# Patient Record
Sex: FEMALE | Race: WHITE | NOT HISPANIC OR LATINO | Employment: OTHER | URBAN - METROPOLITAN AREA
[De-identification: names, ages, dates, MRNs, and addresses within clinical notes are randomized per-mention and may not be internally consistent; named-entity substitution may affect disease eponyms.]

---

## 2017-01-04 ENCOUNTER — AMBULATORY - HEALTHEAST (OUTPATIENT)
Dept: NURSING | Facility: CLINIC | Age: 67
End: 2017-01-04

## 2017-01-07 ENCOUNTER — OFFICE VISIT - HEALTHEAST (OUTPATIENT)
Dept: FAMILY MEDICINE | Facility: CLINIC | Age: 67
End: 2017-01-07

## 2017-01-07 DIAGNOSIS — J02.9 PHARYNGITIS: ICD-10-CM

## 2017-01-07 DIAGNOSIS — R07.0 THROAT PAIN: ICD-10-CM

## 2017-01-09 ENCOUNTER — COMMUNICATION - HEALTHEAST (OUTPATIENT)
Dept: FAMILY MEDICINE | Facility: CLINIC | Age: 67
End: 2017-01-09

## 2017-01-11 ENCOUNTER — COMMUNICATION - HEALTHEAST (OUTPATIENT)
Dept: INTERNAL MEDICINE | Facility: CLINIC | Age: 67
End: 2017-01-11

## 2017-01-11 ENCOUNTER — OFFICE VISIT - HEALTHEAST (OUTPATIENT)
Dept: INTERNAL MEDICINE | Facility: CLINIC | Age: 67
End: 2017-01-11

## 2017-01-11 DIAGNOSIS — R05.9 COUGH: ICD-10-CM

## 2017-02-14 ENCOUNTER — AMBULATORY - HEALTHEAST (OUTPATIENT)
Dept: NURSING | Facility: CLINIC | Age: 67
End: 2017-02-14

## 2017-02-14 ENCOUNTER — COMMUNICATION - HEALTHEAST (OUTPATIENT)
Dept: INTERNAL MEDICINE | Facility: CLINIC | Age: 67
End: 2017-02-14

## 2017-02-16 ENCOUNTER — HOSPITAL ENCOUNTER (OUTPATIENT)
Dept: MAMMOGRAPHY | Facility: HOSPITAL | Age: 67
Discharge: HOME | End: 2017-02-16
Attending: INTERNAL MEDICINE

## 2017-02-16 DIAGNOSIS — Z12.31 VISIT FOR SCREENING MAMMOGRAM: ICD-10-CM

## 2017-02-17 ENCOUNTER — RECORDS - HEALTHEAST (OUTPATIENT)
Dept: ADMINISTRATIVE | Facility: OTHER | Age: 67
End: 2017-02-17

## 2017-03-02 ENCOUNTER — RECORDS - HEALTHEAST (OUTPATIENT)
Dept: ADMINISTRATIVE | Facility: OTHER | Age: 67
End: 2017-03-02

## 2017-03-03 ENCOUNTER — HOSPITAL ENCOUNTER (OUTPATIENT)
Dept: PHYSICAL MEDICINE AND REHAB | Facility: CLINIC | Age: 67
Discharge: HOME | End: 2017-03-03
Attending: PHYSICAL MEDICINE & REHABILITATION

## 2017-03-03 DIAGNOSIS — M43.8X9 SAGITTAL PLANE IMBALANCE: ICD-10-CM

## 2017-03-03 DIAGNOSIS — M54.50 LUMBAR SPINE PAIN: ICD-10-CM

## 2017-03-03 DIAGNOSIS — M54.6 THORACIC SPINE PAIN: ICD-10-CM

## 2017-03-03 ASSESSMENT — MIFFLIN-ST. JEOR: SCORE: 1191.99

## 2017-03-07 ENCOUNTER — OFFICE VISIT - HEALTHEAST (OUTPATIENT)
Dept: PHYSICAL THERAPY | Facility: REHABILITATION | Age: 67
End: 2017-03-07

## 2017-03-07 DIAGNOSIS — M62.81 MUSCLE WEAKNESS (GENERALIZED): ICD-10-CM

## 2017-03-07 DIAGNOSIS — R29.3 ABNORMAL POSTURE: ICD-10-CM

## 2017-03-07 DIAGNOSIS — M54.6 CHRONIC BILATERAL THORACIC BACK PAIN: ICD-10-CM

## 2017-03-07 DIAGNOSIS — G89.29 CHRONIC BILATERAL THORACIC BACK PAIN: ICD-10-CM

## 2017-03-08 ENCOUNTER — HOSPITAL ENCOUNTER (OUTPATIENT)
Dept: RADIOLOGY | Facility: HOSPITAL | Age: 67
Discharge: HOME | End: 2017-03-08
Attending: PHYSICAL MEDICINE & REHABILITATION

## 2017-03-08 DIAGNOSIS — M43.8X9 SAGITTAL PLANE IMBALANCE: ICD-10-CM

## 2017-03-09 ENCOUNTER — COMMUNICATION - HEALTHEAST (OUTPATIENT)
Dept: PHYSICAL MEDICINE AND REHAB | Facility: CLINIC | Age: 67
End: 2017-03-09

## 2017-03-13 ENCOUNTER — OFFICE VISIT - HEALTHEAST (OUTPATIENT)
Dept: PHYSICAL THERAPY | Facility: REHABILITATION | Age: 67
End: 2017-03-13

## 2017-03-13 ENCOUNTER — AMBULATORY - HEALTHEAST (OUTPATIENT)
Dept: NURSING | Facility: CLINIC | Age: 67
End: 2017-03-13

## 2017-03-13 DIAGNOSIS — G89.29 CHRONIC BILATERAL THORACIC BACK PAIN: ICD-10-CM

## 2017-03-13 DIAGNOSIS — M62.81 MUSCLE WEAKNESS (GENERALIZED): ICD-10-CM

## 2017-03-13 DIAGNOSIS — R29.3 ABNORMAL POSTURE: ICD-10-CM

## 2017-03-13 DIAGNOSIS — M54.6 CHRONIC BILATERAL THORACIC BACK PAIN: ICD-10-CM

## 2017-03-17 ENCOUNTER — OFFICE VISIT - HEALTHEAST (OUTPATIENT)
Dept: PHYSICAL THERAPY | Facility: REHABILITATION | Age: 67
End: 2017-03-17

## 2017-03-17 ENCOUNTER — OFFICE VISIT - HEALTHEAST (OUTPATIENT)
Dept: INTERNAL MEDICINE | Facility: CLINIC | Age: 67
End: 2017-03-17

## 2017-03-17 DIAGNOSIS — M62.81 MUSCLE WEAKNESS (GENERALIZED): ICD-10-CM

## 2017-03-17 DIAGNOSIS — M81.0 OSTEOPOROSIS: ICD-10-CM

## 2017-03-17 DIAGNOSIS — R29.3 ABNORMAL POSTURE: ICD-10-CM

## 2017-03-17 DIAGNOSIS — G89.29 CHRONIC BILATERAL THORACIC BACK PAIN: ICD-10-CM

## 2017-03-17 DIAGNOSIS — M54.6 CHRONIC BILATERAL THORACIC BACK PAIN: ICD-10-CM

## 2017-03-21 ENCOUNTER — OFFICE VISIT - HEALTHEAST (OUTPATIENT)
Dept: PHYSICAL THERAPY | Facility: REHABILITATION | Age: 67
End: 2017-03-21

## 2017-03-21 DIAGNOSIS — G89.29 CHRONIC BILATERAL THORACIC BACK PAIN: ICD-10-CM

## 2017-03-21 DIAGNOSIS — R29.3 ABNORMAL POSTURE: ICD-10-CM

## 2017-03-21 DIAGNOSIS — M62.81 MUSCLE WEAKNESS (GENERALIZED): ICD-10-CM

## 2017-03-21 DIAGNOSIS — M54.6 CHRONIC BILATERAL THORACIC BACK PAIN: ICD-10-CM

## 2017-03-24 ENCOUNTER — OFFICE VISIT - HEALTHEAST (OUTPATIENT)
Dept: PHYSICAL THERAPY | Facility: REHABILITATION | Age: 67
End: 2017-03-24

## 2017-03-24 DIAGNOSIS — R29.3 ABNORMAL POSTURE: ICD-10-CM

## 2017-03-24 DIAGNOSIS — G89.29 CHRONIC BILATERAL THORACIC BACK PAIN: ICD-10-CM

## 2017-03-24 DIAGNOSIS — M62.81 MUSCLE WEAKNESS (GENERALIZED): ICD-10-CM

## 2017-03-24 DIAGNOSIS — M54.6 CHRONIC BILATERAL THORACIC BACK PAIN: ICD-10-CM

## 2017-03-28 ENCOUNTER — OFFICE VISIT - HEALTHEAST (OUTPATIENT)
Dept: PHYSICAL THERAPY | Facility: REHABILITATION | Age: 67
End: 2017-03-28

## 2017-03-28 DIAGNOSIS — R29.3 ABNORMAL POSTURE: ICD-10-CM

## 2017-03-28 DIAGNOSIS — G89.29 CHRONIC BILATERAL THORACIC BACK PAIN: ICD-10-CM

## 2017-03-28 DIAGNOSIS — M54.6 CHRONIC BILATERAL THORACIC BACK PAIN: ICD-10-CM

## 2017-03-28 DIAGNOSIS — M62.81 MUSCLE WEAKNESS (GENERALIZED): ICD-10-CM

## 2017-04-03 ENCOUNTER — OFFICE VISIT - HEALTHEAST (OUTPATIENT)
Dept: INTERNAL MEDICINE | Facility: CLINIC | Age: 67
End: 2017-04-03

## 2017-04-03 ENCOUNTER — OFFICE VISIT - HEALTHEAST (OUTPATIENT)
Dept: PHYSICAL THERAPY | Facility: REHABILITATION | Age: 67
End: 2017-04-03

## 2017-04-03 DIAGNOSIS — Z00.00 HEALTH CARE MAINTENANCE: ICD-10-CM

## 2017-04-03 DIAGNOSIS — G89.29 CHRONIC BILATERAL THORACIC BACK PAIN: ICD-10-CM

## 2017-04-03 DIAGNOSIS — R29.3 ABNORMAL POSTURE: ICD-10-CM

## 2017-04-03 DIAGNOSIS — Z78.0 MENOPAUSE: ICD-10-CM

## 2017-04-03 DIAGNOSIS — M62.81 MUSCLE WEAKNESS (GENERALIZED): ICD-10-CM

## 2017-04-03 DIAGNOSIS — E78.5 HYPERLIPEMIA: ICD-10-CM

## 2017-04-03 DIAGNOSIS — E53.8 B12 DEFICIENCY: ICD-10-CM

## 2017-04-03 DIAGNOSIS — M54.6 CHRONIC BILATERAL THORACIC BACK PAIN: ICD-10-CM

## 2017-04-03 LAB
CHOLEST SERPL-MCNC: 175 MG/DL
FASTING STATUS PATIENT QL REPORTED: YES
HDLC SERPL-MCNC: 53 MG/DL
LDLC SERPL CALC-MCNC: 87 MG/DL
TRIGL SERPL-MCNC: 173 MG/DL

## 2017-04-03 ASSESSMENT — MIFFLIN-ST. JEOR: SCORE: 1187.23

## 2017-04-04 ENCOUNTER — COMMUNICATION - HEALTHEAST (OUTPATIENT)
Dept: INTERNAL MEDICINE | Facility: CLINIC | Age: 67
End: 2017-04-04

## 2017-04-06 ENCOUNTER — HOSPITAL ENCOUNTER (OUTPATIENT)
Dept: PHYSICAL MEDICINE AND REHAB | Facility: CLINIC | Age: 67
Discharge: HOME | End: 2017-04-06
Attending: PHYSICAL MEDICINE & REHABILITATION

## 2017-04-06 DIAGNOSIS — M54.50 LUMBAR SPINE PAIN: ICD-10-CM

## 2017-04-06 DIAGNOSIS — M54.6 THORACIC SPINE PAIN: ICD-10-CM

## 2017-04-06 DIAGNOSIS — M43.8X9 SAGITTAL PLANE IMBALANCE: ICD-10-CM

## 2017-04-06 ASSESSMENT — MIFFLIN-ST. JEOR: SCORE: 1184.06

## 2017-04-18 ENCOUNTER — RECORDS - HEALTHEAST (OUTPATIENT)
Dept: BONE DENSITY | Facility: CLINIC | Age: 67
End: 2017-04-18

## 2017-04-18 ENCOUNTER — RECORDS - HEALTHEAST (OUTPATIENT)
Dept: ADMINISTRATIVE | Facility: OTHER | Age: 67
End: 2017-04-18

## 2017-04-18 DIAGNOSIS — M81.0 AGE-RELATED OSTEOPOROSIS WITHOUT CURRENT PATHOLOGICAL FRACTURE: ICD-10-CM

## 2017-05-03 ENCOUNTER — COMMUNICATION - HEALTHEAST (OUTPATIENT)
Dept: INTERNAL MEDICINE | Facility: CLINIC | Age: 67
End: 2017-05-03

## 2017-05-08 ENCOUNTER — OFFICE VISIT - HEALTHEAST (OUTPATIENT)
Dept: PHYSICAL THERAPY | Facility: REHABILITATION | Age: 67
End: 2017-05-08

## 2017-05-08 ENCOUNTER — AMBULATORY - HEALTHEAST (OUTPATIENT)
Dept: NURSING | Facility: CLINIC | Age: 67
End: 2017-05-08

## 2017-05-08 DIAGNOSIS — M62.81 MUSCLE WEAKNESS (GENERALIZED): ICD-10-CM

## 2017-05-08 DIAGNOSIS — R29.3 ABNORMAL POSTURE: ICD-10-CM

## 2017-05-08 DIAGNOSIS — M54.6 CHRONIC BILATERAL THORACIC BACK PAIN: ICD-10-CM

## 2017-05-08 DIAGNOSIS — G89.29 CHRONIC BILATERAL THORACIC BACK PAIN: ICD-10-CM

## 2017-06-07 ENCOUNTER — HOSPITAL ENCOUNTER (OUTPATIENT)
Dept: PHYSICAL MEDICINE AND REHAB | Facility: CLINIC | Age: 67
Discharge: HOME | End: 2017-06-07
Attending: PHYSICAL MEDICINE & REHABILITATION

## 2017-06-07 DIAGNOSIS — M79.18 MYOFASCIAL MUSCLE PAIN: ICD-10-CM

## 2017-06-07 DIAGNOSIS — M99.02 SOMATIC DYSFUNCTION OF THORACIC REGION: ICD-10-CM

## 2017-06-07 DIAGNOSIS — M54.2 CERVICALGIA: ICD-10-CM

## 2017-06-07 DIAGNOSIS — M99.01 SOMATIC DYSFUNCTION OF CERVICAL REGION: ICD-10-CM

## 2017-06-07 DIAGNOSIS — M99.08 SOMATIC DYSFUNCTION OF RIB REGION: ICD-10-CM

## 2017-06-07 DIAGNOSIS — M99.07 SOMATIC DYSFUNCTION OF UPPER EXTREMITY: ICD-10-CM

## 2017-06-07 DIAGNOSIS — M99.00 SOMATIC DYSFUNCTION OF HEAD REGION: ICD-10-CM

## 2017-06-07 ASSESSMENT — MIFFLIN-ST. JEOR: SCORE: 1184.06

## 2017-06-08 ENCOUNTER — AMBULATORY - HEALTHEAST (OUTPATIENT)
Dept: NURSING | Facility: CLINIC | Age: 67
End: 2017-06-08

## 2017-07-12 ENCOUNTER — RECORDS - HEALTHEAST (OUTPATIENT)
Dept: ADMINISTRATIVE | Facility: OTHER | Age: 67
End: 2017-07-12

## 2017-07-17 ENCOUNTER — OFFICE VISIT - HEALTHEAST (OUTPATIENT)
Dept: INTERNAL MEDICINE | Facility: CLINIC | Age: 67
End: 2017-07-17

## 2017-07-17 DIAGNOSIS — Z01.818 PREOP EXAM FOR INTERNAL MEDICINE: ICD-10-CM

## 2017-07-17 DIAGNOSIS — I45.10 RBBB: ICD-10-CM

## 2017-07-17 DIAGNOSIS — M40.00 KYPHOSIS (ACQUIRED) (POSTURAL): ICD-10-CM

## 2017-07-17 DIAGNOSIS — M18.12 PRIMARY OSTEOARTHRITIS OF FIRST CARPOMETACARPAL JOINT OF LEFT HAND: ICD-10-CM

## 2017-07-17 RX ORDER — CHLORAL HYDRATE 500 MG
2 CAPSULE ORAL 2 TIMES DAILY
Status: SHIPPED | COMMUNITY
Start: 2017-07-17 | End: 2021-12-28

## 2017-07-17 RX ORDER — MULTIPLE VITAMINS W/ MINERALS TAB 9MG-400MCG
1 TAB ORAL DAILY
Status: SHIPPED | COMMUNITY
Start: 2017-07-17

## 2017-08-01 ENCOUNTER — RECORDS - HEALTHEAST (OUTPATIENT)
Dept: ADMINISTRATIVE | Facility: OTHER | Age: 67
End: 2017-08-01

## 2017-08-10 ENCOUNTER — RECORDS - HEALTHEAST (OUTPATIENT)
Dept: ADMINISTRATIVE | Facility: OTHER | Age: 67
End: 2017-08-10

## 2017-08-18 ENCOUNTER — AMBULATORY - HEALTHEAST (OUTPATIENT)
Dept: NURSING | Facility: CLINIC | Age: 67
End: 2017-08-18

## 2017-08-30 ENCOUNTER — RECORDS - HEALTHEAST (OUTPATIENT)
Dept: ADMINISTRATIVE | Facility: OTHER | Age: 67
End: 2017-08-30

## 2017-09-15 ENCOUNTER — RECORDS - HEALTHEAST (OUTPATIENT)
Dept: ADMINISTRATIVE | Facility: OTHER | Age: 67
End: 2017-09-15

## 2017-09-22 ENCOUNTER — AMBULATORY - HEALTHEAST (OUTPATIENT)
Dept: NURSING | Facility: CLINIC | Age: 67
End: 2017-09-22

## 2017-09-22 DIAGNOSIS — Z00.00 HEALTHCARE MAINTENANCE: ICD-10-CM

## 2017-09-25 ENCOUNTER — RECORDS - HEALTHEAST (OUTPATIENT)
Dept: ADMINISTRATIVE | Facility: OTHER | Age: 67
End: 2017-09-25

## 2017-09-26 ENCOUNTER — COMMUNICATION - HEALTHEAST (OUTPATIENT)
Dept: INTERNAL MEDICINE | Facility: CLINIC | Age: 67
End: 2017-09-26

## 2017-10-17 ENCOUNTER — COMMUNICATION - HEALTHEAST (OUTPATIENT)
Dept: INTERNAL MEDICINE | Facility: CLINIC | Age: 67
End: 2017-10-17

## 2017-10-17 DIAGNOSIS — Z00.00 ROUTINE HEALTH MAINTENANCE: ICD-10-CM

## 2017-10-18 ENCOUNTER — RECORDS - HEALTHEAST (OUTPATIENT)
Dept: ADMINISTRATIVE | Facility: OTHER | Age: 67
End: 2017-10-18

## 2017-10-23 ENCOUNTER — AMBULATORY - HEALTHEAST (OUTPATIENT)
Dept: NURSING | Facility: CLINIC | Age: 67
End: 2017-10-23

## 2017-10-23 DIAGNOSIS — Z00.00 ROUTINE HEALTH MAINTENANCE: ICD-10-CM

## 2017-11-14 ENCOUNTER — AMBULATORY - HEALTHEAST (OUTPATIENT)
Dept: NURSING | Facility: CLINIC | Age: 67
End: 2017-11-14

## 2017-12-19 ENCOUNTER — AMBULATORY - HEALTHEAST (OUTPATIENT)
Dept: NURSING | Facility: CLINIC | Age: 67
End: 2017-12-19

## 2018-04-11 ENCOUNTER — COMMUNICATION - HEALTHEAST (OUTPATIENT)
Dept: INTERNAL MEDICINE | Facility: CLINIC | Age: 68
End: 2018-04-11

## 2018-04-11 DIAGNOSIS — Z78.0 MENOPAUSE: ICD-10-CM

## 2018-04-19 ENCOUNTER — OFFICE VISIT - HEALTHEAST (OUTPATIENT)
Dept: INTERNAL MEDICINE | Facility: CLINIC | Age: 68
End: 2018-04-19

## 2018-04-19 DIAGNOSIS — E78.5 HYPERLIPEMIA: ICD-10-CM

## 2018-04-19 DIAGNOSIS — E53.8 VITAMIN B12 DEFICIENCY: ICD-10-CM

## 2018-04-19 DIAGNOSIS — E55.9 VITAMIN D DEFICIENCY: ICD-10-CM

## 2018-04-19 LAB
ALBUMIN SERPL-MCNC: 3.5 G/DL (ref 3.5–5)
ALP SERPL-CCNC: 60 U/L (ref 45–120)
ALT SERPL W P-5'-P-CCNC: 29 U/L (ref 0–45)
ANION GAP SERPL CALCULATED.3IONS-SCNC: 9 MMOL/L (ref 5–18)
AST SERPL W P-5'-P-CCNC: 23 U/L (ref 0–40)
BILIRUB SERPL-MCNC: 0.5 MG/DL (ref 0–1)
BUN SERPL-MCNC: 15 MG/DL (ref 8–22)
CALCIUM SERPL-MCNC: 9.5 MG/DL (ref 8.5–10.5)
CHLORIDE BLD-SCNC: 105 MMOL/L (ref 98–107)
CHOLEST SERPL-MCNC: 191 MG/DL
CO2 SERPL-SCNC: 27 MMOL/L (ref 22–31)
CREAT SERPL-MCNC: 0.75 MG/DL (ref 0.6–1.1)
ERYTHROCYTE [DISTWIDTH] IN BLOOD BY AUTOMATED COUNT: 12 % (ref 11–14.5)
FASTING STATUS PATIENT QL REPORTED: YES
GFR SERPL CREATININE-BSD FRML MDRD: >60 ML/MIN/1.73M2
GLUCOSE BLD-MCNC: 91 MG/DL (ref 70–125)
HCT VFR BLD AUTO: 44.4 % (ref 35–47)
HDLC SERPL-MCNC: 52 MG/DL
HGB BLD-MCNC: 14.8 G/DL (ref 12–16)
LDLC SERPL CALC-MCNC: 105 MG/DL
MCH RBC QN AUTO: 29.3 PG (ref 27–34)
MCHC RBC AUTO-ENTMCNC: 33.3 G/DL (ref 32–36)
MCV RBC AUTO: 88 FL (ref 80–100)
PLATELET # BLD AUTO: 286 THOU/UL (ref 140–440)
PMV BLD AUTO: 8.1 FL (ref 7–10)
POTASSIUM BLD-SCNC: 4.6 MMOL/L (ref 3.5–5)
PROT SERPL-MCNC: 6.6 G/DL (ref 6–8)
RBC # BLD AUTO: 5.03 MILL/UL (ref 3.8–5.4)
SODIUM SERPL-SCNC: 141 MMOL/L (ref 136–145)
TRIGL SERPL-MCNC: 168 MG/DL
TSH SERPL DL<=0.005 MIU/L-ACNC: 1.38 UIU/ML (ref 0.3–5)
VIT B12 SERPL-MCNC: >2000 PG/ML (ref 213–816)
WBC: 6.7 THOU/UL (ref 4–11)

## 2018-04-19 ASSESSMENT — MIFFLIN-ST. JEOR: SCORE: 1180.6

## 2018-04-20 ENCOUNTER — COMMUNICATION - HEALTHEAST (OUTPATIENT)
Dept: INTERNAL MEDICINE | Facility: CLINIC | Age: 68
End: 2018-04-20

## 2018-04-20 LAB — 25(OH)D3 SERPL-MCNC: 46.7 NG/ML (ref 30–80)

## 2018-05-08 ENCOUNTER — HOSPITAL ENCOUNTER (OUTPATIENT)
Dept: MAMMOGRAPHY | Facility: CLINIC | Age: 68
Discharge: HOME | End: 2018-05-08
Attending: INTERNAL MEDICINE

## 2018-05-08 DIAGNOSIS — Z12.31 VISIT FOR SCREENING MAMMOGRAM: ICD-10-CM

## 2018-05-10 ENCOUNTER — COMMUNICATION - HEALTHEAST (OUTPATIENT)
Dept: INTERNAL MEDICINE | Facility: CLINIC | Age: 68
End: 2018-05-10

## 2018-05-10 DIAGNOSIS — Z78.0 MENOPAUSE: ICD-10-CM

## 2018-05-30 ENCOUNTER — RECORDS - HEALTHEAST (OUTPATIENT)
Dept: ADMINISTRATIVE | Facility: OTHER | Age: 68
End: 2018-05-30

## 2018-06-01 ENCOUNTER — RECORDS - HEALTHEAST (OUTPATIENT)
Dept: ADMINISTRATIVE | Facility: OTHER | Age: 68
End: 2018-06-01

## 2018-11-20 ENCOUNTER — COMMUNICATION - HEALTHEAST (OUTPATIENT)
Dept: INTERNAL MEDICINE | Facility: CLINIC | Age: 68
End: 2018-11-20

## 2018-11-20 DIAGNOSIS — Z78.0 MENOPAUSE: ICD-10-CM

## 2019-02-08 ENCOUNTER — RECORDS - HEALTHEAST (OUTPATIENT)
Dept: ADMINISTRATIVE | Facility: OTHER | Age: 69
End: 2019-02-08

## 2019-03-19 ENCOUNTER — COMMUNICATION - HEALTHEAST (OUTPATIENT)
Dept: INTERNAL MEDICINE | Facility: CLINIC | Age: 69
End: 2019-03-19

## 2019-03-19 DIAGNOSIS — Z78.0 MENOPAUSE: ICD-10-CM

## 2019-05-02 ENCOUNTER — OFFICE VISIT - HEALTHEAST (OUTPATIENT)
Dept: INTERNAL MEDICINE | Facility: CLINIC | Age: 69
End: 2019-05-02

## 2019-05-02 DIAGNOSIS — Z00.00 ENCOUNTER FOR MEDICARE ANNUAL WELLNESS EXAM: ICD-10-CM

## 2019-05-02 DIAGNOSIS — Z78.0 MENOPAUSE: ICD-10-CM

## 2019-05-02 DIAGNOSIS — M85.89 OSTEOPENIA OF MULTIPLE SITES: ICD-10-CM

## 2019-05-02 DIAGNOSIS — H90.3 SENSORINEURAL HEARING LOSS, BILATERAL: ICD-10-CM

## 2019-05-02 LAB
ALBUMIN SERPL-MCNC: 3.6 G/DL (ref 3.5–5)
ALP SERPL-CCNC: 60 U/L (ref 45–120)
ALT SERPL W P-5'-P-CCNC: 29 U/L (ref 0–45)
ANION GAP SERPL CALCULATED.3IONS-SCNC: 13 MMOL/L (ref 5–18)
AST SERPL W P-5'-P-CCNC: 23 U/L (ref 0–40)
BASOPHILS # BLD AUTO: 0.1 THOU/UL (ref 0–0.2)
BASOPHILS NFR BLD AUTO: 1 % (ref 0–2)
BILIRUB SERPL-MCNC: 0.5 MG/DL (ref 0–1)
BUN SERPL-MCNC: 15 MG/DL (ref 8–22)
CALCIUM SERPL-MCNC: 10 MG/DL (ref 8.5–10.5)
CHLORIDE BLD-SCNC: 106 MMOL/L (ref 98–107)
CO2 SERPL-SCNC: 23 MMOL/L (ref 22–31)
CREAT SERPL-MCNC: 0.77 MG/DL (ref 0.6–1.1)
EOSINOPHIL # BLD AUTO: 0.3 THOU/UL (ref 0–0.4)
EOSINOPHIL NFR BLD AUTO: 3 % (ref 0–6)
ERYTHROCYTE [DISTWIDTH] IN BLOOD BY AUTOMATED COUNT: 11.8 % (ref 11–14.5)
GFR SERPL CREATININE-BSD FRML MDRD: >60 ML/MIN/1.73M2
GLUCOSE BLD-MCNC: 82 MG/DL (ref 70–125)
HCT VFR BLD AUTO: 40.5 % (ref 35–47)
HGB BLD-MCNC: 13.5 G/DL (ref 12–16)
LYMPHOCYTES # BLD AUTO: 1.1 THOU/UL (ref 0.8–4.4)
LYMPHOCYTES NFR BLD AUTO: 9 % (ref 20–40)
MCH RBC QN AUTO: 29.7 PG (ref 27–34)
MCHC RBC AUTO-ENTMCNC: 33.3 G/DL (ref 32–36)
MCV RBC AUTO: 89 FL (ref 80–100)
MONOCYTES # BLD AUTO: 1 THOU/UL (ref 0–0.9)
MONOCYTES NFR BLD AUTO: 8 % (ref 2–10)
NEUTROPHILS # BLD AUTO: 9.9 THOU/UL (ref 2–7.7)
NEUTROPHILS NFR BLD AUTO: 80 % (ref 50–70)
PLATELET # BLD AUTO: 264 THOU/UL (ref 140–440)
PMV BLD AUTO: 8.3 FL (ref 7–10)
POTASSIUM BLD-SCNC: 4.3 MMOL/L (ref 3.5–5)
PROT SERPL-MCNC: 6.5 G/DL (ref 6–8)
RBC # BLD AUTO: 4.54 MILL/UL (ref 3.8–5.4)
SODIUM SERPL-SCNC: 142 MMOL/L (ref 136–145)
WBC: 12.3 THOU/UL (ref 4–11)

## 2019-05-02 ASSESSMENT — MIFFLIN-ST. JEOR: SCORE: 1206.5

## 2019-05-03 ENCOUNTER — COMMUNICATION - HEALTHEAST (OUTPATIENT)
Dept: INTERNAL MEDICINE | Facility: CLINIC | Age: 69
End: 2019-05-03

## 2019-05-06 ENCOUNTER — COMMUNICATION - HEALTHEAST (OUTPATIENT)
Dept: INTERNAL MEDICINE | Facility: CLINIC | Age: 69
End: 2019-05-06

## 2019-05-07 ENCOUNTER — COMMUNICATION - HEALTHEAST (OUTPATIENT)
Dept: INTERNAL MEDICINE | Facility: CLINIC | Age: 69
End: 2019-05-07

## 2019-05-10 ENCOUNTER — COMMUNICATION - HEALTHEAST (OUTPATIENT)
Dept: INTERNAL MEDICINE | Facility: CLINIC | Age: 69
End: 2019-05-10

## 2019-05-13 ENCOUNTER — RECORDS - HEALTHEAST (OUTPATIENT)
Dept: BONE DENSITY | Facility: CLINIC | Age: 69
End: 2019-05-13

## 2019-05-13 ENCOUNTER — RECORDS - HEALTHEAST (OUTPATIENT)
Dept: ADMINISTRATIVE | Facility: OTHER | Age: 69
End: 2019-05-13

## 2019-05-13 DIAGNOSIS — Z78.0 ASYMPTOMATIC MENOPAUSAL STATE: ICD-10-CM

## 2019-05-14 ENCOUNTER — RECORDS - HEALTHEAST (OUTPATIENT)
Dept: MAMMOGRAPHY | Facility: CLINIC | Age: 69
End: 2019-05-14

## 2019-05-14 DIAGNOSIS — Z12.31 ENCOUNTER FOR SCREENING MAMMOGRAM FOR MALIGNANT NEOPLASM OF BREAST: ICD-10-CM

## 2019-05-17 ENCOUNTER — COMMUNICATION - HEALTHEAST (OUTPATIENT)
Dept: INTERNAL MEDICINE | Facility: CLINIC | Age: 69
End: 2019-05-17

## 2019-05-19 ENCOUNTER — COMMUNICATION - HEALTHEAST (OUTPATIENT)
Dept: INTERNAL MEDICINE | Facility: CLINIC | Age: 69
End: 2019-05-19

## 2019-05-20 ENCOUNTER — COMMUNICATION - HEALTHEAST (OUTPATIENT)
Dept: INTERNAL MEDICINE | Facility: CLINIC | Age: 69
End: 2019-05-20

## 2019-06-27 ENCOUNTER — COMMUNICATION - HEALTHEAST (OUTPATIENT)
Dept: INTERNAL MEDICINE | Facility: CLINIC | Age: 69
End: 2019-06-27

## 2019-06-27 DIAGNOSIS — E78.5 HYPERLIPIDEMIA LDL GOAL <100: ICD-10-CM

## 2019-07-03 ENCOUNTER — COMMUNICATION - HEALTHEAST (OUTPATIENT)
Dept: INTERNAL MEDICINE | Facility: CLINIC | Age: 69
End: 2019-07-03

## 2019-07-03 ENCOUNTER — AMBULATORY - HEALTHEAST (OUTPATIENT)
Dept: LAB | Facility: CLINIC | Age: 69
End: 2019-07-03

## 2019-07-03 DIAGNOSIS — E78.5 HYPERLIPIDEMIA LDL GOAL <100: ICD-10-CM

## 2019-07-03 LAB
BASOPHILS # BLD AUTO: 0.1 THOU/UL (ref 0–0.2)
BASOPHILS NFR BLD AUTO: 1 % (ref 0–2)
CHOLEST SERPL-MCNC: 160 MG/DL
EOSINOPHIL # BLD AUTO: 0.3 THOU/UL (ref 0–0.4)
EOSINOPHIL NFR BLD AUTO: 3 % (ref 0–6)
ERYTHROCYTE [DISTWIDTH] IN BLOOD BY AUTOMATED COUNT: 11.5 % (ref 11–14.5)
FASTING STATUS PATIENT QL REPORTED: YES
HCT VFR BLD AUTO: 42.4 % (ref 35–47)
HDLC SERPL-MCNC: 55 MG/DL
HGB BLD-MCNC: 14.2 G/DL (ref 12–16)
LDLC SERPL CALC-MCNC: 78 MG/DL
LYMPHOCYTES # BLD AUTO: 1.9 THOU/UL (ref 0.8–4.4)
LYMPHOCYTES NFR BLD AUTO: 22 % (ref 20–40)
MCH RBC QN AUTO: 29.8 PG (ref 27–34)
MCHC RBC AUTO-ENTMCNC: 33.5 G/DL (ref 32–36)
MCV RBC AUTO: 89 FL (ref 80–100)
MONOCYTES # BLD AUTO: 0.7 THOU/UL (ref 0–0.9)
MONOCYTES NFR BLD AUTO: 8 % (ref 2–10)
NEUTROPHILS # BLD AUTO: 5.6 THOU/UL (ref 2–7.7)
NEUTROPHILS NFR BLD AUTO: 66 % (ref 50–70)
PLATELET # BLD AUTO: 355 THOU/UL (ref 140–440)
PMV BLD AUTO: 8.1 FL (ref 7–10)
RBC # BLD AUTO: 4.77 MILL/UL (ref 3.8–5.4)
TRIGL SERPL-MCNC: 137 MG/DL
WBC: 8.5 THOU/UL (ref 4–11)

## 2019-08-02 ENCOUNTER — RECORDS - HEALTHEAST (OUTPATIENT)
Dept: ADMINISTRATIVE | Facility: OTHER | Age: 69
End: 2019-08-02

## 2019-08-02 LAB
LAB AP CHARGES (HE HISTORICAL CONVERSION): NORMAL
PATH REPORT.COMMENTS IMP SPEC: NORMAL
PATH REPORT.COMMENTS IMP SPEC: NORMAL
PATH REPORT.FINAL DX SPEC: NORMAL
PATH REPORT.GROSS SPEC: NORMAL
PATH REPORT.MICROSCOPIC SPEC OTHER STN: NORMAL
PATH REPORT.RELEVANT HX SPEC: NORMAL
RESULT FLAG (HE HISTORICAL CONVERSION): NORMAL

## 2020-01-31 ENCOUNTER — COMMUNICATION - HEALTHEAST (OUTPATIENT)
Dept: INTERNAL MEDICINE | Facility: CLINIC | Age: 70
End: 2020-01-31

## 2020-01-31 DIAGNOSIS — Z78.0 MENOPAUSE: ICD-10-CM

## 2020-05-23 ENCOUNTER — COMMUNICATION - HEALTHEAST (OUTPATIENT)
Dept: INTERNAL MEDICINE | Facility: CLINIC | Age: 70
End: 2020-05-23

## 2020-05-23 DIAGNOSIS — Z12.31 ENCOUNTER FOR SCREENING MAMMOGRAM FOR BREAST CANCER: ICD-10-CM

## 2020-05-25 ENCOUNTER — COMMUNICATION - HEALTHEAST (OUTPATIENT)
Dept: INTERNAL MEDICINE | Facility: CLINIC | Age: 70
End: 2020-05-25

## 2020-05-25 DIAGNOSIS — Z78.0 MENOPAUSE: ICD-10-CM

## 2020-05-26 ENCOUNTER — COMMUNICATION - HEALTHEAST (OUTPATIENT)
Dept: INTERNAL MEDICINE | Facility: CLINIC | Age: 70
End: 2020-05-26

## 2020-05-26 DIAGNOSIS — Z78.0 MENOPAUSE: ICD-10-CM

## 2020-05-27 RX ORDER — ESTRADIOL 0.5 MG/1
TABLET ORAL
Qty: 90 TABLET | Refills: 3 | Status: SHIPPED | OUTPATIENT
Start: 2020-05-27 | End: 2021-12-22

## 2020-06-23 ENCOUNTER — HOSPITAL ENCOUNTER (OUTPATIENT)
Dept: MAMMOGRAPHY | Facility: CLINIC | Age: 70
Discharge: HOME | End: 2020-06-23
Attending: INTERNAL MEDICINE

## 2020-06-23 DIAGNOSIS — Z12.31 ENCOUNTER FOR SCREENING MAMMOGRAM FOR BREAST CANCER: ICD-10-CM

## 2020-07-27 ENCOUNTER — OFFICE VISIT - HEALTHEAST (OUTPATIENT)
Dept: INTERNAL MEDICINE | Facility: CLINIC | Age: 70
End: 2020-07-27

## 2020-07-27 DIAGNOSIS — G60.9 HEREDITARY AND IDIOPATHIC PERIPHERAL NEUROPATHY: ICD-10-CM

## 2020-07-27 DIAGNOSIS — Z79.890 HORMONE REPLACEMENT THERAPY: ICD-10-CM

## 2020-07-27 DIAGNOSIS — K63.5 BENIGN POLYP OF LARGE INTESTINE: ICD-10-CM

## 2020-07-27 DIAGNOSIS — E53.8 VITAMIN B12 DEFICIENCY (NON ANEMIC): ICD-10-CM

## 2020-07-27 DIAGNOSIS — Z23 ENCOUNTER FOR IMMUNIZATION: ICD-10-CM

## 2020-07-27 DIAGNOSIS — Z13.220 SCREENING FOR HYPERLIPIDEMIA: ICD-10-CM

## 2020-07-27 DIAGNOSIS — R63.4 WEIGHT LOSS: ICD-10-CM

## 2020-07-27 DIAGNOSIS — R15.9 INCONTINENCE OF FECES, UNSPECIFIED FECAL INCONTINENCE TYPE: ICD-10-CM

## 2020-07-27 DIAGNOSIS — Z00.00 INITIAL MEDICARE ANNUAL WELLNESS VISIT: ICD-10-CM

## 2020-07-27 DIAGNOSIS — E73.9 LACTOSE INTOLERANCE: ICD-10-CM

## 2020-07-27 LAB
ALBUMIN SERPL-MCNC: 3.8 G/DL (ref 3.5–5)
ALP SERPL-CCNC: 62 U/L (ref 45–120)
ALT SERPL W P-5'-P-CCNC: 36 U/L (ref 0–45)
ANION GAP SERPL CALCULATED.3IONS-SCNC: 8 MMOL/L (ref 5–18)
AST SERPL W P-5'-P-CCNC: 30 U/L (ref 0–40)
BASOPHILS # BLD AUTO: 0.1 THOU/UL (ref 0–0.2)
BASOPHILS NFR BLD AUTO: 1 % (ref 0–2)
BILIRUB SERPL-MCNC: 0.4 MG/DL (ref 0–1)
BUN SERPL-MCNC: 14 MG/DL (ref 8–22)
CALCIUM SERPL-MCNC: 9.9 MG/DL (ref 8.5–10.5)
CHLORIDE BLD-SCNC: 104 MMOL/L (ref 98–107)
CHOLEST SERPL-MCNC: 184 MG/DL
CO2 SERPL-SCNC: 29 MMOL/L (ref 22–31)
CREAT SERPL-MCNC: 0.84 MG/DL (ref 0.6–1.1)
EOSINOPHIL # BLD AUTO: 0.2 THOU/UL (ref 0–0.4)
EOSINOPHIL NFR BLD AUTO: 2 % (ref 0–6)
ERYTHROCYTE [DISTWIDTH] IN BLOOD BY AUTOMATED COUNT: 12.1 % (ref 11–14.5)
FASTING STATUS PATIENT QL REPORTED: YES
GFR SERPL CREATININE-BSD FRML MDRD: >60 ML/MIN/1.73M2
GLUCOSE BLD-MCNC: 98 MG/DL (ref 70–125)
HCT VFR BLD AUTO: 44 % (ref 35–47)
HDLC SERPL-MCNC: 51 MG/DL
HGB BLD-MCNC: 14.6 G/DL (ref 12–16)
LDLC SERPL CALC-MCNC: 106 MG/DL
LYMPHOCYTES # BLD AUTO: 1.4 THOU/UL (ref 0.8–4.4)
LYMPHOCYTES NFR BLD AUTO: 17 % (ref 20–40)
MCH RBC QN AUTO: 30 PG (ref 27–34)
MCHC RBC AUTO-ENTMCNC: 33.2 G/DL (ref 32–36)
MCV RBC AUTO: 90 FL (ref 80–100)
MONOCYTES # BLD AUTO: 0.6 THOU/UL (ref 0–0.9)
MONOCYTES NFR BLD AUTO: 8 % (ref 2–10)
NEUTROPHILS # BLD AUTO: 5.7 THOU/UL (ref 2–7.7)
NEUTROPHILS NFR BLD AUTO: 72 % (ref 50–70)
PLATELET # BLD AUTO: 305 THOU/UL (ref 140–440)
PMV BLD AUTO: 8.1 FL (ref 7–10)
POTASSIUM BLD-SCNC: 4.8 MMOL/L (ref 3.5–5)
PROT SERPL-MCNC: 6.9 G/DL (ref 6–8)
RBC # BLD AUTO: 4.87 MILL/UL (ref 3.8–5.4)
SODIUM SERPL-SCNC: 141 MMOL/L (ref 136–145)
T4 FREE SERPL-MCNC: 0.9 NG/DL (ref 0.7–1.8)
TRIGL SERPL-MCNC: 135 MG/DL
TSH SERPL DL<=0.005 MIU/L-ACNC: 1.99 UIU/ML (ref 0.3–5)
VIT B12 SERPL-MCNC: 932 PG/ML (ref 213–816)
WBC: 7.9 THOU/UL (ref 4–11)

## 2020-07-27 ASSESSMENT — MIFFLIN-ST. JEOR: SCORE: 1134.16

## 2020-07-28 ENCOUNTER — COMMUNICATION - HEALTHEAST (OUTPATIENT)
Dept: INTERNAL MEDICINE | Facility: CLINIC | Age: 70
End: 2020-07-28

## 2020-11-27 ENCOUNTER — COMMUNICATION - HEALTHEAST (OUTPATIENT)
Dept: INTERNAL MEDICINE | Facility: CLINIC | Age: 70
End: 2020-11-27

## 2021-02-16 ENCOUNTER — COMMUNICATION - HEALTHEAST (OUTPATIENT)
Dept: INTERNAL MEDICINE | Facility: CLINIC | Age: 71
End: 2021-02-16

## 2021-04-07 ENCOUNTER — OFFICE VISIT - HEALTHEAST (OUTPATIENT)
Dept: OTOLARYNGOLOGY | Facility: CLINIC | Age: 71
End: 2021-04-07

## 2021-04-07 DIAGNOSIS — J35.8 TONSIL STONE: ICD-10-CM

## 2021-04-07 DIAGNOSIS — J37.0 CHRONIC LARYNGITIS: ICD-10-CM

## 2021-04-07 DIAGNOSIS — K21.9 LARYNGOPHARYNGEAL REFLUX (LPR): ICD-10-CM

## 2021-04-16 ENCOUNTER — COMMUNICATION - HEALTHEAST (OUTPATIENT)
Dept: ADMINISTRATIVE | Facility: CLINIC | Age: 71
End: 2021-04-16

## 2021-04-16 DIAGNOSIS — M85.89 OSTEOPENIA OF MULTIPLE SITES: ICD-10-CM

## 2021-05-24 ENCOUNTER — RECORDS - HEALTHEAST (OUTPATIENT)
Dept: ADMINISTRATIVE | Facility: OTHER | Age: 71
End: 2021-05-24

## 2021-05-24 ENCOUNTER — RECORDS - HEALTHEAST (OUTPATIENT)
Dept: BONE DENSITY | Facility: CLINIC | Age: 71
End: 2021-05-24

## 2021-05-24 DIAGNOSIS — M85.89 OTHER SPECIFIED DISORDERS OF BONE DENSITY AND STRUCTURE, MULTIPLE SITES: ICD-10-CM

## 2021-05-25 ENCOUNTER — RECORDS - HEALTHEAST (OUTPATIENT)
Dept: ADMINISTRATIVE | Facility: CLINIC | Age: 71
End: 2021-05-25

## 2021-05-26 ENCOUNTER — RECORDS - HEALTHEAST (OUTPATIENT)
Dept: ADMINISTRATIVE | Facility: CLINIC | Age: 71
End: 2021-05-26

## 2021-05-27 ENCOUNTER — RECORDS - HEALTHEAST (OUTPATIENT)
Dept: ADMINISTRATIVE | Facility: CLINIC | Age: 71
End: 2021-05-27

## 2021-05-28 ENCOUNTER — RECORDS - HEALTHEAST (OUTPATIENT)
Dept: ADMINISTRATIVE | Facility: CLINIC | Age: 71
End: 2021-05-28

## 2021-05-28 NOTE — PROGRESS NOTES
Assessment and Plan:     1. Osteopenia   She takes vitamin D and calcium.  Due for follow up DEXA  - DXA Bone Density Scan; Future     2. Sensorineural hearing loss  Recent audiogram is on file.  She has mild to moderate SNHL.  We discussed the evidence of long term brain benefit from hearing aids.  She doesn't have to rush to get aids if doing OK with her living activities now.     3. Encounter for Medicare annual wellness exam  Her exam and history today reveal no acute iissues. We discussed healthy life style and diet.   - Comprehensive Metabolic Panel  - HM1(CBC and Differential)    The patient's current medical problems were reviewed.    I have had an Advance Directives discussion with the patient.  The following health maintenance schedule was reviewed with the patient and provided in printed form in the after visit summary:   Health Maintenance   Topic Date Due     ZOSTER VACCINES (2 of 3) 11/22/2012     PNEUMOCOCCAL POLYSACCHARIDE VACCINE AGE 65 AND OVER  07/20/2017     DXA SCAN  04/18/2019     FALL RISK ASSESSMENT  04/19/2019     TD 18+ HE  02/12/2020     MAMMOGRAM  05/08/2020     ADVANCE DIRECTIVES DISCUSSED WITH PATIENT  04/19/2023     COLONOSCOPY  05/30/2028     INFLUENZA VACCINE RULE BASED  Completed     PNEUMOCOCCAL CONJUGATE VACCINE FOR ADULTS (PCV13 OR PREVNAR)  Completed        Subjective:   Chief Complaint: Raj Kim is an 68 y.o. female here for an Annual Wellness visit.     HPI:  She feels OK, no new problems.  No unusual dyspnea, or chest or abdominal pain.  No changes in bowel or bladder symptoms.        Review of Systems:  Please see above.  The rest of the review of systems are negative for all systems.    Patient Care Team:  Lorenzo Dunn MD as PCP - General     Patient Active Problem List   Diagnosis     Osteoarthritis Of Multiple Sites     Vitamin B12 Deficiency     Hyperlipidemia     Peripheral Neuropathy     Papillary Carcinoma Of The Ovary     Radiation Therapy     Varicose  veins     Acquired lymphedema of leg     Personal history of ovarian cancer     Venous stasis     Lactose intolerance     RBBB     Benign polyp of large intestine     Lyme disease     Kyphosis (acquired) (postural)     Osteopenia of multiple sites     Sensorineural hearing loss, bilateral     Past Medical History:   Diagnosis Date     Osteopenia of multiple sites 2019     Ovarian cancer (H)      Peripheral neuropathy, secondary to drugs or chemicals      Radiation colitis      Sensorineural hearing loss, bilateral 2019     Vitamin B 12 deficiency       Past Surgical History:   Procedure Laterality Date     EXPLORATORY LAPAROTOMY      ovary/omentum abnormalities     FINGER SURGERY Right 2016    Dr. 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/   Social Needs     Financial resource strain: Not on file     Food insecurity:     Worry: Not on file     Inability: Not on file     Transportation needs:     Medical: Not on file     Non-medical: Not on file   Tobacco Use     Smoking status: Former Smoker     Smokeless tobacco: Never Used   Substance and Sexual Activity     Alcohol use: Yes     Comment: occasional     Drug use: No     Sexual activity: Not Currently   Lifestyle     Physical activity:     Days per week: Not on file     Minutes per session: Not on file     Stress: Not on file   Relationships     Social connections:     Talks on phone: Not on file     Gets together: Not on file     Attends [employer]  service: Not on file     Active member of club or organization: Not on file     Attends meetings of clubs or organizations: Not on file     Relationship status: Not on file     Intimate partner violence:     Fear of current or ex partner: Not on file     Emotionally abused: Not on file     Physically abused: Not on file     Forced sexual activity: Not on file   Other Topics Concern     Not on file   Social History Narrative    . One adopted son Surendra- lives in [place] (age 35 in 2018). 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     avid bird watcher/hobbyist...(nick Whitley/isaiah Gutierrez); sister-Oneida Roberts.-primary contact      Current Outpatient Medications   Medication Sig Dispense Refill     betamethasone dipropionate (DIPROLENE) 0.05 % ointment Apply topically as needed.        calcium citrate-vitamin D3 (CITRACAL + D) 315-250 mg-unit per tablet Take 1 tablet by mouth daily.       cholecalciferol, vitamin D3, (VITAMIN D3) 2,000 unit capsule Take 5,000 Units by mouth daily.              cyanocobalamin, vitamin B-12, 1,000 mcg Subl Place 1,000 mcg under the tongue daily.       desonide (DESOWEN) 0.05 % cream Apply topically as needed.        estradiol (ESTRACE) 0.5 MG tablet Take 1 tablet (0.5 mg total) by mouth daily. 90 tablet 3     FLAXSEED OIL ORAL Take by mouth.       gabapentin (NEURONTIN) 100 MG capsule Take 200 mg by mouth 2 times a day at 6:00 am and 4:00 pm.        Lactobacillus rhamnosus GG (CULTURELLE) 10-15 Billion cell capsule Take 1 capsule by mouth daily.       multivitamin with minerals (THERA-M) 9 mg iron-400 mcg Tab tablet Take 1 tablet by mouth daily.       OMEGA-3/DHA/EPA/FISH OIL (FISH OIL-OMEGA-3 FATTY ACIDS) 300-1,000 mg capsule Take 2 g by mouth 2 (two) times a day.       psyllium with dextrose (METAMUCIL JAR) powder Take by mouth 3 (three) times a day. 1 tsp daily       RED YEAST RICE ORAL Take by mouth.       valACYclovir (VALTREX) 500 MG tablet Take 500 mg by mouth as  "needed.        No current facility-administered medications for this visit.       Objective:   Vital Signs:   Visit Vitals  /64 (Patient Site: Left Arm, Patient Position: Sitting, Cuff Size: Adult Regular)   Pulse 75   Ht 5' 5.5\" (1.664 m)   Wt 147 lb 3.2 oz (66.8 kg)   LMP 02/04/1980   SpO2 94%   BMI 24.12 kg/m       PHYSICAL EXAM  General:  In NAD, alert and oriented  HEENT: no congestion or abnormality  Neck:  No adenopathy or thyroid enlargement  Chest:  Clear to auscultation, no wheeze or rhonchi  Heart: S1 S2 without murmur  Abdomen:  Soft, flat, and non-tender, no mass or guarding, or rebound.  Extremities:  Chronic edema with varicosities, no ulcer or inflammation  Neuro;  Speech and gait are normal, no motor weakness  Psych:  Thinking is clear, mood is normal, behavior normal.     Assessment Results 5/2/2019   Activities of Daily Living No help needed   Instrumental Activities of Daily Living No help needed   Get Up and Go Score Less than 12 seconds   Mini Cog Total Score 5   Some recent data might be hidden     A Mini-Cog score of 0-2 suggests the possibility of dementia, score of 3-5 suggests no dementia    Identified Health Risks:     Patient's advanced directive was discussed and I am comfortable with the patient's wishes.        "

## 2021-05-30 VITALS — BODY MASS INDEX: 23.78 KG/M2 | WEIGHT: 145.1 LBS

## 2021-05-30 VITALS — WEIGHT: 144 LBS | HEIGHT: 66 IN | BODY MASS INDEX: 23.14 KG/M2

## 2021-05-30 VITALS — BODY MASS INDEX: 23.99 KG/M2 | HEIGHT: 65 IN | WEIGHT: 144 LBS

## 2021-05-30 VITALS — BODY MASS INDEX: 24.11 KG/M2 | WEIGHT: 144.7 LBS | HEIGHT: 65 IN

## 2021-05-30 VITALS — WEIGHT: 145.9 LBS | BODY MASS INDEX: 23.91 KG/M2

## 2021-05-30 VITALS — WEIGHT: 144 LBS | BODY MASS INDEX: 23.99 KG/M2 | HEIGHT: 65 IN

## 2021-05-31 ENCOUNTER — RECORDS - HEALTHEAST (OUTPATIENT)
Dept: ADMINISTRATIVE | Facility: CLINIC | Age: 71
End: 2021-05-31

## 2021-05-31 VITALS — WEIGHT: 140.4 LBS | BODY MASS INDEX: 23.36 KG/M2

## 2021-06-01 VITALS — HEIGHT: 65 IN | BODY MASS INDEX: 23.66 KG/M2 | WEIGHT: 142 LBS

## 2021-06-02 VITALS — HEIGHT: 66 IN | BODY MASS INDEX: 23.66 KG/M2 | WEIGHT: 147.2 LBS

## 2021-06-03 ENCOUNTER — RECORDS - HEALTHEAST (OUTPATIENT)
Dept: ADMINISTRATIVE | Facility: CLINIC | Age: 71
End: 2021-06-03

## 2021-06-04 VITALS
WEIGHT: 133 LBS | HEIGHT: 65 IN | OXYGEN SATURATION: 98 % | HEART RATE: 82 BPM | SYSTOLIC BLOOD PRESSURE: 122 MMHG | BODY MASS INDEX: 22.16 KG/M2 | DIASTOLIC BLOOD PRESSURE: 68 MMHG

## 2021-06-05 NOTE — TELEPHONE ENCOUNTER
Refill Approved    Rx renewed per Medication Renewal Policy. Medication was last renewed on 3/20/19.    Lisbeth Melara, [facility] [place] Connection Triage/Med Refill 2/1/2020     Requested Prescriptions   Pending Prescriptions Disp Refills     estradiol (ESTRACE) 0.5 MG tablet [Pharmacy Med Name: ESTRADIOL 0.5MG TABLETS] 90 tablet 3     Sig: TAKE 1 TABLET BY MOUTH DAILY       Hormone Replacement Therapy Refill Protocol Passed - 1/31/2020  7:49 AM        Passed - PCP or prescribing provider visit in past 12 months       Last office visit with prescriber/PCP: Visit date not found OR same dept: Visit date not found OR same specialty: 3/17/2017 Shahida Benites MD  Last physical: 5/2/2019 Last MTM visit: Visit date not found     Next visit within 3 mo: Visit date not found  Next physical within 3 mo: Visit date not found  Prescriber OR PCP: Lorenzo Dunn MD  Last diagnosis associated with med order: 1. Menopause  - estradiol (ESTRACE) 0.5 MG tablet [Pharmacy Med Name: ESTRADIOL 0.5MG TABLETS]; TAKE 1 TABLET BY MOUTH DAILY  Dispense: 90 tablet; Refill: 3     If protocol passes may refill for 3 months.

## 2021-06-08 NOTE — TELEPHONE ENCOUNTER
RN cannot approve Refill Request    RN can NOT refill this medication Protocol failed and NO refill given.       Lisbeth Melara, [facility] Triage/Med Refill 5/27/2020    Requested Prescriptions   Pending Prescriptions Disp Refills     estradioL (ESTRACE) 0.5 MG tablet [Pharmacy Med Name: ESTRADIOL 0.5MG TABLETS] 90 tablet 3     Sig: TAKE 1 TABLET BY MOUTH DAILY.       Hormone Replacement Therapy Refill Protocol Failed - 5/25/2020  7:43 PM        Failed - PCP or prescribing provider visit in past 12 months       Last office visit with prescriber/PCP: Visit date not found OR same dept: Visit date not found OR same specialty: 3/17/2017 Shahida Benites MD  Last physical: 5/2/2019 Last MTM visit: Visit date not found     Next visit within 3 mo: Visit date not found  Next physical within 3 mo: Visit date not found  Prescriber OR PCP: Lorenzo Dunn MD  Last diagnosis associated with med order: 1. Menopause  - estradioL (ESTRACE) 0.5 MG tablet [Pharmacy Med Name: ESTRADIOL 0.5MG TABLETS]; TAKE 1 TABLET BY MOUTH DAILY  Dispense: 90 tablet; Refill: 0     If protocol passes may refill for 3 months.

## 2021-06-08 NOTE — PROGRESS NOTES
[facility] Follow Up Note    Assessment/Plan:  1. Cough  Present approximately 10 days.  Symptoms began initially with sore throat and progressed to a dry nonproductive cough.  No fever, chills, myalgias or gastrointestinal symptoms.  She is traveling to [place] on Friday.  Recommendation: Will prescribe guaifenesin with codeine for irritating cough.  Have given her prescription for Z-Munir to take in the event that her symptoms do not turn around within the acceptable period of time i.e. over the next 3-4 days.  Have educated patient that we don't want to treat viruses with antibiotics.  She is definitely in agreement.        Yahaira Goldstein MD    Chief Complaint:  Chief Complaint   Patient presents with     Cough       History of Present Illness:  Raj is a 66 y.o. female who is here today for evaluation of a cough.  She states she is been feeling not quite herself for the past 10 days.  Things began with a sore throat.  She was worried for strep and was seen in the urgent care.  Strep screen and throat cultures were subsequently negative.  She has been treating with home remedies such as over-the-counter cough syrups, throat lozenges etc.  She states she continues to cough.  She denies fever or chills, shortness of breath, myalgias or aches and pains, skin rashes, or gastrointestinal symptoms.  She states she does not feel horrible but is frustrated with the ongoing cough.  Of note, she is traveling on Friday.    Her history is negative for exposure to strep, cigarette smoke.  She is up-to-date on influenza vaccination as well as a pertussis vaccination    Review of Systems:  A comprehensive review of systems was performed and was otherwise negative    PFSH:  Social History: She is a nonsmoker with no smoke exposure  History   Smoking Status     Former Smoker   Smokeless Tobacco     Never Used       Past History: Reviewed in the snapshot  Current Outpatient Prescriptions   Medication Sig Dispense  Refill     betamethasone dipropionate (DIPROLENE) 0.05 % ointment Apply topically as needed.        calcium citrate-vitamin D3 (CITRACAL + D) 315-250 mg-unit per tablet Take 1 tablet by mouth daily.       cholecalciferol, vitamin D3, (VITAMIN D3) 2,000 unit cap Take 5,000 Units by mouth daily.        desonide (DESOWEN) 0.05 % cream Apply topically as needed.        DOCOSAHEXANOIC ACID/EPA (FISH OIL ORAL) Take 1 tablet by mouth 2 times a day at 6:00 am and 4:00 pm.       estradiol (ESTRACE) 0.5 MG tablet TAKE 1 TABLET DAILY. 90 tablet 3     FLAXSEED ORAL Take 1 capsule by mouth 2 (two) times a day.        gabapentin (NEURONTIN) 100 MG capsule Take 300 mg by mouth 2 times a day at 6:00 am and 4:00 pm.       Lactobacillus rhamnosus GG (CULTURELLE) 10-15 Billion cell capsule Take 1 capsule by mouth daily.       multivitamin capsule Take 1 capsule by mouth daily.       red yeast rice 600 mg cap Take 2 capsules by mouth 2 times a day at 6:00 am and 4:00 pm.       valACYclovir (VALTREX) 500 MG tablet Take 500 mg by mouth as needed.        azithromycin (ZITHROMAX Z-GABBY) 250 MG tablet Take 1 tablet (250 mg total) by mouth daily for 5 days. 2 tabs on day one then one tab daily 6 tablet 0     codeine-guaiFENesin (GUAIFENESIN AC)  mg/5 mL liquid Take 5 mL by mouth 3 (three) times a day as needed for cough. 240 mL 0     Current Facility-Administered Medications   Medication Dose Route Frequency Provider Last Rate Last Dose     cyanocobalamin injection 1,000 mcg  1,000 mcg Intramuscular Q30 Days Lorenzo Pelletier MD   1,000 mcg at 11/28/16 1400     cyanocobalamin injection 1,000 mcg  1,000 mcg Intramuscular Q30 Days Lorenzo Pelletier MD   1,000 mcg at 01/04/17 1327       Family History: Reviewed in the snapshot    Physical Exam:  General Appearance:   She is pleasant and well-appearing and in no acute distress  Vitals:    01/11/17 0922   BP: 114/66   Patient Site: Left Arm   Patient Position: Sitting   Cuff Size:  Adult Regular   Pulse: 83   Temp: 97.8  F (36.6  C)   TempSrc: Oral   SpO2: 98%     Wt Readings from Last 3 Encounters:   01/07/17 145 lb 1.6 oz (65.8 kg)   10/27/16 148 lb 3.2 oz (67.2 kg)   07/21/16 144 lb 3 oz (65.4 kg)     There is no height or weight on file to calculate BMI.    Head and neck exam is unremarkable.  Throat is clear.  Ears are within normal limits.  Neck is supple without lymphadenopathy  Lungs are completely clear to auscultation and percussion  Cardiac exam reveals regular rate and rhythm with no murmurs or gallops    Data Review:    Analysis and Summary of Old Records (2): Cursory review of chart and urgent care note      Records Requested (1): 0      Other History Summarized (from other people in the room) (2): 0    Radiology Tests Summarized (XRAY/CT/MRI/DXA) (1): 0    Labs Reviewed (1): Reviewed strep screen and culture    Medicine Tests Reviewed (EKG/ECHO/COLONOSCOPY/EGD) (1): 0    Independent Review of EKG or X-RAY (2): 0    0

## 2021-06-08 NOTE — PROGRESS NOTES
ASSESSMENT:   1. Throat pain  Rapid Strep A Screen-Throat    Group A Strep, RNA Direct Detection, Throat   2. Pharyngitis       Results for orders placed or performed in visit on 01/07/17   Rapid Strep A Screen-Throat   Result Value Ref Range    Rapid Strep A Antigen No Group A Strep detected No Group A Strep detected       PLAN:  Sore throat  Likely viral  Push fluids, get extra rest  Recommend hot tea with lemon/honey, lozenges, chloraseptic spray or salt water gargles to soothe your throat  Return to clinic if symptoms are not improving as expected or if worsening in any way.         SUBJECTIVE:   Raj Kmi is a 66 y.o. female presents today with cold symptoms for 5 days. She has had sore throat, hoarse, dry cough but denies congestion, post nasal drip, myalgias, headache, fever and chills. Sick contacts: none. Has tried lozenges with some relief.     Past Medical History   Diagnosis Date     Ovarian cancer 1980     Peripheral neuropathy, secondary to drugs or chemicals      Vitamin B 12 deficiency        History   Smoking Status     Former Smoker   Smokeless Tobacco     Never Used       Current Medications:  Current Outpatient Prescriptions   Medication Sig Dispense Refill     betamethasone dipropionate (DIPROLENE) 0.05 % ointment Apply topically as needed.        calcium citrate-vitamin D3 (CITRACAL + D) 315-250 mg-unit per tablet Take 1 tablet by mouth daily.       cholecalciferol, vitamin D3, (VITAMIN D3) 2,000 unit cap Take 5,000 Units by mouth daily.        desonide (DESOWEN) 0.05 % cream Apply topically as needed.        DOCOSAHEXANOIC ACID/EPA (FISH OIL ORAL) Take 1 tablet by mouth 2 times a day at 6:00 am and 4:00 pm.       FLAXSEED ORAL Take 1 capsule by mouth 2 (two) times a day.        gabapentin (NEURONTIN) 100 MG capsule Take 300 mg by mouth 2 times a day at 6:00 am and 4:00 pm.       Lactobacillus rhamnosus GG (CULTURELLE) 10-15 Billion cell capsule Take 1 capsule by mouth daily.        multivitamin capsule Take 1 capsule by mouth daily.       red yeast rice 600 mg cap Take 2 capsules by mouth 2 times a day at 6:00 am and 4:00 pm.       valACYclovir (VALTREX) 500 MG tablet Take 500 mg by mouth as needed.        estradiol (ESTRACE) 0.5 MG tablet TAKE 1 TABLET DAILY. 90 tablet 3     Current Facility-Administered Medications   Medication Dose Route Frequency Provider Last Rate Last Dose     cyanocobalamin injection 1,000 mcg  1,000 mcg Intramuscular Q30 Days Lorenzo Pelletier MD   1,000 mcg at 11/28/16 1400     cyanocobalamin injection 1,000 mcg  1,000 mcg Intramuscular Q30 Days Lorenzo Pelletier MD   1,000 mcg at 01/04/17 1327       Allergies:   Allergies   Allergen Reactions     Celecoxib Headache     Cephalosporins Hives     Hydrocodone-Acetaminophen Headache     Penicillins Hives       OBJECTIVE:   Vitals:    01/07/17 1202   BP: 102/68   Pulse: 87   Resp: 16   Temp: 98.3  F (36.8  C)   TempSrc: Oral   SpO2: 98%   Weight: 145 lb 1.6 oz (65.8 kg)     Physical exam reveals a pleasant 66 y.o. female.   Appears healthy, alert and cooperative.  Eyes:  ASHLY, EOMI  Ears:  normal TMs bilaterally and normal canals bilaterally  Nose:    Mucosa normal. Scant, clear rhinorrhea.septum midline, normal mucosa and clear rhinorrhea  Mouth:  Mucosa pink and moist.  normal-appearing mucosa and no pharyngitis, no exudate   Neck: few small anterior cervical nodes  Sinuses: nontender with palpation  Lungs: Chest is clear, no wheezing or rales. Symmetric air entry throughout both lung fields.  Heart: regular rate and rhythm, no murmur, rub or gallop

## 2021-06-09 NOTE — PROGRESS NOTES
[facility] Daily Progress     Patient Name: Raj Kim  Date: 3/21/2017  Visit #: 4  PTA visit #:  0  Referral Diagnosis: Thoracic spine pain, lumbar spine pain  Referring provider: Luc Thao DO  Visit Diagnosis:     ICD-10-CM    1. Chronic bilateral thoracic back pain M54.6     G89.29    2. Abnormal posture R29.3    3. Muscle weakness (generalized) M62.81          Assessment:     HEP/POC compliance is  good .  Patient is benefitting from skilled physical therapy and is making steady progress toward functional goals.  Patient is appropriate to continue with skilled physical therapy intervention, as indicated by initial plan of care.   Pt tolerating core, scapular, and lumbar stabilization classes well but has demonstrated limited improvement in posture     Goal Status:  Pt. will demonstrate/verbalize independence in self-management of condition in : 6 weeks  Pt. will be independent with home exercise program in : 6 weeks  Pt. will improve posture : and demonstrate posture with minimal to no cuing;and maintain posture for;10 minutes;in sitting;in standing;in walking;for working;for walking;in 6 weeks  Pt. will be able to walk : 6 blocks;with less pain;with less difficulty;for household mobility;for community mobility;for exercise/recreation;in 6 weeks  No Data Recorded    Plan / Patient Education:     Continue with initial plan of care.  Progress with home program as tolerated.  Progress PPT exercises as tolerated    Subjective:     Pain Ratin  Pt reports back is feeling fine. Pt reports she went to a informational class about osteoporosis where she also got 3 exercises for the back pain.       Objective:   Reviewed bird dog and prone Y's and T's- pt able to perform these 3 exercises on SB for home   Added PPT with A/L leg extension for core strength   Pt educated about use of tennis balls for cervical release technique at home       Treatment Today     TREATMENT MINUTES COMMENTS   Evaluation      Self-care/ Home management     Manual therapy 6 -MFR to thoracic spine   -Grade II>III PA mobilizations to mid thoracic spine    Neuromuscular Re-education     Therapeutic Activity     Therapeutic Exercises 24 See exercise flow sheet   Gait training     Modality__________________                Total 30    Blank areas are intentional and mean the treatment did not include these items.       Dorota Mas  3/21/2017

## 2021-06-09 NOTE — PROGRESS NOTES
Raj Kim  1950      Assessment and Plan:  1. Health maintenance- stable. 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 RTC one yr or WTM exam - turns 67 in June or RTC one yr      Chief Complaint: annual    Visit diagnoses:    1. Health care maintenance     2. Menopause  estradiol (ESTRACE) 0.5 MG tablet   3. Hyperlipemia  Comprehensive Metabolic Panel    HM2(CBC w/o Differential)    Thyroid Stimulating Hormone (TSH)    Lipid Cascade    Urinalysis-UC if Indicated   4. B12 deficiency  Vitamin B12     Patient Active Problem List   Diagnosis     Osteoarthritis Of Multiple Sites     Vitamin B12 Deficiency     Hyperlipidemia     Peripheral Neuropathy     Papillary Carcinoma Of The Ovary     Radiation Therapy     Varicose veins     Acquired lymphedema of leg     Personal history of ovarian cancer     Venous stasis     Lactose intolerance     RBBB     Benign polyp of large intestine     CMC arthritis, thumb, degenerative     Lyme disease     Osteoporosis     Past Medical History:   Diagnosis Date     Ovarian cancer 1980     Peripheral neuropathy, secondary to drugs or chemicals      Vitamin B 12 deficiency      Past Surgical History:   Procedure Laterality Date     HYSTERECTOMY  1980     OOPHORECTOMY Bilateral 1980     TN EXPLORATORY OF ABDOMEN      Description: Exploratory Laparotomy;  Recorded: 02/08/2008;     TN LAP,CHOLECYSTECTOMY      Description: Cholecystectomy Laparoscopic;  Proc Date: 08/17/2012;  Comments: Performed at [facility] by Dr. Gonsales     TN TOTAL ABDOM HYSTERECTOMY      Description: Hysterectomy;  Recorded: 02/08/2008;     Social History     Social History     Marital status:      Spouse name: N/A     Number of children: N/A     Years of education: N/A     Occupational History     Not on file.     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 .  One child-son.                   Family History   Problem Relation Age of Onset     Colon cancer Maternal Aunt      Diabetes Father        Meds:  Current Outpatient Prescriptions   Medication Sig Dispense Refill     betamethasone dipropionate (DIPROLENE) 0.05 % ointment Apply topically as needed.        calcium citrate-vitamin D3 (CITRACAL + D) 315-250 mg-unit per tablet Take 1 tablet by mouth daily.       cholecalciferol, vitamin D3, (VITAMIN D3) 2,000 unit cap Take 5,000 Units by mouth daily.        desonide (DESOWEN) 0.05 % cream Apply topically as needed.        estradiol (ESTRACE) 0.5 MG tablet TAKE 1 TABLET DAILY. 90 tablet 3     gabapentin (NEURONTIN) 100 MG capsule Take 200 mg by mouth 2 times a day at 6:00 am and 4:00 pm.        valACYclovir (VALTREX) 500 MG tablet Take 500 mg by mouth as needed.        Current Facility-Administered Medications   Medication Dose Route Frequency Provider Last Rate Last Dose     cyanocobalamin injection 1,000 mcg  1,000 mcg Intramuscular Q30 Days Lorenzo Pelletier MD   1,000 mcg at 04/03/17 1647       Allergies   Allergen Reactions     Celecoxib Headache     Cephalosporins Hives     Hydrocodone-Acetaminophen Headache     Penicillins Hives       ROS: complete review of symptoms otherwise negative except as noted below    S:  Doing well some thumb issues may return to hand surgeon but not sure. 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 Doing remarkably well compared to last yr. No depressive symptoms. Enjoying life.        O:   Vitals:    04/03/17 0943   BP: 130/68   Patient Site: Left Arm   Patient Position: Sitting   Cuff Size: Adult Regular   Pulse: 67   Resp: 15   Temp: 98.6  F (37  C)   TempSrc: Oral   Weight: 144 lb 11.2 oz (65.6 kg)   Height: 5' 5\" (1.651 m)       Physical Exam:  General- pleasant " spontaneous smiles  VS- see above  HEENT- neg   Neck- no adenopathy/thyromegaly/bruits  Chest- clear   Cor- reg no murmurs/gallops/ectopics  Abdomen- soft non tender, no masses; no organomegaly  Extremities: no edema, good distal pulses  Neuro- Cr. NN-  intact, alert,   Skin- no suspicious lesions for malignancy  Lymph- no pathologic nodes in neck/axilla/groin  Musculoskeletal-  Chronic hand tenderness bilat 1st MCP; no acute swelling or inflammation  Breasts- negative    Recent Results (from the past 240 hour(s))   Comprehensive Metabolic Panel   Result Value Ref Range    Sodium 141 136 - 145 mmol/L    Potassium 4.4 3.5 - 5.0 mmol/L    Chloride 104 98 - 107 mmol/L    CO2 28 22 - 31 mmol/L    Anion Gap, Calculation 9 5 - 18 mmol/L    Glucose 97 70 - 125 mg/dL    BUN 13 8 - 22 mg/dL    Creatinine 0.76 0.60 - 1.10 mg/dL    GFR MDRD Af Amer >60 >60 mL/min/1.73m2    GFR MDRD Non Af Amer >60 >60 mL/min/1.73m2    Bilirubin, Total 0.6 0.0 - 1.0 mg/dL    Calcium 10.1 8.5 - 10.5 mg/dL    Protein, Total 6.6 6.0 - 8.0 g/dL    Albumin 3.6 3.5 - 5.0 g/dL    Alkaline Phosphatase 61 45 - 120 U/L    AST 26 0 - 40 U/L    ALT 34 0 - 45 U/L   HM2(CBC w/o Differential)   Result Value Ref Range    WBC 7.0 4.0 - 11.0 thou/uL    RBC 4.71 3.80 - 5.40 mill/uL    Hemoglobin 13.8 12.0 - 16.0 g/dL    Hematocrit 41.0 35.0 - 47.0 %    MCV 87 80 - 100 fL    MCH 29.3 27.0 - 34.0 pg    MCHC 33.6 32.0 - 36.0 g/dL    RDW 11.7 11.0 - 14.5 %    Platelets 300 140 - 440 thou/uL    MPV 8.1 7.0 - 10.0 fL   Vitamin B12   Result Value Ref Range    Vitamin B-12 574 213 - 816 pg/mL   Thyroid Stimulating Hormone (TSH)   Result Value Ref Range    TSH 2.54 0.30 - 5.00 uIU/mL   Lipid Cascade   Result Value Ref Range    Cholesterol 175 <=199 mg/dL    Triglycerides 173 (H) <=149 mg/dL    HDL Cholesterol 53 >=50 mg/dL    LDL Calculated 87 <=129 mg/dL    Patient Fasting > 8hrs? Yes    Urinalysis-UC if Indicated   Result Value Ref Range    Color, UA Yellow Colorless,  Yellow, Straw, Light Yellow    Clarity, UA Clear Clear    Glucose, UA Negative Negative    Bilirubin, UA Negative Negative    Ketones, UA Negative Negative    Specific Gravity, UA <=1.005 1.005 - 1.030    Blood, UA Negative Negative    pH, UA 6.0 5.0 - 8.0    Protein, UA Negative Negative mg/dL    Urobilinogen, UA 0.2 E.U./dL 0.2 E.U./dL, 1.0 E.U./dL    Nitrite, UA Negative Negative    Leukocytes, UA Negative Negative         Lorenzo Pelletier MD

## 2021-06-09 NOTE — PROGRESS NOTES
[facility] Daily Progress     Patient Name: Raj Kim  Date: 3/24/2017  Visit #: 5  PTA visit #:  0  Referral Diagnosis: Thoracic spine pain, lumbar spine pain  Referring provider: Luc Thao DO  Visit Diagnosis:     ICD-10-CM    1. Chronic bilateral thoracic back pain M54.6     G89.29    2. Abnormal posture R29.3    3. Muscle weakness (generalized) M62.81          Assessment:     HEP/POC compliance is  good .  Patient is benefitting from skilled physical therapy and is making steady progress toward functional goals.  Patient is appropriate to continue with skilled physical therapy intervention, as indicated by initial plan of care.   Pt tolerating progression of core, scapular, and lumbar stabilization exercises well but has demonstrated limited improvement in posture     Goal Status:  Pt. will demonstrate/verbalize independence in self-management of condition in : 6 weeks  Pt. will be independent with home exercise program in : 6 weeks  Pt. will improve posture : and demonstrate posture with minimal to no cuing;and maintain posture for;10 minutes;in sitting;in standing;in walking;for working;for walking;in 6 weeks  Pt. will be able to walk : 6 blocks;with less pain;with less difficulty;for household mobility;for community mobility;for exercise/recreation;in 6 weeks  No Data Recorded    Plan / Patient Education:     Continue with initial plan of care.  Progress with home program as tolerated.  Progress PPT exercises as tolerated    Subjective:     Pain Ratin  Pt reports no pain today.       Objective:   Added PPT with up-up; down-down of LEs to HEP for core stabilization   Also added corner pec stretch to HEP       Treatment Today     TREATMENT MINUTES COMMENTS   Evaluation     Self-care/ Home management     Manual therapy 10 -MFR and STM to thoracic spine    Neuromuscular Re-education     Therapeutic Activity     Therapeutic Exercises 20 See exercise flow sheet   Gait training      Modality__________________                Total 30    Blank areas are intentional and mean the treatment did not include these items.       Dorota Mas  3/24/2017

## 2021-06-09 NOTE — PROGRESS NOTES
Assessment/Plan:      Diagnoses and all orders for this visit:    Thoracic spine pain    Lumbar spine pain    Sagittal plane imbalance        Assessment:    1. Chronic cervical thoracic and lumbar spine pain and fatigue. Likely related to  sagittal imbalance with increased thoracic kyphosis. She is status post radiation to the abdomen and pelvis for ovarian cancer several years ago and has significant atrophy of the lumbar paraspinals which is likely responsible for her symptoms.  Making very minimal gains with physical therapy but still functioning well.         Discussion:    1.  Overall she has some mild improvement but still has significant fatigue.  We discussed that imaging of the x-rays and MRI.  We discussed treatment options.  Overall the good news is that she has no pain only fatigue.  2.  She will continue her physical therapy exercises and [employer] one more time to ensure she is doing them properly.  3.  She will try home exercise with yoga or Pilates    4.  She is not interested in surgical referral at this time.  She can certainly contact us if any new issues arise and she will just continue to strengthen on her own for now and see how she does over the next several months.  She can follow-up as needed    20 minutes were spent with this patient in addition to any procedure with greater than 15 minutes in counseling and coordination of care.      It was our pleasure caring for your patient today, if there any questions or concerns please do not hesitate to contact us.      Subjective:   Patient ID: Raj Kim is a 66 y.o. female.    History of Present Illness: The patient presents for evaluation of thoracic and lumbar pain and fatigue.  She does get some achiness in the thoracic spine but in general she is just fatigued.  Her pain is a 4/10 today 6/10 at worst.  She has been working with Dorota Mas and physical therapy.  I did review the notes.  She is doing her home exercises as numerous  exercises that she attempts to do daily.  She is making slow steady progress but is still having issues with fatigue.  She has not yet started yoga on her own but is taking class tomorrow.  She did have x-rays done as well.  No new symptoms no radiation down the legs today.  No bowel or bladder incontinence.    Imaging: I personally reviewed scoliosis imaging.  She does have increased thoracic kyphosis measuring 60 .  Sagittal plane imbalance difficult to measure.  Appears neutral with the increased kyphosis of the thoracic spine.  There are 3 old rib fractures on the right in the mid rib cage.    Review of systems: Does have some numbness in the left leg.  No bowel or bladder incontinence weakness headache dizziness nausea vomiting blurred vision or balance changes.    Past Medical History:   Diagnosis Date     Ovarian cancer 1980     Peripheral neuropathy, secondary to drugs or chemicals      Vitamin B 12 deficiency        The following portions of the patient's history were reviewed and updated as appropriate: allergies, current medications, past family history, past medical history, past social history, past surgical history and problem list.      Objective:   Physical Exam:    Vitals:    04/06/17 0822   BP: 130/56   Pulse: 86       General: Alert and oriented with normal affect. Attention, knowledge, memory, and language are intact. No acute distress.   Eyes: Sclerae are clear.  Respirations: Unlabored. CV: No lower extremity edema.   Increased thoracic kyphotic curve.  Sensation is intact to light touch throughout the   lower extremities.  Reflexes are   2+ patellar and 1+ Achilles .    Manual muscle testing reveals:  Right /Left out of 5     5/5 knee flexors  5/5 knee extensors  5/5 ankle plantar flexors  5/5 ankle dorsiflexors  5/5  EHL and ankle evertors

## 2021-06-09 NOTE — PROGRESS NOTES
Assessment/Plan:      Diagnoses and all orders for this visit:    Thoracic spine pain  -     Ambulatory referral to PT/OT    Lumbar spine pain  -     Ambulatory referral to PT/OT    Sagittal plane imbalance  -     XR Scoliosis AP and Lateral Standing; Future; Expected date: 3/3/17  -     Ambulatory referral to PT/OT        Assessment:    1.  Chronic cervical thoracic and lumbar spine pain and fatigue.  Likely related to significant sagittal imbalance.  She is status post radiation to the abdomen and pelvis for ovarian cancer several years ago and has significant atrophy of the lumbar paraspinals which is likely responsible for her symptoms.      Discussion:    1.  I discussed the diagnosis and treatment options.  We discussed the need for further diagnostics, we discussed options of therapy, medications, surgical referral.  She is not wanting surgery, we'll start conservatively.    2.  We'll obtain AP and lateral standing films of the lumbar spine and scoliosis.  Evaluate her sagittal imbalance which is likely fairly significant.    3.  Start physical therapy for lumbar thoracic and cervical strengthening and stabilization with postural retraining.    4.  She can also start with yogurt christine chi on her own.      5. Follow-up 1-2 months.      It was our pleasure caring for your patient today, if there any questions or concerns please do not hesitate to contact us.      Subjective:   Patient ID: Raj Kim is a 66 y.o. female.    History of Present Illness: Patient presents at the request of Dr. Nguyễn for evaluation of cervical thoracic and lumbar spine issues.  She has a generalized ache and decreased endurance in her spine and thus the major issue she presents with today.  In the 1980s as well as a question of ovarian cancer and omentum cancer perceived radiation to the abdomen and pelvis.  Throughout the years she has developed significant weakness throughout her back and prominence of the spinous process.  She  has been through physicians neck and back in 3 occasions and has not continue with her exercise but despite this therapy modalities she has had persistent issues in her back.    She has an achy pain in her back with decreased endurance with standing walking and doing activities.  Her back feels weak.  She gets some numbness and tingling down the left leg in the front of the leg and sees Dr. Wilks in neurology for this for potential femoral nerve issue related to the radiation.  She is a difficult time walking upright as difficult to lift objects or carry objects in front of her secondary to the weakness in her back.  Chest is a chronic stiffness in her neck no numbness or tingling or weakness in her arms.  She has had several images in the past but nothing recent.    Imaging: MRI of the lumbar spine from 2012 personally reviewed.  This is the report and images.  This shows normal disc height with no nerve impingement centrally or in the foramen.  There is pronounced atrophy of the dorsal paraspinal musculature bilaterally.  MRI report of the thoracic spine from 2008 reveals unremarkable MRI of the thoracic spine including no disc herniations spinal cord compression and nerve impingement or paraspinal atrophy.    I did review recent CT of the abdomen and pelvis.  Disc height.  Be well-maintained with no sign of disc herniation on my review images.  Pronounced muscle atrophy in the lower lumbar spine again up to the thoracolumbar junction.    Review of systems: No new bowel or bladder issues.  She does have some chronic bowel issues following radiation.  She has some lymphedema in her feet and sees Dr. Spencer.  Has had occasional diarrhea.  Has some pain in her left hand.  Sleeping fine.  Continues to work.  Remainder of 12 point review systems negative unless listed above.    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.    The following portions of the patient's history were reviewed and updated as appropriate: allergies, current medications, past family history, past medical history, past social history, past surgical history and problem list.    WHO5:25      Objective:   Physical Exam:    Vitals:    03/03/17 1322   BP: 136/65   Pulse: 81       General:  Well-appearing female in no acute distress.  Pleasant,   cooperative, and interactive throughout the examination and interview.  CV: No lower extremity edema.  Lymphatics: No cervical lymphadenopathy palpated.  Eyes: sclera clear.  Skin: No rashes or lesions seen.  Respirations unlabored.  MSK: Gait is cautious, significant sagittal imbalance with head forward and increased thoracic kyphotic curve..  Able to heel-toe walk without difficulty.    Negative Romberg.  Spine: Increased cervical lordotic curve thoracic kyphotic curve and flattened lumbar lordotic curve.  Prominent spinous process through the lower thoracic and upper lumbar spine..  Decreased thoracic extension..  Palpation: No significant tenderness at the thoracic or lumbar paraspinals or spinous process.   Extremities: Full range of motion of the   elbows, and wrists with no effusions or tenderness to palpation.    Full range of motion of the hips, knees, and ankles from a seated position with no effusions or tenderness to palpation.    Neurologic exam: Mental status: Patient is alert and oriented with normal affect.  Attention, knowledge, memory, and language are intact.  Normal coordination throughout the examination.  Reflexes are 2+ and symmetric biceps, triceps, brachioradialis, trace left, 2+ right patellar, and 1+ bilateral Achilles with down-going toes and Negative Kunal's.  Sensation is intact to light touch throughout the upper and lower extremities bilaterally.  Manual muscle testing reveals  5 out of 5   in the hip flexors,  knee flexors/extensors, ankle plantar flexors, ankle   dorsiflexors, and EHL.  Upper extremity strength appears normal through the major muscle groups of the upper extremity is bilaterally.  Normal muscle bulk and tone.     Negative seated   straight leg raise bilaterally.

## 2021-06-09 NOTE — PROGRESS NOTES
Assessment/Plan:        1. Osteoporosis  DXA Bone Density Scan       Return in about 1 year (around 3/17/2018) for Recheck.    There are no Patient Instructions on file for this visit.    Chief Complaint   Patient presents with     Osteoporosis Follow Up     67 yo female with history of osteoporosis and treatment with Actonel and Prolia. Last Prolia dose in 4/2015. Last DXA in 4/2016 showed osteopenia with moderate fracture risk.  She is still taking a low-dose of estrogen, calcium and vitamin D.  She does not report any new fractures.  She does have a history of osteoporosis in her father who had a hip fracture. she does have a history of rib fractures.  We will repeat bone density scan this year.  If it stays stable and her fracture risk is not high I would not continue any active treatment.  She decided to continue with HRT.    Visit Vitals     /70     Pulse 66     Wt 145 lb 14.4 oz (66.2 kg)     LMP 02/04/1980     BMI 23.91 kg/m2     15 minutes spent with the patient and more then 50 % of the time in counseling.  This note has been dictated using voice recognition software. Any grammatical or context distortions are unintentional and inherent to the software      Patient Active Problem List   Diagnosis     Osteoarthritis Of Multiple Sites     Vitamin B12 Deficiency     Hyperlipidemia     Peripheral Neuropathy     Papillary Carcinoma Of The Ovary     Radiation Therapy     Varicose veins     Acquired lymphedema of leg     Personal history of ovarian cancer     Venous stasis     Lactose intolerance     RBBB     Benign polyp of large intestine     CMC arthritis, thumb, degenerative     Lyme disease     Osteoporosis       Current Outpatient Prescriptions on File Prior to Visit   Medication Sig Dispense Refill     betamethasone dipropionate (DIPROLENE) 0.05 % ointment Apply topically as needed.        calcium citrate-vitamin D3 (CITRACAL + D) 315-250 mg-unit per tablet Take 1 tablet by mouth daily.        cholecalciferol, vitamin D3, (VITAMIN D3) 2,000 unit cap Take 5,000 Units by mouth daily.        desonide (DESOWEN) 0.05 % cream Apply topically as needed.        DOCOSAHEXANOIC ACID/EPA (FISH OIL ORAL) Take 1 tablet by mouth 2 times a day at 6:00 am and 4:00 pm.       estradiol (ESTRACE) 0.5 MG tablet TAKE 1 TABLET DAILY. 90 tablet 3     FLAXSEED ORAL Take 1 capsule by mouth 2 (two) times a day.        gabapentin (NEURONTIN) 100 MG capsule Take 200 mg by mouth 2 times a day at 6:00 am and 4:00 pm.        Lactobacillus rhamnosus GG (CULTURELLE) 10-15 Billion cell capsule Take 1 capsule by mouth daily.       multivitamin capsule Take 1 capsule by mouth daily.       red yeast rice 600 mg cap Take 2 capsules by mouth 2 times a day at 6:00 am and 4:00 pm.       valACYclovir (VALTREX) 500 MG tablet Take 500 mg by mouth as needed.        [DISCONTINUED] codeine-guaiFENesin (GUAIFENESIN AC)  mg/5 mL liquid Take 5 mL by mouth 3 (three) times a day as needed for cough. 240 mL 0     Current Facility-Administered Medications on File Prior to Visit   Medication Dose Route Frequency Provider Last Rate Last Dose     cyanocobalamin injection 1,000 mcg  1,000 mcg Intramuscular Q30 Days Lorenzo Pelletier MD   1,000 mcg at 11/28/16 1400     cyanocobalamin injection 1,000 mcg  1,000 mcg Intramuscular Q30 Days Lorenzo Pelletier MD   1,000 mcg at 03/13/17 6027

## 2021-06-09 NOTE — PROGRESS NOTES
[facility] Daily Progress     Patient Name: Raj Kim  Date: 3/28/2017  Visit #: 6  PTA visit #:  0  Referral Diagnosis: Thoracic spine pain, lumbar spine pain  Referring provider: Luc Thao DO  Visit Diagnosis:     ICD-10-CM    1. Chronic bilateral thoracic back pain M54.6     G89.29    2. Abnormal posture R29.3    3. Muscle weakness (generalized) M62.81          Assessment:     HEP/POC compliance is  good .  Patient is benefitting from skilled physical therapy and is making steady progress toward functional goals.  Patient is appropriate to continue with skilled physical therapy intervention, as indicated by initial plan of care.     Goal Status:  Pt. will demonstrate/verbalize independence in self-management of condition in : 6 weeks  Pt. will be independent with home exercise program in : 6 weeks  Pt. will improve posture : and demonstrate posture with minimal to no cuing;and maintain posture for;10 minutes;in sitting;in standing;in walking;for working;for walking;in 6 weeks  Pt. will be able to walk : 6 blocks;with less pain;with less difficulty;for household mobility;for community mobility;for exercise/recreation;in 6 weeks  No Data Recorded    Plan / Patient Education:     Continue with initial plan of care.  Progress with home program as tolerated.  Progress PPT exercises as tolerated    Subjective:     Pain Ratin  Pt reports feeling tired and slow moving today because of her long work stretch.       Objective:   Added thread the needle with a foam roller to HEP   Did not progress any other exercises today    Treatment Today     TREATMENT MINUTES COMMENTS   Evaluation     Self-care/ Home management     Manual therapy 10 -Grade II>III PA mobilizations to T7-9  -Grade II oscillatory lateral mobilizations to T7-9   Neuromuscular Re-education     Therapeutic Activity     Therapeutic Exercises 15 See exercise flow sheet   Gait training     Modality__________________                Total  25    Blank areas are intentional and mean the treatment did not include these items.       Dorota Mas  3/28/2017

## 2021-06-09 NOTE — PROGRESS NOTES
[facility]   [place]-Pelvic Initial Evaluation    Patient Name: Raj Kim  Date of evaluation: 3/7/2017  Referral Diagnosis: Thoracic spine pain  Referring provider: Luc Thao DO  Visit Diagnosis:     ICD-10-CM    1. Chronic bilateral thoracic back pain M54.6     G89.29    2. Abnormal posture R29.3    3. Muscle weakness (generalized) M62.81        Assessment:      Impairments in  pain, posture, ROM, joint mobility, strength, motor function  Patient's signs and symptoms are consistent with thoracic back pain due to abnormal posture from weakened spinal stabilizers .  Barriers to achieving goals as noted in the assessment section may affect outcome.  Prognosis to achieve goals is  fair   Skilled PT is required to decrease pain and improve posture for improved tolerance to her functional activites such as lifting, bending, and walking   Pt. is appropriate for skilled PT intervention as outlined in the Plan of Care (POC).  Pt. is a good candidate for skilled PT services to improve pain levels and function.    Goals:  Pt. will demonstrate/verbalize independence in self-management of condition in : 6 weeks  Pt. will be independent with home exercise program in : 6 weeks  Pt. will improve posture : and demonstrate posture with minimal to no cuing;and maintain posture for;10 minutes;in sitting;in standing;in walking;for working;for walking;in 6 weeks  Pt. will be able to walk : 6 blocks;with less pain;with less difficulty;for household mobility;for community mobility;for exercise/recreation;in 6 weeks  No Data Recorded    Patient's expectations/goals are realistic.    Barriers to Learning or Achieving Goals:  Chronicity of the problem.       Plan / Patient Instructions:        Plan of Care:   Communication with: Referral Source  Patient Related Instruction: Nature of Condition;Treatment plan and rationale;Self Care instruction;Basis of treatment;Posture;Precautions;Next steps;Expected outcome  Number  "of Visits: up to 12 visits   Therapeutic Exercise: ROM;Stretching;Strengthening  Neuromuscular Reeducation: kinesio tape;posture;core;balance/proprioception  Manual Therapy: soft tissue mobilization;myofascial release;joint mobilization  Equipment: theraband    Plan for next visit: Continue with POC, continue with thoracic mobility as tolerated, add cat/camel stretch next time. Add prone A/L arm and leg lifts and quadruped exercises for spinal stability. Will also try prone Y's, T's and I's for scapular stabilization      Subjective:         Social information:   Occupation:RN   Work Status:Working full time    History of Present Illness:    Raj is a 66 y.o. female who presents to therapy today with complaints of upper back pain. Pt reports she has had the pain for about 6 years. Pt reports history of radiation therapy to the abdomen area resulting in atrophy in the paraspinal muscles. Pt describes it as an achy feeling. Usually after she has been bending, lifting, or walking a lot. Pt reports after repetitive activities, she starts to feel hunched over and has a hard time standing straight up. Pt also complains of a stiff neck. Pt reports she feels like maybe that occurred after 3 rounds of therapy at [facility].. Pt reports pain is better with ibuprofen, heat and lying flat on her back. Pt's goal for therapy is to \"strengthen core\".     Pain Ratin  Pain rating at best: 0  Pain rating at worst: 7  Pain description: aching and numbness    Functional limitations are described as occurring with:   bending  lifting  repetitive movements  walking > 2 blocks     Patient reports benefit from:  anti-inflammatory, heat, lying flat         Objective:      Note: Items left blank indicates the item was not performed or not indicated at the time of the evaluation.    Patient Outcome Measures :    Modified Oswestry Low Back Pain Disablity Questionnaire  in %: 12   Scores range from 0-100%, where a score of 0% " represents minimal pain and maximal function. The minimal clinically important difference is a score reduction of 12%.    Examination  1. Chronic bilateral thoracic back pain     2. Abnormal posture     3. Muscle weakness (generalized)       Involved side: Bilateral  Posture Observation:      General sitting posture is  poor.  General standing posture is poor.  Cervical:  Moderate forward head  Shoulder/Thoracic complex: Moderate bilateral scapular protraction   Moderate bilateral scapular winging  Moderately increased CT junction thoracic kyphosis  Moderately increased upper thoracic kyphosis  Lumbopelvic complex: Mild scoliosis  Moderately decreased lumbar lordosis    Lumbar ROM:  *no pain throughout AROM  Date: 3/7/2017     *Indicate scale AROM AROM AROM   Lumbar Flexion Nil restrict     Lumbar Extension Nil restrict      Right Left Right Left Right Left   Lumbar Sidebending Nil restrict Nil restrict       Lumbar Rotation Nil restrict Nil restrict       Thoracic Flexion Nil restrict     Thoracic Extension Max restrict     Thoracic Sidebending Min restrict Min restrict       Thoracic Rotation Mod restrict Mod restrict         Lower Extremity Strength:     Date: 3/7/2017     LE strength/5 Right Left Right Left Right Left   Hip Flexion (L1-3) 5 5       Hip Extension (L5-S1)         Hip Abduction (L4-5)         Hip Adduction (L2-3)         Hip External Rotation         Hip Internal Rotation         Knee Extension (L3-4) 5 5       Knee Flexion         Ankle Dorsiflexion (L4-5) 5 5       Great Toe Extension (L5)         Ankle Plantar flexion (S1)         Abdominals Good/fair TAC       Sensation    Not tested        Reflex Testing  Lumbar Dermatomes Right Left UE Reflexes Right Left   Iliac Crest and Groin (L1)   Biceps (C5-6)     Anterior Medial Thigh (L2)   Brachioradialis (C5-6)     Anterior Thigh, Medial Epicondyle Femur (L3)   Triceps (C7-8)     Lateral Thigh, Anterior Knee, Medial Leg/Malleolus (L4)   Kunal macdonald  test     Lateral Leg, Dorsal Foot (L5)   LE Reflexes     Lateral Foot (S1)   Patellar (L3-4)     Posterior Leg (S2)   Achilles (S1-2)     Other:   Babinski Response       Palpation: No TTP thoracic or lumbar paraspinals or along the spine     Lumbar Special Tests:     Lumbar Special Tests Right Left SI Tests Right  Left   Quadrant test   SI Compression     Straight leg raise - - SI Distraction     Crossover response   POSH Test     Slump - - Sacral Thrust     Sit-up test  FADIR - -   Trunk extensor endurance test  Resisted Abduction     Prone instability test  Other:     Pubic shotgun  Other:         Passive Mobility - Joint Integrity:  Hypomobile    LE Screen:  (B) hip AROM WNL no pain throughout     Treatment Today     TREATMENT MINUTES COMMENTS   Evaluation 31    Self-care/ Home management     Manual therapy     Neuromuscular Re-education     Therapeutic Activity     Therapeutic Exercises 24 Educated patient about PT diagnosis, prognosis, POC, and HEP; see exercise flow sheet for HEP   Gait training     Modality__________________                Total 55    Blank areas are intentional and mean the treatment did not include these items.     PT Evaluation Code: (Please list factors)  Patient History/Comorbidities: History of ovarian cancer with radiation treatment   Examination: Low back and thoracic spine   Clinical Presentation: Stable   Clinical Decision Making: Low     Patient History/  Comorbidities Examination  (body structures and functions, activity limitations, and/or participation restrictions) Clinical Presentation Clinical Decision Making (Complexity)   No documented Comorbidities or personal factors 1-2 Elements Stable and/or uncomplicated Low   1-2 documented comorbidities or personal factor 3 Elements Evolving clinical presentation with changing characteristics Moderate   3-4 documented comorbidities or personal factors 4 or more Unstable and unpredictable High                Dorota M  Chace  3/7/2017  3:00 PM

## 2021-06-09 NOTE — PROGRESS NOTES
[facility] Daily Progress     Patient Name: Raj Kim  Date: 3/13/2017  Visit #: 2  PTA visit #:  0  Referral Diagnosis: Thoracic spine pain, lumbar spine pain  Referring provider: Luc Thao DO  Visit Diagnosis:     ICD-10-CM    1. Chronic bilateral thoracic back pain M54.6     G89.29    2. Abnormal posture R29.3    3. Muscle weakness (generalized) M62.81          Assessment:     HEP/POC compliance is  good .  Response to Intervention Good tolerance to mobilization to thoracic spine. Pt continues to demonstrate significant thoracic kyphosis though. Will assess how kinesiotape help with posture in between sessions   Patient is benefitting from skilled physical therapy and is making steady progress toward functional goals.  Patient is appropriate to continue with skilled physical therapy intervention, as indicated by initial plan of care.    Goal Status:  Pt. will demonstrate/verbalize independence in self-management of condition in : 6 weeks  Pt. will be independent with home exercise program in : 6 weeks  Pt. will improve posture : and demonstrate posture with minimal to no cuing;and maintain posture for;10 minutes;in sitting;in standing;in walking;for working;for walking;in 6 weeks  Pt. will be able to walk : 6 blocks;with less pain;with less difficulty;for household mobility;for community mobility;for exercise/recreation;in 6 weeks  No Data Recorded    Plan / Patient Education:     Continue with initial plan of care.  Progress with home program as tolerated.  Assess Ktape, add lateral wall walks and quadruped exercises. Also add supine core exercises     Subjective:     Pain Ratin  Pt reports no pain. She is doing the exercises daily. Feels like the one with the bad is too easy.       Objective:   Added prone Y's and T's to HEP   Good tolerance to mobilizations to thoracic spine. Significant hypomobility of T7-T9        Treatment Today     TREATMENT MINUTES COMMENTS   Evaluation      Self-care/ Home management     Manual therapy 6  Grade II>III PA mobilizations to mid thoracic spine    Neuromuscular Re-education 8 KTape in an X-pattern across upper back starting at tips of shoulders to inferior aspect of scapula for posture    Therapeutic Activity     Therapeutic Exercises 15 See exercise flow sheet   Gait training     Modality__________________                Total 29    Blank areas are intentional and mean the treatment did not include these items.       Dorota Mas  3/13/2017

## 2021-06-09 NOTE — PROGRESS NOTES
[facility] Daily Progress     Patient Name: Raj Kim  Date: 3/17/2017  Visit #: 3  PTA visit #:  0  Referral Diagnosis: Thoracic spine pain, lumbar spine pain  Referring provider: Luc Thao DO  Visit Diagnosis:     ICD-10-CM    1. Chronic bilateral thoracic back pain M54.6     G89.29    2. Abnormal posture R29.3    3. Muscle weakness (generalized) M62.81          Assessment:     HEP/POC compliance is  good .  Response to Intervention Significant kyphosis of mid-thoracic spine, but patient tolerated joint mobilizations well.  Patient is benefitting from skilled physical therapy and is making steady progress toward functional goals.  Patient is appropriate to continue with skilled physical therapy intervention, as indicated by initial plan of care.    Goal Status:  Pt. will demonstrate/verbalize independence in self-management of condition in : 6 weeks  Pt. will be independent with home exercise program in : 6 weeks  Pt. will improve posture : and demonstrate posture with minimal to no cuing;and maintain posture for;10 minutes;in sitting;in standing;in walking;for working;for walking;in 6 weeks  Pt. will be able to walk : 6 blocks;with less pain;with less difficulty;for household mobility;for community mobility;for exercise/recreation;in 6 weeks  No Data Recorded    Plan / Patient Education:     Continue with initial plan of care.  Progress with home program as tolerated.  Add dead bug NV    Subjective:     Pain Ratin  Pt reports she was achy yesterday. Thinks its probably because it was the end of 3 long shifts. Pt reports she doesn't really feel like the tape did much and it started to itch by Wednesday so she took it off. Pt reports she is signed up to take a yoga class.      Objective:   Added lateral wall walks, bird dog, and prone Y's and T's on SB to HEP       Treatment Today     TREATMENT MINUTES COMMENTS   Evaluation     Self-care/ Home management     Manual therapy 6  Grade II>III  PA mobilizations to mid thoracic spine    Neuromuscular Re-education     Therapeutic Activity     Therapeutic Exercises 24 See exercise flow sheet   Gait training     Modality__________________                Total 30    Blank areas are intentional and mean the treatment did not include these items.       Dorota Mas  3/17/2017

## 2021-06-09 NOTE — PROGRESS NOTES
[facility] Daily Progress     Patient Name: Raj Kim  Date: 4/3/2017  Visit #: 7  PTA visit #:  0  Referral Diagnosis: Thoracic spine pain, lumbar spine pain  Referring provider: Luc Thao DO  Visit Diagnosis:     ICD-10-CM    1. Chronic bilateral thoracic back pain M54.6     G89.29    2. Abnormal posture R29.3    3. Muscle weakness (generalized) M62.81          Assessment:     HEP/POC compliance is  good .  Response to Intervention Patient demonstrates mild improvements in thoracic ROM and decreased stiffness of the thoracic spine. Patient continues to demonstrates abnormal posture, expecially during ambulation, due to poor core and scapular stabilization however patient is independent with her HEP to address stregnthening these muscles   Patient is benefitting from skilled physical therapy and is making steady progress toward functional goals.  Patient is appropriate to continue with skilled physical therapy intervention, as indicated by initial plan of care.     Goal Status:  Pt. will demonstrate/verbalize independence in self-management of condition in : 6 weeks;Met in this session  Pt. will be independent with home exercise program in : 6 weeks;Met in this session  Pt. will improve posture : and demonstrate posture with minimal to no cuing;and maintain posture for;10 minutes;in sitting;in standing;in walking;for working;for walking;in 6 weeks;Progressing toward  Pt. will be able to walk : 6 blocks;with less pain;with less difficulty;for household mobility;for community mobility;for exercise/recreation;in 6 weeks;Progressing toward  No Data Recorded    Plan / Patient Education:     The patient has made good progress towards her goals and demonstrates understaing of progression to next steps. She will continue with her HEP independently and I will keep her chart open for 30 days. If she has not returned in 30 days, she will be discharged to her HEP     Subjective:     Pain Ratin  Pt  reports she does feel more conscientious about her posture and feels like it is easier to hold her posture in sitting and standing, but still finding it very challenging to maintain an upright posture while walking.         Objective:     Lumbar ROM:  *no pain throughout AROM  Date: 3/7/2017 4/3/2017    *Indicate scale AROM AROM AROM   Lumbar Flexion Nil restrict Nil restrict    Lumbar Extension Nil restrict Nil restrict     Right Left Right Left Right Left   Lumbar Sidebending Nil restrict Nil restrict Nil restrict Nil restrict     Lumbar Rotation Nil restrict Nil restrict Nil restrict Nil restrict     Thoracic Flexion Nil restrict Nil restrtict    Thoracic Extension Max restrict Mod restrict    Thoracic Sidebending Min restrict Min restrict Min restrict Min restrict     Thoracic Rotation Mod restrict Mod restrict Min restrict Min restrict         Moderately increased upper and mid-thoracic kyphosis, more pronounced in standing   Moderate hypomobility of thoracic spine at T7-T9 (mild improvements from initial evaluation)     Treatment Today     TREATMENT MINUTES COMMENTS   Evaluation     Self-care/ Home management     Manual therapy 5 -Grade II>III PA mobilizations to T7-9   Neuromuscular Re-education     Therapeutic Activity     Therapeutic Exercises 20 See exercise flow sheet; ROM reassessment    Gait training     Modality__________________                Total 25    Blank areas are intentional and mean the treatment did not include these items.       Dorota Mas  4/3/2017

## 2021-06-10 NOTE — PATIENT INSTRUCTIONS - HE
1. Follow up one year,     2. No changes in medications.       Advance Directive  Patient s advance directive was discussed and I am comfortable with the patient s wishes.  Patient Education   Personalized Prevention Plan  You are due for the preventive services outlined below.  Your care team is available to assist you in scheduling these services.  If you have already completed any of these items, please share that information with your care team to update in your medical record.  Health Maintenance   Topic Date Due     HEPATITIS C SCREENING  1950     TD 18+ HE  02/12/2020     PNEUMOCOCCAL IMMUNIZATION 65+ LOW/MEDIUM RISK (2 of 2 - PPSV23) 05/01/2020     INFLUENZA VACCINE RULE BASED (1) 08/01/2020     DXA SCAN  05/13/2021     MEDICARE ANNUAL WELLNESS VISIT  07/27/2021     FALL RISK ASSESSMENT  07/27/2021     MAMMOGRAM  06/23/2022     ADVANCE CARE PLANNING  05/02/2024     LIPID  07/03/2024     COLORECTAL CANCER SCREENING  05/30/2028     ZOSTER VACCINES  Completed     HEPATITIS B VACCINES  Aged Out

## 2021-06-10 NOTE — PROGRESS NOTES
[facility] Daily Progress/Discharge Summary     Patient Name: Raj Kim  Date: 2017  Visit #: 8  PTA visit #:  0  Referral Diagnosis: Thoracic spine pain, lumbar spine pain  Referring provider: Luc Thao DO  Visit Diagnosis:     ICD-10-CM    1. Chronic bilateral thoracic back pain M54.6     G89.29    2. Abnormal posture R29.3    3. Muscle weakness (generalized) M62.81          Assessment:     HEP/POC compliance is  fair .  Response to Intervention Patient continues to demonstrates abnormal posture, expecially during ambulation, due to poor core and scapular stabilization however patient is independent with her HEP to address stregnthening these muscles   Patient is benefitting from skilled physical therapy and is making steady progress toward functional goals.  Patient is appropriate to continue with skilled physical therapy intervention, as indicated by initial plan of care.     Goal Status:  Pt. will demonstrate/verbalize independence in self-management of condition in : 6 weeks;Met in this session  Pt. will be independent with home exercise program in : 6 weeks;Met in this session  Pt. will improve posture : and demonstrate posture with minimal to no cuing;and maintain posture for;10 minutes;in sitting;in standing;in walking;for working;for walking;in 6 weeks;Progressing toward  Pt. will be able to walk : 6 blocks;with less pain;with less difficulty;for household mobility;for community mobility;for exercise/recreation;in 6 weeks;Progressing toward  No Data Recorded    Plan / Patient Education:     The patient has made good progress towards her goals and demonstrates understaing of progression to next steps. She will continue with her HEP independently.    Subjective:     Pain Ratin  Pt reports overall the last month has been going pretty well. Was noticing some improvement in her posture when she was doing the exercises regularly. Pt reports she recently went on vacation for 2 weeks  and wasn't doing the exercises as regularly and can tell the difference. Pt denies any pain but does report soreness and achiness in the back by the end of the day most likely due to fatigue. Pt states she knows what she needs to be doing in order to see improvements in her posture and stability, however she just does not always have the time.      Objective:   Patient able to perform HEP with minimal cues required       Treatment Today     TREATMENT MINUTES COMMENTS   Evaluation     Self-care/ Home management     Manual therapy     Neuromuscular Re-education     Therapeutic Activity     Therapeutic Exercises 20 Reviewed HEP and use of foam roller for cervical and thoracic mobility    Gait training     Modality__________________                Total 20    Blank areas are intentional and mean the treatment did not include these items.       Dorota Mas  5/8/2017

## 2021-06-10 NOTE — PROGRESS NOTES
Assessment and Plan:     1. Encounter for immunization  given  - Pneumococcal polysaccharide vaccine 23-valent 1 yo or older, subq/IM  - Td, Preservative Free (green label)    2. Incontinence of feces, unspecified fecal incontinence type  Related to past pelvic radiation, not new, no bleeding.     3. Lactose intolerance  Tested positive.  We discussed that this can vary based on dose dependence.     4. Benign polyp of large intestine  Colonoscopy 2018, small adenomatous polyp, due 2023.     5. Vitamin B12 deficiency (non anemic)  Will check level, takes SL replacement.     6. Peripheral Neuropathy  Related to radiation, mainly LLE    7. Hormone replacement therapy  Ongoing, is s/p hysterectomy     8. Weight loss  She is down perhaps 10 pounds, unclear why.  Is active and overall feels well otherwise.  Will follow.     9. Osteopenia  Last DXA 5/2019,  Does take calcium with vitamin D and does do weight bearing exercise.  Repeat DXA in one year.  Estrogen therapy likely helping.     10. 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  Seems stable and in no need for additional treatment.  She works on posture.     11. Personal history of ovarian cancer  This was found on laparoscopic evaluation years ago during evaluation of infertility.  Apparently was localized and was aggressively treated.  She has had no evidence of recurrence.      The patient's current medical problems were reviewed.    I have had an Advance Directives discussion with the patient.  The following health maintenance schedule was reviewed with the patient and provided in printed form in the after visit summary:   Health Maintenance   Topic Date Due     HEPATITIS C SCREENING  1950     INFLUENZA VACCINE RULE BASED (1) 08/01/2020     DXA SCAN  05/13/2021     MEDICARE ANNUAL WELLNESS VISIT  07/27/2021     FALL RISK ASSESSMENT  07/27/2021     MAMMOGRAM   06/23/2022     ADVANCE CARE PLANNING  05/02/2024     LIPID  07/03/2024     COLORECTAL CANCER SCREENING  05/30/2028     TD 18+ HE  07/27/2030     PNEUMOCOCCAL IMMUNIZATION 65+ LOW/MEDIUM RISK  Completed     ZOSTER VACCINES  Completed     HEPATITIS B VACCINES  Aged Out      Subjective:   Chief Complaint: Raj Kim is an 70 y.o. female here for an Annual Wellness visit.     HPI:  She feels OK overall. Is active and works on maintaining her posture, and keeping her low back flexible.  No unusual dyspnea, or any recent illness, or cough or fever.  Has chronic, mild, intermittent stool incontinence and stress urinary incontinence, not new.  Chronic back pain persists, without radiculopathy.  No symptoms of bleeding.        Review of Systems:   Please see above.  The rest of the review of systems are negative for all systems.    Patient Care Team:  Lorenzo Dunn MD as PCP - General (Internal Medicine)    Patient Active Problem List   Diagnosis     Osteoarthritis Of Multiple Sites     Vitamin B12 deficiency (non anemic)     Hyperlipidemia     Peripheral Neuropathy     Papillary Carcinoma Of The Ovary     Radiation Therapy     Varicose veins     Acquired lymphedema of leg     Personal history of ovarian cancer     Venous stasis     Lactose intolerance     RBBB     Benign polyp of large intestine     Lyme disease     Kyphosis (acquired) (postural)     Sensorineural hearing loss, bilateral     Fecal incontinence     Hormone replacement therapy     Past Medical History:   Diagnosis Date     Fecal incontinence 7/27/2020     Hormone replacement therapy      Hx of radiation therapy      Osteopenia of multiple sites 5/2/2019     Ovarian cancer (H) 1980     Peripheral neuropathy, secondary to drugs or chemicals      Radiation colitis      Sensorineural hearing loss, bilateral 5/2/2019     Vitamin B 12 deficiency       Past Surgical History:   Procedure Laterality Date     EXPLORATORY LAPAROTOMY      ovary/omentum  abnormalities     FINGER SURGERY Right 2016    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/   Social Needs     Financial resource strain: Not on file     Food insecurity     Worry: Not on file     Inability: Not on file     Transportation needs     Medical: Not on file     Non-medical: Not on file   Tobacco Use     Smoking status: Former Smoker     Smokeless tobacco: Never Used   Substance and Sexual Activity     Alcohol use: Yes     Comment: occasional     Drug use: No     Sexual activity: Not Currently   Lifestyle     Physical activity     Days per week: Not on file     Minutes per session: Not on file     Stress: Not on file   Relationships     Social connections     Talks on phone: Not on file     Gets together: Not on file     Attends Voodoo service: Not on file     Active member of club or organization: Not on file     Attends meetings of clubs or organizations: Not on file     Relationship status: Not on file     Intimate partner violence     Fear of current or ex partner: Not on file     Emotionally abused: Not on file     Physically abused: Not on file     Forced sexual activity: Not on file   Other Topics Concern     Not on file   Social History Narrative    . One adopted son Ginette cheek in [place] (age 35 in 2018). 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     avid bird watcher/hobbyist...(nick Whitley/isaiah Gutierrez);  "sister-Oneida Roberts.-primary contact      Current Outpatient Medications   Medication Sig Dispense Refill     betamethasone dipropionate (DIPROLENE) 0.05 % ointment Apply topically as needed.        calcium citrate-vitamin D3 (CITRACAL + D) 315-250 mg-unit per tablet Take 1 tablet by mouth daily.       cholecalciferol, vitamin D3, (VITAMIN D3) 2,000 unit capsule Take 5,000 Units by mouth daily.              cyanocobalamin, vitamin B-12, 1,000 mcg Subl Place 1,000 mcg under the tongue daily.       desonide (DESOWEN) 0.05 % cream Apply topically as needed.        estradioL (ESTRACE) 0.5 MG tablet TAKE 1 TABLET BY MOUTH DAILY. 90 tablet 3     gabapentin (NEURONTIN) 100 MG capsule Take 200 mg by mouth 2 times a day at 6:00 am and 4:00 pm.        Lactobacillus rhamnosus GG (CULTURELLE) 10-15 Billion cell capsule Take 1 capsule by mouth daily.       multivitamin with minerals (THERA-M) 9 mg iron-400 mcg Tab tablet Take 1 tablet by mouth daily.       OMEGA-3/DHA/EPA/FISH OIL (FISH OIL-OMEGA-3 FATTY ACIDS) 300-1,000 mg capsule Take 2 g by mouth 2 (two) times a day.       psyllium with dextrose (METAMUCIL JAR) powder Take by mouth 3 (three) times a day. 1 tsp daily       valACYclovir (VALTREX) 500 MG tablet Take 500 mg by mouth as needed.        Objective:   Vital Signs:   Visit Vitals  /68 (Patient Site: Right Arm, Patient Position: Sitting, Cuff Size: Adult Large)   Pulse 82   Ht 5' 5\" (1.651 m)   Wt 133 lb (60.3 kg)   LMP 02/04/1980   SpO2 98%   BMI 22.13 kg/m       PHYSICAL EXAM:  General Appearance: In no acute distress  /68 (Patient Site: Right Arm, Patient Position: Sitting, Cuff Size: Adult Large)   Pulse 82   Ht 5' 5\" (1.651 m)   Wt 133 lb (60.3 kg)   LMP 02/04/1980   SpO2 98%   BMI 22.13 kg/m    EYES: Clear  HEENT: nose and throat clear, ears normal   NECK:  without cervical or axillary adenopathy  RESPIRATORY: Clear to auscultation  CARDIOVASCULAR: S1, S2  ABDOMEN: soft, flat, and non-tender, " without mass, rebound, or guarding  EXTREMITIES: No joint swelling, no ulcer or edema  NEUROLOGIC: No arm or leg  weakness, speech is clear, gait is normal  PSYCHIATRIC: Oriented X 3, without confusion, behavior and affect normal, thinking is clear    Assessment Results 7/27/2020   Activities of Daily Living No help needed   Instrumental Activities of Daily Living No help needed   Get Up and Go Score Less than 12 seconds   Mini Cog Total Score 5   Some recent data might be hidden     A Mini-Cog score of 0-2 suggests the possibility of dementia, score of 3-5 suggests no dementia    Identified Health Risks:     Patient's advanced directive was discussed and I am comfortable with the patient's wishes.

## 2021-06-11 NOTE — PROGRESS NOTES
Raj BRADY Julio  1950    Pre-op:  Date of Surgery: 8/01/2017  Procedure: Left Thumb repair  Surgeon: Melissa Vargas  Place: [facility]  Fax #: [phone]    Assessment and Plan:  1. Stable for planned procedure; left thumb repair  2. S/p right thumb repair 5/2016 without problems  3. Labs posted/ekg from 2016; no interval change      Chief Complaint: pre-op     Visit diagnoses:    1. Preop exam for internal medicine  Electrocardiogram Perform and Read   2. Primary osteoarthritis of first carpometacarpal joint of left hand     3. Kyphosis (acquired) (postural)     4. RBBB       Patient Active Problem List   Diagnosis     Osteoarthritis Of Multiple Sites     Vitamin B12 Deficiency     Hyperlipidemia     Peripheral Neuropathy     Papillary Carcinoma Of The Ovary     Radiation Therapy     Varicose veins     Acquired lymphedema of leg     Personal history of ovarian cancer     Venous stasis     Lactose intolerance     RBBB     Benign polyp of large intestine     CMC arthritis, thumb, degenerative     Lyme disease     Osteoporosis     Kyphosis (acquired) (postural)     Past Medical History:   Diagnosis Date     Ovarian cancer 1980     Peripheral neuropathy, secondary to drugs or chemicals      Vitamin B 12 deficiency      Past Surgical History:   Procedure Laterality Date     HYSTERECTOMY  1980     OOPHORECTOMY Bilateral 1980     NE EXPLORATORY OF ABDOMEN      Description: Exploratory Laparotomy;  Recorded: 02/08/2008;     NE LAP,CHOLECYSTECTOMY      Description: Cholecystectomy Laparoscopic;  Proc Date: 08/17/2012;  Comments: Performed at [facility] by Dr. Gonsales     NE TOTAL ABDOM HYSTERECTOMY      Description: Hysterectomy;  Recorded: 02/08/2008;     right thumb repair Right 2016    Dr. [facility]     Social History     Social History     Marital status:      Spouse name: N/A     Number of children: N/A     Years of education: N/A     Occupational History     Not on file.      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                   Family History   Problem Relation Age of Onset     Colon cancer Maternal Aunt      Diabetes Father      Meds:  Current Outpatient Prescriptions   Medication Sig Dispense Refill     betamethasone dipropionate (DIPROLENE) 0.05 % ointment Apply topically as needed.        calcium citrate-vitamin D3 (CITRACAL + D) 315-250 mg-unit per tablet Take 1 tablet by mouth daily.       cholecalciferol, vitamin D3, (VITAMIN D3) 2,000 unit cap Take 5,000 Units by mouth daily.        desonide (DESOWEN) 0.05 % cream Apply topically as needed.        estradiol (ESTRACE) 0.5 MG tablet TAKE 1 TABLET DAILY. 90 tablet 3     FLAXSEED OIL ORAL Take by mouth.       gabapentin (NEURONTIN) 100 MG capsule Take 200 mg by mouth 2 times a day at 6:00 am and 4:00 pm.        Lactobacillus rhamnosus GG (CULTURELLE) 10-15 Billion cell capsule Take 1 capsule by mouth daily.       multivitamin with minerals (THERA-M) 9 mg iron-400 mcg Tab tablet Take 1 tablet by mouth daily.       OMEGA-3/DHA/EPA/FISH OIL (FISH OIL-OMEGA-3 FATTY ACIDS) 300-1,000 mg capsule Take 2 g by mouth 2 (two) times a day.       RED YEAST RICE ORAL Take by mouth.       valACYclovir (VALTREX) 500 MG tablet Take 500 mg by mouth as needed.        meloxicam (MOBIC) 7.5 MG tablet Take 1 tablet (7.5 mg total) by mouth daily as needed for pain. 30 tablet 2     Current Facility-Administered Medications   Medication Dose Route Frequency Provider Last Rate Last Dose     cyanocobalamin injection 1,000 mcg  1,000 mcg Intramuscular Q30 Days Lorenzo Pelletier MD   1,000 mcg at 07/17/17 4307       Allergies   Allergen Reactions     Celecoxib  Headache     Cephalosporins Hives     Hydrocodone-Acetaminophen Headache     Penicillins Hives       ROS: complete review of symptoms otherwise negative except as noted below    S:  Chronic left thumb pain. Sees ortho. Hx right thumb repair with good results. Feels fine otherwise. Rough summer last year with Lymes but she has recovered. Feels healthy other than chronic thumb problems       O:   Vitals:    07/17/17 1336   BP: 128/74   Patient Site: Left Arm   Patient Position: Sitting   Cuff Size: Adult Regular   Pulse: 78   Weight: 140 lb 6.4 oz (63.7 kg)       Physical Exam:  General-  VS- see above  HEENT- neg   Neck- no adenopathy/thyromegaly/bruits  Chest- clear   Cor- reg no murmurs/gallops/ectopics  Abdomen- soft non tender, no masses; no organomegaly  Extremities: no edema, good distal pulses; tender 1st MCP,   Neuro- Cr. NN-  intact, alert,           Lorenzo Pelletier MD    Lab Results   Component Value Date    WBC 7.0 04/03/2017    HGB 13.8 04/03/2017    HCT 41.0 04/03/2017    MCV 87 04/03/2017     04/03/2017     Results for orders placed or performed in visit on 04/03/17   Comprehensive Metabolic Panel   Result Value Ref Range    Sodium 141 136 - 145 mmol/L    Potassium 4.4 3.5 - 5.0 mmol/L    Chloride 104 98 - 107 mmol/L    CO2 28 22 - 31 mmol/L    Anion Gap, Calculation 9 5 - 18 mmol/L    Glucose 97 70 - 125 mg/dL    BUN 13 8 - 22 mg/dL    Creatinine 0.76 0.60 - 1.10 mg/dL    GFR MDRD Af Amer >60 >60 mL/min/1.73m2    GFR MDRD Non Af Amer >60 >60 mL/min/1.73m2    Bilirubin, Total 0.6 0.0 - 1.0 mg/dL    Calcium 10.1 8.5 - 10.5 mg/dL    Protein, Total 6.6 6.0 - 8.0 g/dL    Albumin 3.6 3.5 - 5.0 g/dL    Alkaline Phosphatase 61 45 - 120 U/L    AST 26 0 - 40 U/L    ALT 34 0 - 45 U/L         23-MAY-2016 09:50:34 HE JOES/JOHNS/DIDIER/KT  Normal sinus rhythm  Right bundle branch block  Abnormal ECG  When compared with ECG of 17-AUG-2012 08:05,  No significant change was found  Confirmed by CAIN NICOLE MD  LOC:LEONEL ([id number]) on 5/24/2016 2:53:47 PM

## 2021-06-11 NOTE — PROGRESS NOTES
Assessment/Plan:      Diagnoses and all orders for this visit:    Cervicalgia  -     meloxicam (MOBIC) 7.5 MG tablet; Take 1 tablet (7.5 mg total) by mouth daily as needed for pain.  Dispense: 30 tablet; Refill: 2    Myofascial muscle pain  -     cyclobenzaprine (FLEXERIL) 5 MG tablet; Take 1 tablet (5 mg total) by mouth 3 (three) times a day as needed for muscle spasms.  Dispense: 30 tablet; Refill: 0  -     meloxicam (MOBIC) 7.5 MG tablet; Take 1 tablet (7.5 mg total) by mouth daily as needed for pain.  Dispense: 30 tablet; Refill: 2    Somatic dysfunction of cervical region    Somatic dysfunction of rib region    Somatic dysfunction of head region    Somatic dysfunction of upper extremity    Somatic dysfunction of thoracic region        Assessment:    1.  Cervical spine pain at the cervicothoracic junction most consistent with facet arthropathy and myofascial pain.  She has some underlying degenerative disc disease in the mid cervical spine.  2.  Somatic dysfunctions of the cranium, cervical spine, rib cage, upper extremities, thoracic spine  that contribute to the patient's pain complaints.    3.  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    Discussion:    1.  We discussed the diagnosis and treatment options.  We discussed the option of therapy, manual medicine, medications.  2.  Trial cyclobenzaprine at bedtime for myofascial pain.  3.  Trial meloxicam for generalized arthralgias and pain.  4.  Osteopathic manipulative medicine today.  Patient agrees to proceed.  Please see attached procedure note.  5.  Follow-up as needed if symptoms do not improve      It was our pleasure caring for your patient today, if there any questions or concerns please do not hesitate to contact us.      Subjective:   Patient ID: Raj Kim is a 66 y.o. female.    History of Present Illness: Patient presents for evaluation of cervical spine  pain.  This is a new pain for her.  Started 2 weeks ago.  No trauma.  Subtle left cervical thoracic junction to the left of midline.  Burning pain constant.  3/10 today.  No trauma.  No numbness tingling or weakness in the extremities.  Unsure what makes it worse or makes it better.  She wanted this evaluated given her history.  She does have a burning type pain throughout the left neck and does radiate towards upper trapezius mildly but not out to the shoulder.  Has not had any treatment such as physical therapy.  She does have a history of lumbar spine pain with muscle atrophy related to radiation for pelvic cancer.  She has a sagittal plane imbalance.    Imaging: Plain films of the cervical spine as part of the scoliosis images were personally reviewed.  Some degenerative disc disease with anterior osteophytes at the mid cervical spine.  Normal alignment.    Review of Systems: No   weakness.  No bowel or bladder incontinence.  No headaches, dizziness, nausea, vomiting, blurred vision or balance deficits.    Past Medical History:   Diagnosis Date     Ovarian cancer 1980     Peripheral neuropathy, secondary to drugs or chemicals      Vitamin B 12 deficiency        The following portions of the patient's history were reviewed and updated as appropriate: allergies, current medications, past family history, past medical history, past social history, past surgical history and problem list.      Objective:   Physical Exam:    Vitals:    06/07/17 0853   BP: 130/62   Pulse: 88       General: Alert and oriented with normal affect. Attention, knowledge, memory, and language are intact. No acute distress.   Eyes: Sclerae are clear.  Respirations: Unlabored. CV: No lower extremity edema.   Gait:  Nonantalgic   Head forward posture.  Full flexion-extension.  Mild decreased rotation bilaterally with pain peer tenderness over the C7-T1 junction left greater than right over the posterior pillars and paraspinal muscles with  hypertonic tissue textures.  Some minimal tenderness over the upper trapezius muscles.  Sensation is intact to light touch throughout the upper  extremities.  Reflexes are 2+ and symmetric in the biceps triceps and brachioradialis with negative Hoffmans.      Manual muscle testing reveals:  Right /Left out of 5  5/5 shoulder abductors  5/5 elbow flexors  5/5 elbow extensors  5/5 wrist extensors  5/5 interosseus  5/5 finger flexors       Structural exam: Cranium: OA sidebent left rotated right cervical spine: C3 sidebent right rotated right, C5-6 rotated right sidebent right. Rib cage: Rib one elevated on the left. Thoracic spine: T1 rotated right sidebent right,   Upper Extremities: myofascial restrictions of the left greater than right upper trap, infraspinatus/parascapular muscles. bilateral acromioclavicular resrictions.  Procedure:    After discussing the risks and benefits of osteopathic manipulative medicine, verbal consent was obtained. The somatic dysfunctions listed above were treated with the following techniques: Cranium:mmuscle energy for the OA. Cervical spine: Muscle energy, still technique, FPR, myofascial release, BLT, and soft tissue techniques. Rib cage: Myofascial release and FPR. Thoracic spine: Myofascial release, BLT.  Upper Exrtremity: MFR, FPR, BLT.  The patient tolerated the procedure well and had improved range of motion in all areas treated prior to leaving the clinic.

## 2021-06-13 NOTE — TELEPHONE ENCOUNTER
Who is calling:  Patient   Reason for Call:  I heard the [place] clinic was opening up again.  Is Lorenzo Dunn MD still going to be working there? Provider was not on the provider moving clinics list and he is currently working downtown. Please return call to patient.   Date of last appointment with primary care: 7/27/2020  Okay to leave a detailed message: Yes

## 2021-06-16 PROBLEM — H90.3 SENSORINEURAL HEARING LOSS, BILATERAL: Status: ACTIVE | Noted: 2019-05-02

## 2021-06-16 PROBLEM — M40.00 KYPHOSIS (ACQUIRED) (POSTURAL): Status: ACTIVE | Noted: 2017-07-17

## 2021-06-16 NOTE — PROGRESS NOTES
HPI: This patient is a 69yo F who presents for evaluation of the throat at the request of Dr. Dunn. There has been a globus sensation for a few years. Feels like small, dry things like jelly beans and breads tend to need a second swallow to go down. No aspiration or regurgitation. Denies fevers, otalgia, weight loss, odynophagia, dysphagia, hemoptysis, and shortness of breath. Does not complain of sour taste, heartburn, or burping. Is not taking reflux medication. Also complains of tonsil stones. She has been seen for these before and a tonsillectomy was offered. She has not tried routine gargles. 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    Past medical history, surgical history, social history, family history, medications, and allergies have been reviewed with the patient and are documented above.    Review of Systems: a 10-system review was performed. Pertinent positives are noted in the HPI and on a separate scanned document in the chart.    PHYSICAL EXAMINATION:  GEN: no acute distress, normocephalic  EYES: extraocular movements are intact, pupils are equal and round. Sclera clear.   EARS: auricles are normally formed. The external auditory canals are clear with minimal to no cerumen. Tympanic membranes are intact bilaterally with no signs of infection, effusion, retractions, or perforations.  NOSE: anterior nares are patent. There are no masses or lesions. The septum is non-obstructing. Dry on the right mid septum.  OC/OP: clear, dentition is in good repair. The tongue and palate are fully mobile and symmetric. No masses or lesions. Cobblestoning of the posterior pharyngeal wall. Tonsils 1+ with small tonsil stones noted R>L.  HP/L (scope): nasopharynx, base of tongue, vallecula, epiglottis, and pyriform sinuses are clear. The bilateral vocal folds are mobile and without lesion. There is interarytenoid pachydermia and some hyperemia of the laryngeal surface of the  epiglottis c/w LPR.  NECK: soft and supple. No lymphadenopathy or masses. Airway is midline.  NEURO: CN VII and XII symmetric. alert and oriented. No spontaneous nystagmus. Gait is normal.  PULM: breathing comfortably on room air, normal chest expansion with respiration  CARDS: no cyanosis or clubbing, normal carotid pulses    FLEXIBLE LARYNGOSCOPY: Scope inserted bilaterally to examine nasal tissue, nasopharynx, posterior oropharynx, hypopharynx, and larynx. See exam notes for exam finding details. Patient tolerated the procedure well.    MEDICAL DECISION-MAKING: This patient is a 71yo F with chronic laryngitis/globus sensation from acid reflux. Discussed diet and lifestyle changes in addition to risks and benefits of short-term OTC H2 blocker vs PPI therapy. For the tonsil stones, recommended routine saline gargles as first line management. Also recommending nasal saline spray for the intranasal dryness.

## 2021-06-16 NOTE — TELEPHONE ENCOUNTER
Reason for Call: Request for an order or referral:    Order or referral being requested: DEXA scan     Date needed: as soon as possible    Has the patient been seen by the PCP for this problem? YES    Additional comments: Pt states she is in need of a follow up Dexa scan and Dr Benites follow pt for osteoporosis      Phone number Patient can be reached at:    Cell number on file:    Telephone Information:   Mobile [phone]       Best Time:  anytime    Can we leave a detailed message on this number?  Yes    Call taken on 4/16/2021 at 11:07 AM by Elzbieta Chowdhury

## 2021-06-17 NOTE — PATIENT INSTRUCTIONS - HE
Patient Instructions by Lorenzo Dunn MD at 5/2/2019  9:05 AM     Author: Lorenzo Dunn MD Service: -- Author Type: Physician    Filed: 5/3/2019 10:53 AM Encounter Date: 5/2/2019 Status: Addendum    : Lorenzo Dunn MD (Physician)    Related Notes: Original Note by Lorenzo Dunn MD (Physician) filed at 5/2/2019  1:04 PM

## 2021-06-17 NOTE — PROGRESS NOTES
Assessment and Plan:   1 welcome to Medicare-no new diagnoses or problems functioning very well recently retired from [employer]   Nursing  2 routine labs ordered    Problem List Items Addressed This Visit     None      Visit Diagnoses     Vitamin B12 deficiency    -  Primary    Relevant Orders    Vitamin B12    Hyperlipemia        Relevant Orders    Comprehensive Metabolic Panel    Lipid Cascade    HM2(CBC w/o Differential) (Completed)    Thyroid Stimulating Hormone (TSH)    Vitamin D deficiency        Relevant Orders    Vitamin D, Total (25-Hydroxy)            The patient's current medical problems were reviewed.    I have had an Advance Directives discussion with the patient.  The following health maintenance schedule was reviewed with the patient and provided in printed form in the after visit summary:   Health Maintenance   Topic Date Due     PNEUMOCOCCAL POLYSACCHARIDE VACCINE AGE 65 AND OVER  2017     MAMMOGRAM  2019     DXA SCAN  2019     FALL RISK ASSESSMENT  2019     TD 18+ HE  2020     ADVANCE DIRECTIVES DISCUSSED WITH PATIENT  2021     COLONOSCOPY  2025     INFLUENZA VACCINE RULE BASED  Completed     TDAP ADULT ONE TIME DOSE  Completed     PNEUMOCOCCAL CONJUGATE VACCINE FOR ADULTS (PCV13 OR PREVNAR)  Completed     ZOSTER VACCINE  Completed        Subjective:   Chief Complaint: Raj Kim is an 67 y.o. female here for a Welcome to Medicare visit.   HPI: She comes in for welcome to Medicare visit she is actually doing quite well her sons doing better he is 35 years old now he has had some mental health issues and other stressors but now is doing fine.  Goes to [place] every winter for February and enjoys bird watching.  No symptoms or concerns    Review of Systems:  Please see above.  The rest of the review of systems are negative for all systems.    Patient Care Team:  Lorenzo Pelletier MD as PCP - General     Patient  Active Problem List   Diagnosis     Osteoarthritis Of Multiple Sites     Vitamin B12 Deficiency     Hyperlipidemia     Peripheral Neuropathy     Papillary Carcinoma Of The Ovary     Radiation Therapy     Varicose veins     Acquired lymphedema of leg     Personal history of ovarian cancer     Venous stasis     Lactose intolerance     RBBB     Benign polyp of large intestine     CMC arthritis, thumb, degenerative     Lyme disease     Osteoporosis     Kyphosis (acquired) (postural)     Past Medical History:   Diagnosis Date     Ovarian cancer      Peripheral neuropathy, secondary to drugs or chemicals      Vitamin B 12 deficiency       Past Surgical History:   Procedure Laterality Date     HYSTERECTOMY       OOPHORECTOMY Bilateral      ID EXPLORATORY OF ABDOMEN      Description: Exploratory Laparotomy;  Recorded: 2008;     ID LAP,CHOLECYSTECTOMY      Description: Cholecystectomy Laparoscopic;  Proc Date: 2012;  Comments: Performed at [facility] by Dr. Gonsales     ID TOTAL ABDOM HYSTERECTOMY      Description: Hysterectomy;  Recorded: 2008;     right thumb repair Right 2016    Dr. 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 One adopted son Surendra- lives in [place] (age 35 in 2018). 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     avid bird watcher/hobbyist...(nick Whitley/isaiah Gutierrez); sister-Oneida Roberts.-primary  "contact           Current Outpatient Prescriptions   Medication Sig Dispense Refill     betamethasone dipropionate (DIPROLENE) 0.05 % ointment Apply topically as needed.        calcium citrate-vitamin D3 (CITRACAL + D) 315-250 mg-unit per tablet Take 1 tablet by mouth daily.       cholecalciferol, vitamin D3, (VITAMIN D3) 2,000 unit cap Take 5,000 Units by mouth daily.        desonide (DESOWEN) 0.05 % cream Apply topically as needed.        estradiol (ESTRACE) 0.5 MG tablet Take 1 tablet (0.5 mg total) by mouth daily. 90 tablet 2     FLAXSEED OIL ORAL Take by mouth.       gabapentin (NEURONTIN) 100 MG capsule Take 200 mg by mouth 2 times a day at 6:00 am and 4:00 pm.        Lactobacillus rhamnosus GG (CULTURELLE) 10-15 Billion cell capsule Take 1 capsule by mouth daily.       multivitamin with minerals (THERA-M) 9 mg iron-400 mcg Tab tablet Take 1 tablet by mouth daily.       OMEGA-3/DHA/EPA/FISH OIL (FISH OIL-OMEGA-3 FATTY ACIDS) 300-1,000 mg capsule Take 2 g by mouth 2 (two) times a day.       RED YEAST RICE ORAL Take by mouth.       valACYclovir (VALTREX) 500 MG tablet Take 500 mg by mouth as needed.        meloxicam (MOBIC) 7.5 MG tablet Take 1 tablet (7.5 mg total) by mouth daily as needed for pain. 30 tablet 2     Current Facility-Administered Medications   Medication Dose Route Frequency Provider Last Rate Last Dose     cyanocobalamin injection 1,000 mcg  1,000 mcg Intramuscular Q30 Days Lorezno Pelletier MD   1,000 mcg at 12/19/17 1524      Objective:   Vital Signs:   Visit Vitals     /70 (Patient Site: Right Arm, Patient Position: Sitting, Cuff Size: Adult Regular)     Pulse 64     Temp 97.4  F (36.3  C) (Tympanic)     Ht 5' 5.35\" (1.66 m)     Wt 142 lb (64.4 kg)     LMP 02/04/1980     BMI 23.37 kg/m2        EKG:  na    VisionScreening:   Visual Acuity Screening    Right eye Left eye Both eyes   Without correction: 20/30 20/25/ 20/30   With correction:           PHYSICAL EXAM  Very pleasant woman " easy to smile generally healthy-appearing somewhat overweight  HEENT unremarkable  Lungs clear to auscultation  Breasts negative  Regular rhythm  Abdomen benign  Extremities good proximal distal pulses no edema  Neurologic unremarkable    Assessment Results 4/19/2018   Activities of Daily Living No help needed   Instrumental Activities of Daily Living No help needed   Get Up and Go Score Less than 12 seconds   Mini Cog Total Score 5   Some recent data might be hidden     A Mini Cog score of 0-2 suggests the possibility of dementia, score of 3-5 suggests no dementia    Identified Health Risks:     She is at risk for lack of exercise and has been provided with information to increase physical activity for the benefit of her well-being.  She is at risk for falling and has been provided with information to reduce the risk of falling at home.  Patient's advanced directive was discussed and I am comfortable with the patient's wishes.    Recent Results (from the past 240 hour(s))   HM2(CBC w/o Differential)   Result Value Ref Range    WBC 6.7 4.0 - 11.0 thou/uL    RBC 5.03 3.80 - 5.40 mill/uL    Hemoglobin 14.8 12.0 - 16.0 g/dL    Hematocrit 44.4 35.0 - 47.0 %    MCV 88 80 - 100 fL    MCH 29.3 27.0 - 34.0 pg    MCHC 33.3 32.0 - 36.0 g/dL    RDW 12.0 11.0 - 14.5 %    Platelets 286 140 - 440 thou/uL    MPV 8.1 7.0 - 10.0 fL

## 2021-06-18 ENCOUNTER — COMMUNICATION - HEALTHEAST (OUTPATIENT)
Dept: INTERNAL MEDICINE | Facility: CLINIC | Age: 71
End: 2021-06-18

## 2021-06-18 DIAGNOSIS — Z78.0 MENOPAUSE: ICD-10-CM

## 2021-06-18 RX ORDER — ESTRADIOL 0.5 MG/1
0.5 TABLET ORAL DAILY
Qty: 90 TABLET | Refills: 3 | Status: SHIPPED | OUTPATIENT
Start: 2021-06-18 | End: 2022-03-29

## 2021-06-19 NOTE — LETTER
Letter by Lorenzo Dunn MD at      Author: Lorenzo Dunn MD Service: -- Author Type: --    Filed:  Encounter Date: 5/3/2019 Status: (Other)         Raj Kim  [address] [place] [address]     May 3, 2019     Dear Ms. Kim,    Below are the results from your recent visit:    Resulted Orders   Comprehensive Metabolic Panel   Result Value Ref Range    Sodium 142 136 - 145 mmol/L    Potassium 4.3 3.5 - 5.0 mmol/L    Chloride 106 98 - 107 mmol/L    CO2 23 22 - 31 mmol/L    Anion Gap, Calculation 13 5 - 18 mmol/L    Glucose 82 70 - 125 mg/dL    BUN 15 8 - 22 mg/dL    Creatinine 0.77 0.60 - 1.10 mg/dL    GFR MDRD Af Amer >60 >60 mL/min/1.73m2    GFR MDRD Non Af Amer >60 >60 mL/min/1.73m2    Bilirubin, Total 0.5 0.0 - 1.0 mg/dL    Calcium 10.0 8.5 - 10.5 mg/dL    Protein, Total 6.5 6.0 - 8.0 g/dL    Albumin 3.6 3.5 - 5.0 g/dL    Alkaline Phosphatase 60 45 - 120 U/L    AST 23 0 - 40 U/L    ALT 29 0 - 45 U/L    Narrative    Fasting Glucose reference range is 70-99 mg/dL per  American Diabetes Association (ADA) guidelines.   HM1 (CBC with Diff)   Result Value Ref Range    WBC 12.3 (H) 4.0 - 11.0 thou/uL    RBC 4.54 3.80 - 5.40 mill/uL    Hemoglobin 13.5 12.0 - 16.0 g/dL    Hematocrit 40.5 35.0 - 47.0 %    MCV 89 80 - 100 fL    MCH 29.7 27.0 - 34.0 pg    MCHC 33.3 32.0 - 36.0 g/dL    RDW 11.8 11.0 - 14.5 %    Platelets 264 140 - 440 thou/uL    MPV 8.3 7.0 - 10.0 fL    Neutrophils % 80 (H) 50 - 70 %    Lymphocytes % 9 (L) 20 - 40 %    Monocytes % 8 2 - 10 %    Eosinophils % 3 0 - 6 %    Basophils % 1 0 - 2 %    Neutrophils Absolute 9.9 (H) 2.0 - 7.7 thou/uL    Lymphocytes Absolute 1.1 0.8 - 4.4 thou/uL    Monocytes Absolute 1.0 (H) 0.0 - 0.9 thou/uL    Eosinophils Absolute 0.3 0.0 - 0.4 thou/uL    Basophils Absolute 0.1 0.0 - 0.2 thou/uL       Your tests are good. The minor abnormality of the WBC - white blood count - is not significant.     Please call with questions or contact us using  MyChart.    Sincerely,        Electronically signed by Lorenzo Dunn MD

## 2021-06-21 ENCOUNTER — RECORDS - HEALTHEAST (OUTPATIENT)
Dept: ADMINISTRATIVE | Facility: OTHER | Age: 71
End: 2021-06-21

## 2021-06-26 NOTE — TELEPHONE ENCOUNTER
Reason for Call:  Medication or medication refill:    estradioL (ESTRACE) 0.5 MG tablet    Do you use a [facility]?  Name of the pharmacy and phone number for the current request: [facility] [place] [place] - [address]    Name of the medication requested: estradioL (ESTRACE) 0.5 MG tablet    Other request: Pt is calling because the mail order pharmacy has send 2 request for refill with no response. The patient is out of the medication and needs refilled send ASAP to the mail order.     Can we leave a detailed message on this number? No call back needed    Phone number patient can be reached at: Home number on file [phone] (home)    Best Time:     Call taken on 6/18/2021 at 8:25 AM by Sadie Yost

## 2021-06-28 ENCOUNTER — RECORDS - HEALTHEAST (OUTPATIENT)
Dept: MAMMOGRAPHY | Facility: CLINIC | Age: 71
End: 2021-06-28

## 2021-06-28 DIAGNOSIS — Z12.31 ENCOUNTER FOR SCREENING MAMMOGRAM FOR MALIGNANT NEOPLASM OF BREAST: ICD-10-CM

## 2021-07-10 ENCOUNTER — HEALTH MAINTENANCE LETTER (OUTPATIENT)
Age: 71
End: 2021-07-10

## 2021-07-13 ENCOUNTER — RECORDS - HEALTHEAST (OUTPATIENT)
Dept: ADMINISTRATIVE | Facility: CLINIC | Age: 71
End: 2021-07-13

## 2021-07-21 ENCOUNTER — RECORDS - HEALTHEAST (OUTPATIENT)
Dept: ADMINISTRATIVE | Facility: CLINIC | Age: 71
End: 2021-07-21

## 2021-07-28 ENCOUNTER — OFFICE VISIT (OUTPATIENT)
Dept: INTERNAL MEDICINE | Facility: CLINIC | Age: 71
End: 2021-07-28
Payer: COMMERCIAL

## 2021-07-28 VITALS
WEIGHT: 135.2 LBS | HEART RATE: 78 BPM | SYSTOLIC BLOOD PRESSURE: 110 MMHG | DIASTOLIC BLOOD PRESSURE: 80 MMHG | RESPIRATION RATE: 18 BRPM | OXYGEN SATURATION: 99 % | BODY MASS INDEX: 22.5 KG/M2 | TEMPERATURE: 98.1 F

## 2021-07-28 DIAGNOSIS — C56.9 MALIGNANT NEOPLASM OF OVARY, UNSPECIFIED LATERALITY (H): ICD-10-CM

## 2021-07-28 DIAGNOSIS — E53.8 VITAMIN B12 DEFICIENCY (NON ANEMIC): ICD-10-CM

## 2021-07-28 DIAGNOSIS — M40.00 KYPHOSIS (ACQUIRED) (POSTURAL): ICD-10-CM

## 2021-07-28 DIAGNOSIS — E78.5 HYPERLIPIDEMIA LDL GOAL <100: ICD-10-CM

## 2021-07-28 DIAGNOSIS — K63.5 BENIGN POLYP OF LARGE INTESTINE: ICD-10-CM

## 2021-07-28 DIAGNOSIS — I89.0 ACQUIRED LYMPHEDEMA OF LEG: ICD-10-CM

## 2021-07-28 DIAGNOSIS — E55.9 VITAMIN D DEFICIENCY: ICD-10-CM

## 2021-07-28 DIAGNOSIS — G60.9 HEREDITARY AND IDIOPATHIC PERIPHERAL NEUROPATHY: Primary | ICD-10-CM

## 2021-07-28 DIAGNOSIS — Z00.00 ENCOUNTER FOR MEDICARE ANNUAL WELLNESS EXAM: ICD-10-CM

## 2021-07-28 LAB
ALBUMIN SERPL-MCNC: 3.5 G/DL (ref 3.5–5)
ALP SERPL-CCNC: 59 U/L (ref 45–120)
ALT SERPL W P-5'-P-CCNC: 23 U/L (ref 0–45)
ANION GAP SERPL CALCULATED.3IONS-SCNC: 10 MMOL/L (ref 5–18)
AST SERPL W P-5'-P-CCNC: 25 U/L (ref 0–40)
BILIRUB SERPL-MCNC: 0.6 MG/DL (ref 0–1)
BUN SERPL-MCNC: 17 MG/DL (ref 8–22)
CALCIUM SERPL-MCNC: 9.9 MG/DL (ref 8.5–10.5)
CHLORIDE BLD-SCNC: 105 MMOL/L (ref 98–107)
CHOLEST SERPL-MCNC: 177 MG/DL
CO2 SERPL-SCNC: 25 MMOL/L (ref 22–31)
CREAT SERPL-MCNC: 0.79 MG/DL (ref 0.6–1.1)
ERYTHROCYTE [DISTWIDTH] IN BLOOD BY AUTOMATED COUNT: 14.3 % (ref 10–15)
FASTING STATUS PATIENT QL REPORTED: YES
GFR SERPL CREATININE-BSD FRML MDRD: 77 ML/MIN/1.73M2
GLUCOSE BLD-MCNC: 88 MG/DL (ref 70–125)
HCT VFR BLD AUTO: 39 % (ref 35–47)
HDLC SERPL-MCNC: 51 MG/DL
HGB BLD-MCNC: 13 G/DL (ref 11.7–15.7)
LDLC SERPL CALC-MCNC: 95 MG/DL
MCH RBC QN AUTO: 29.5 PG (ref 26.5–33)
MCHC RBC AUTO-ENTMCNC: 33.3 G/DL (ref 31.5–36.5)
MCV RBC AUTO: 88 FL (ref 78–100)
PLATELET # BLD AUTO: 270 10E3/UL (ref 150–450)
POTASSIUM BLD-SCNC: 4.9 MMOL/L (ref 3.5–5)
PROT SERPL-MCNC: 6.3 G/DL (ref 6–8)
RBC # BLD AUTO: 4.41 10E6/UL (ref 3.8–5.2)
SODIUM SERPL-SCNC: 140 MMOL/L (ref 136–145)
TRIGL SERPL-MCNC: 154 MG/DL
VIT B12 SERPL-MCNC: 990 PG/ML (ref 213–816)
WBC # BLD AUTO: 7.4 10E3/UL (ref 4–11)

## 2021-07-28 PROCEDURE — 80061 LIPID PANEL: CPT | Performed by: INTERNAL MEDICINE

## 2021-07-28 PROCEDURE — 99397 PER PM REEVAL EST PAT 65+ YR: CPT | Performed by: INTERNAL MEDICINE

## 2021-07-28 PROCEDURE — 82306 VITAMIN D 25 HYDROXY: CPT | Performed by: INTERNAL MEDICINE

## 2021-07-28 PROCEDURE — 82607 VITAMIN B-12: CPT | Performed by: INTERNAL MEDICINE

## 2021-07-28 PROCEDURE — 85027 COMPLETE CBC AUTOMATED: CPT | Performed by: INTERNAL MEDICINE

## 2021-07-28 PROCEDURE — 80053 COMPREHEN METABOLIC PANEL: CPT | Performed by: INTERNAL MEDICINE

## 2021-07-28 PROCEDURE — 36415 COLL VENOUS BLD VENIPUNCTURE: CPT | Performed by: INTERNAL MEDICINE

## 2021-07-28 ASSESSMENT — ACTIVITIES OF DAILY LIVING (ADL): CURRENT_FUNCTION: NO ASSISTANCE NEEDED

## 2021-07-28 NOTE — PATIENT INSTRUCTIONS
1.  Lab testing today.     2. Reassured about the hip symptoms, they should resolve.     3. Follow up in 6 months.   Patient Education   Personalized Prevention Plan  You are due for the preventive services outlined below.  Your care team is available to assist you in scheduling these services.  If you have already completed any of these items, please share that information with your care team to update in your medical record.  Health Maintenance Due   Topic Date Due     ANNUAL REVIEW OF HM ORDERS  Never done     Hepatitis C Screening  Never done

## 2021-07-28 NOTE — PROGRESS NOTES
SUBJECTIVE:   Raj Kim is a 71 year old female who presents for Preventive Visit.      Patient has been advised of split billing requirements and indicates understanding: Yes   Are you in the first 12 months of your Medicare coverage?  No    HPI: she is overall doing OK, no unusual dyspnea or cough. Her chronic intermittent stool incontinence has not worsened.  Voiding well.  Weight is up a few pounds from one year ago.  No symptoms of bleeding.  No neurologic symptoms.  Tolerating activity will.  Neuropathy symptoms have not worsened.     Healthy Habits:   PHQ-2 Total Score: 0    Do you feel safe in your environment? Yes    Have you ever done Advance Care Planning? (For example, a Health Directive, POLST, or a discussion with a medical provider or your loved ones about your wishes): Yes, advance care planning is on file.    Cognitive Screening   1) Repeat 3 items (Leader, Season, Table)    2) Clock draw: NORMAL  3) 3 item recall: Recalls 3 objects  Results: 3 items recalled: COGNITIVE IMPAIRMENT LESS LIKELY    Mini-CogTM Copyright JEWEL Wiley. Licensed by the author for use in [facility]; reprinted with permission (roro@.[facility]). All rights reserved.      Do you have sleep apnea, excessive snoring or daytime drowsiness?: no    Reviewed and updated as needed this visit by Provider     Social History     Tobacco Use     Smoking status: Former Smoker     Smokeless tobacco: Never Used   Substance Use Topics     Alcohol use: Yes     Comment: Alcoholic Drinks/day: occasional     Alcohol Use 7/28/2021   Prescreen: >3 drinks/day or >7 drinks/week? No     Patient Care Team:  Lorenzo Dunn MD as PCP - General  StocktonMarlin bowling MD as Assigned Surgical Provider    The following health maintenance items are reviewed in Epic and correct as of today:  Health Maintenance Due   Topic Date Due     ANNUAL REVIEW OF  ORDERS  Never done     HEPATITIS C SCREENING  Never done     FALL RISK ASSESSMENT   "07/27/2021     MEDICARE ANNUAL WELLNESS VISIT  07/27/2021     Review of Systems  See HPI    OBJECTIVE:   /80 (BP Location: Left arm, Patient Position: Sitting, Cuff Size: Adult Regular)   Pulse 78   Temp 98.1  F (36.7  C) (Tympanic)   Resp 18   Wt 61.3 kg (135 lb 3.2 oz)   LMP  (Exact Date)   SpO2 99%   BMI 22.50 kg/m   Estimated body mass index is 22.5 kg/m  as calculated from the following:    Height as of 7/27/20: 1.651 m (5' 5\").    Weight as of this encounter: 61.3 kg (135 lb 3.2 oz).    PHYSICAL EXAM:  General Appearance: In no acute distress  EYES: Clear   NECK:  without cervical or axillary adenopathy  RESPIRATORY: Clear  CARDIOVASCULAR: S1, S2  ABDOMEN: soft, flat, and non-tender  EXTREMITIES: No joint swelling, no ulcer or edema  NEUROLOGIC: No focal arm or leg  weakness, speech is clear, gait is normal    ASSESSMENT / PLAN:     1. Peripheral Neuropathy  Ongoing stable symptoms - related to her chemotherapy for ovarian cancer    2. Benign polyp of large intestine  Recent colonoscopy positive for polyps.  Recommend repeat in 3 years 2024.     3. Hyperlipidemia LDL goal <100  Need to assess her levels    4. Malignant neoplasm of ovary  Remote history.      5. Acquired lymphedema of leg  Secondary to history of ovarian cancer, and radiation treatment.     6. Vitamin B12 deficiency (non anemic)  Will monitor.  She takes oral SL B12.     7. Kyphosis (acquired) (postural)  Thoracic, She has moderate osteopenia on DXA, takes calcium and vitamin D, as well as estrogen replacement therapy    8. Vitamin D deficiency  Ongoing vitamin D replacement therapy.     Patient has been advised of split billing requirements and indicates understanding: Yes    Lorenzo Dunn MD  [facility]    Identified Health Risks:  "

## 2021-07-28 NOTE — LETTER
July 30, 2021      Raj Kim  [address] [place] [address]      Dear ,    We are writing to inform you of your test results.    Your tests are good.  The vitamin D is a bit low.  Continue taking vitamin D3 supplement.     Resulted Orders   Lipid Profile (Chol, Trig, HDL, LDL calc)   Result Value Ref Range    Cholesterol 177 <=199 mg/dL    Triglycerides 154 (H) <=149 mg/dL    Direct Measure HDL 51 >=50 mg/dL      Comment:      HDL Cholesterol Reference Range:     0-2 years:   No reference ranges established for patients under 2 years old  at [facility] for lipid analytes.    2-8 years:  Greater than 45 mg/dL     18 years and older:   Female: Greater than or equal to 50 mg/dL   Male:   Greater than or equal to 40 mg/dL    LDL Cholesterol Calculated 95 <=129 mg/dL    Patient Fasting > 8hrs? Yes    CBC with platelets   Result Value Ref Range    WBC Count 7.4 4.0 - 11.0 10e3/uL    RBC Count 4.41 3.80 - 5.20 10e6/uL    Hemoglobin 13.0 11.7 - 15.7 g/dL    Hematocrit 39.0 35.0 - 47.0 %    MCV 88 78 - 100 fL    MCH 29.5 26.5 - 33.0 pg    MCHC 33.3 31.5 - 36.5 g/dL    RDW 14.3 10.0 - 15.0 %    Platelet Count 270 150 - 450 10e3/uL   Comprehensive metabolic panel   Result Value Ref Range    Sodium 140 136 - 145 mmol/L    Potassium 4.9 3.5 - 5.0 mmol/L    Chloride 105 98 - 107 mmol/L    Carbon Dioxide (CO2) 25 22 - 31 mmol/L    Anion Gap 10 5 - 18 mmol/L    Urea Nitrogen 17 8 - 22 mg/dL    Creatinine 0.79 0.60 - 1.10 mg/dL    Calcium 9.9 8.5 - 10.5 mg/dL    Glucose 88 70 - 125 mg/dL    Alkaline Phosphatase 59 45 - 120 U/L    AST 25 0 - 40 U/L    ALT 23 0 - 45 U/L    Protein Total 6.3 6.0 - 8.0 g/dL    Albumin 3.5 3.5 - 5.0 g/dL    Bilirubin Total 0.6 0.0 - 1.0 mg/dL    GFR Estimate 77 >60 mL/min/1.73m2      Comment:      As of July 11, 2021, eGFR is calculated by the CKD-EPI creatinine equation, without race adjustment. eGFR can be influenced by muscle mass, exercise, and diet. The reported eGFR  is an estimation only and is only applicable if the renal function is stable.   Vitamin B12   Result Value Ref Range    Vitamin B12 990 (H) 213 - 816 pg/mL   Vitamin D Deficiency   Result Value Ref Range    Vitamin D, Total (25-Hydroxy) 26 (L) 30 - 80 ug/L    Narrative    Deficiency <10.0 ug/L  Insufficiency 10.0-29.9 ug/L  Sufficiency 30.0-80.0 ug/L  Toxicity (possible) >100.0 ug/L        If you have any questions or concerns, please call the clinic at the number listed above.       Sincerely,      Lorenzo Dunn MD

## 2021-07-29 LAB — DEPRECATED CALCIDIOL+CALCIFEROL SERPL-MC: 26 UG/L (ref 30–80)

## 2021-09-04 ENCOUNTER — HEALTH MAINTENANCE LETTER (OUTPATIENT)
Age: 71
End: 2021-09-04

## 2021-12-17 ENCOUNTER — OFFICE VISIT (OUTPATIENT)
Dept: OTOLARYNGOLOGY | Facility: CLINIC | Age: 71
End: 2021-12-17
Payer: COMMERCIAL

## 2021-12-17 DIAGNOSIS — M54.2 NECK PAIN: ICD-10-CM

## 2021-12-17 DIAGNOSIS — R13.10 DYSPHAGIA, UNSPECIFIED TYPE: Primary | ICD-10-CM

## 2021-12-17 PROCEDURE — 99213 OFFICE O/P EST LOW 20 MIN: CPT | Mod: 25 | Performed by: OTOLARYNGOLOGY

## 2021-12-17 PROCEDURE — 31575 DIAGNOSTIC LARYNGOSCOPY: CPT | Performed by: OTOLARYNGOLOGY

## 2021-12-17 NOTE — PROGRESS NOTES
HPI: This patient is a 71yo F who presents for re-evaluation of the throat. There has been a globus sensation for a few years. Feels like small, dry things like jelly beans and breads tend to need a second swallow to go down, same as when she was seen earlier this year. No aspiration or regurgitation. Denies fevers, otalgia, weight loss, odynophagia, dysphagia, hemoptysis, and shortness of breath. She has noticed some tenderness of the cartilages of her trachea for the past few weeks. This is not severe, just not going away. She had cancer in the past, elsewhere, and she wants to know there is no issue with this.     Past medical history, surgical history, social history, family history, medications, and allergies have been reviewed with the patient and are documented above.     Review of Systems: a 10-system review was performed. Pertinent positives are noted in the HPI and on a separate scanned document in the chart.     PHYSICAL EXAMINATION:  GEN: no acute distress, normocephalic  EYES: extraocular movements are intact, pupils are equal and round. Sclera clear.   EARS: auricles are normally formed. The external auditory canals are clear with minimal to no cerumen. Tympanic membranes are intact bilaterally with no signs of infection, effusion, retractions, or perforations.  NOSE: anterior nares are patent. There are no masses or lesions. The septum is non-obstructing. Dry on the right mid septum.  OC/OP: clear, dentition is in good repair. The tongue and palate are fully mobile and symmetric. No masses or lesions. Cobblestoning of the posterior pharyngeal wall. Tonsils 1+   HP/L (scope): nasopharynx, base of tongue, vallecula, epiglottis, and pyriform sinuses are clear. The bilateral vocal folds are mobile and without lesion. There is interarytenoid pachydermia and some hyperemia of the laryngeal surface of the epiglottis c/w LPR, unchanged from before.   NECK: soft and supple. No lymphadenopathy or masses. Airway  is midline. Tracheal cartilages are normal, mobile.   NEURO: CN VII and XII symmetric. alert and oriented. No spontaneous nystagmus. Gait is normal.  PULM: breathing comfortably on room air, normal chest expansion with respiration  CARDS: no cyanosis or clubbing, normal carotid pulses     FLEXIBLE LARYNGOSCOPY: Scope inserted bilaterally to examine nasal tissue, nasopharynx, posterior oropharynx, hypopharynx, and larynx. See exam notes for exam finding details. Patient tolerated the procedure well.     MEDICAL DECISION-MAKING: This patient is a 69yo F with chronic laryngitis/globus sensation from acid reflux, same as before. She has symptoms currently of a mild chondritis involving the trachea. She cannot use ibuprofen right now secondary to a recent hip procedure, but will use this when she can to help with inflammation. Reassurance was good for her. As for the swallowing thing, it is reasonable to do an esophagram. I am not suspecting pathology, but this will help calm her mind.

## 2021-12-17 NOTE — LETTER
12/17/2021         RE: Raj Kim  [address]  [place] [address]        Dear Colleague,    Thank you for referring your patient, Raj Kim, to the [facility]. Please see a copy of my visit note below.    HPI: This patient is a 69yo F who presents for re-evaluation of the throat. There has been a globus sensation for a few years. Feels like small, dry things like jelly beans and breads tend to need a second swallow to go down, same as when she was seen earlier this year. No aspiration or regurgitation. Denies fevers, otalgia, weight loss, odynophagia, dysphagia, hemoptysis, and shortness of breath. She has noticed some tenderness of the cartilages of her trachea for the past few weeks. This is not severe, just not going away. She had cancer in the past, elsewhere, and she wants to know there is no issue with this.     Past medical history, surgical history, social history, family history, medications, and allergies have been reviewed with the patient and are documented above.     Review of Systems: a 10-system review was performed. Pertinent positives are noted in the HPI and on a separate scanned document in the chart.     PHYSICAL EXAMINATION:  GEN: no acute distress, normocephalic  EYES: extraocular movements are intact, pupils are equal and round. Sclera clear.   EARS: auricles are normally formed. The external auditory canals are clear with minimal to no cerumen. Tympanic membranes are intact bilaterally with no signs of infection, effusion, retractions, or perforations.  NOSE: anterior nares are patent. There are no masses or lesions. The septum is non-obstructing. Dry on the right mid septum.  OC/OP: clear, dentition is in good repair. The tongue and palate are fully mobile and symmetric. No masses or lesions. Cobblestoning of the posterior pharyngeal wall. Tonsils 1+   HP/L (scope): nasopharynx, base of tongue, vallecula, epiglottis, and pyriform sinuses are clear.  The bilateral vocal folds are mobile and without lesion. There is interarytenoid pachydermia and some hyperemia of the laryngeal surface of the epiglottis c/w LPR, unchanged from before.   NECK: soft and supple. No lymphadenopathy or masses. Airway is midline. Tracheal cartilages are normal, mobile.   NEURO: CN VII and XII symmetric. alert and oriented. No spontaneous nystagmus. Gait is normal.  PULM: breathing comfortably on room air, normal chest expansion with respiration  CARDS: no cyanosis or clubbing, normal carotid pulses     FLEXIBLE LARYNGOSCOPY: Scope inserted bilaterally to examine nasal tissue, nasopharynx, posterior oropharynx, hypopharynx, and larynx. See exam notes for exam finding details. Patient tolerated the procedure well.     MEDICAL DECISION-MAKING: This patient is a 71yo F with chronic laryngitis/globus sensation from acid reflux, same as before. She has symptoms currently of a mild chondritis involving the trachea. She cannot use ibuprofen right now secondary to a recent hip procedure, but will use this when she can to help with inflammation. Reassurance was good for her. As for the swallowing thing, it is reasonable to do an esophagram. I am not suspecting pathology, but this will help calm her mind.       Again, thank you for allowing me to participate in the care of your patient.        Sincerely,        Marlin Harley MD

## 2021-12-22 ENCOUNTER — TELEPHONE (OUTPATIENT)
Dept: INTERNAL MEDICINE | Facility: CLINIC | Age: 71
End: 2021-12-22
Payer: COMMERCIAL

## 2021-12-22 PROBLEM — I82.A19: Status: ACTIVE | Noted: 2021-12-22

## 2021-12-22 NOTE — TELEPHONE ENCOUNTER
12/22 Pt made an appt with Dr Ramirez for Friday because of her blood clots in arm  Wanted you to know since  Called from [place] and talked to you today  PATRICIA

## 2021-12-23 NOTE — TELEPHONE ENCOUNTER
I spoke with [facility] today. She was seen at orthopedic surgery with arm symptoms.  DVT is present in upper extremity.  Mostly asymptomatic.  Unprovoked, except she is on estrogen.  She likely will need life long anticoagulation. She is started on xarelto, full DVT dosing - 15 mg po bid for 21 days, then 20 mg po daily.  She will follow up in the clinic in one week and sooner prn. Dr. Shaun MD

## 2021-12-24 ENCOUNTER — OFFICE VISIT (OUTPATIENT)
Dept: INTERNAL MEDICINE | Facility: CLINIC | Age: 71
End: 2021-12-24
Payer: COMMERCIAL

## 2021-12-24 VITALS
OXYGEN SATURATION: 92 % | BODY MASS INDEX: 23.13 KG/M2 | HEART RATE: 78 BPM | DIASTOLIC BLOOD PRESSURE: 54 MMHG | WEIGHT: 139 LBS | SYSTOLIC BLOOD PRESSURE: 115 MMHG

## 2021-12-24 DIAGNOSIS — I82.A11 ACUTE DEEP VEIN THROMBOSIS (DVT) OF AXILLARY VEIN OF RIGHT UPPER EXTREMITY (H): Primary | ICD-10-CM

## 2021-12-24 DIAGNOSIS — Z79.890 HORMONE REPLACEMENT THERAPY: ICD-10-CM

## 2021-12-24 PROBLEM — Z11.59 NEED FOR HEPATITIS C SCREENING TEST: Status: ACTIVE | Noted: 2021-12-24

## 2021-12-24 PROCEDURE — 99214 OFFICE O/P EST MOD 30 MIN: CPT | Performed by: INTERNAL MEDICINE

## 2021-12-24 RX ORDER — ESTRADIOL 0.5 MG/1
0.5 TABLET ORAL
COMMUNITY
Start: 2021-12-06 | End: 2021-12-28

## 2021-12-24 RX ORDER — RIVAROXABAN 15 MG-20MG
KIT ORAL
COMMUNITY
Start: 2021-12-22 | End: 2022-03-29

## 2021-12-24 NOTE — PROGRESS NOTES
[facility] Follow Up Note    Assessment/Plan:    1. Acute deep vein thrombosis (DVT) of axillary vein of right upper extremity (H)  Right upper extremity DVT could be considered precipitated due to history of oral estrogen treatment.    Unclear when blood develop since her symptoms have been minimal.  However since her clot burden is quite extensive I recommended consultation with vascular surgery.    She has Xarelto 15 mg tablets and continue taking it with food for 21 days after which she will change to Xarelto 20 mg daily.    She has had a history of distant ovarian cancer, in the future might consider doing CA-125 levels or abdominal imaging to rule out recurrence.    Recommended that she comes off estrogen and she will start tapering it.      Okay for her to travel.    If vascular also recommends conservative treatment, would be reasonable to repeat ultrasound of her right upper extremity at 3 months to see if clot has resolved.  That might determine whether she will need lifelong anticoagulation considering that the clot was triggered by her use of estrogen.  If she continues to have a chronic clot in her right upper extremity, she will most likely need lifelong anticoagulation.    - rivaroxaban ANTICOAGULANT (XARELTO) 20 MG TABS tablet; Take 1 tablet (20 mg) by mouth daily (with dinner)  Dispense: 90 tablet; Refill: 1  - Vascular Surgery Referral; Future    2. Hormone replacement therapy  Discussed to start tapering oral estrogen pill.  If that is problematic, she can see our pharmacist to transition to patches before coming off of it.  - Vascular Surgery Referral; Future      Patient Instructions   1. For Xarelto:    15 mg twice daily with food for 21 days followed by 20 mg once daily with food.     Avoid fish oil, NSAIDs, ASA while on Xarelto.  Can repeat US in 3 months.  Try getting off Estrogen (taper off). May have hot flushes for a few months.    If having problems with Xarelto coverage,  can ask to talk to our pharmacist: Gerardo Russell. She can also help with estrogen taper of needed.     Can see vascular surgery for additional recommendations.    Follow up with PCP in 3 months.           Grace Ramirez MD, MD    Chief Complaint:    Chief Complaint   Patient presents with     Deep Vein Thrombosis     right arm       History of Present Illness:  Raj is a 71 year old female with history of neuropathy, distant history of ovarian cancer, hyperlipidemia, lymphedema, kyphosis and radiation colitis who is currently here for an acute visit to follow-up for emergency room where she was diagnosed with extensive right upper extremity DVT.    Patient reports that she has never had blood clots and denies any recent travel or trauma.  She was seen an orthopedist for something that felt like a slight lump in her right bicep and he recommended having ultrasound done.  Surprisingly ultrasound showed extensive right internal jugular, axillary, subclavian and brachial DVT.  This is unprovoked however she has been on hormone replacement therapy with oral estrogen pill.  Currently she was started on Xarelto and is taking 50 mg twice a day with food.    She does not really have a lot of symptoms except for slight swelling around her elbow and more prominent veins in her right arm.  It is unclear when this clot developed.    Review of Systems:  A comprehensive review of systems was performed and was otherwise negative    PFSH:  Social History: Reviewed  History   Smoking Status     Former Smoker   Smokeless Tobacco     Never Used     Social History     Social History Narrative    . One adopted son Surendra- lives in [place] (age 35 in 2018). 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   avid bird watcher/hobbyist...(nick Whitley/isaiah Gutierrez); sister-Oneida Roberts.-primary contact         Past History: Reviewed  Current Outpatient Medications   Medication Sig Dispense Refill     betamethasone dipropionate  (DIPROLENE) 0.05 % ointment [BETAMETHASONE DIPROPIONATE (DIPROLENE) 0.05 % OINTMENT] Apply topically as needed.        calcium citrate-vitamin D3 (CITRACAL + D) 315-250 mg-unit per tablet [CALCIUM CITRATE-VITAMIN D3 (CITRACAL + D) 315-250 MG-UNIT PER TABLET] Take 1 tablet by mouth daily.       cholecalciferol, vitamin D3, (VITAMIN D3) 2,000 unit capsule [CHOLECALCIFEROL, VITAMIN D3, (VITAMIN D3) 2,000 UNIT CAPSULE] Take 5,000 Units by mouth daily.              cyanocobalamin, vitamin B-12, 1,000 mcg Subl [CYANOCOBALAMIN, VITAMIN B-12, 1,000 MCG SUBL] Place 1,000 mcg under the tongue daily.       desonide (DESOWEN) 0.05 % cream [DESONIDE (DESOWEN) 0.05 % CREAM] Apply topically as needed.        estradiol (ESTRACE) 0.5 MG tablet Take 0.5 mg by mouth       estradioL (ESTRACE) 0.5 MG tablet [ESTRADIOL (ESTRACE) 0.5 MG TABLET] Take 1 tablet (0.5 mg total) by mouth daily. 90 tablet 3     gabapentin (NEURONTIN) 100 MG capsule [GABAPENTIN (NEURONTIN) 100 MG CAPSULE] Take 200 mg by mouth 2 times a day at 6:00 am and 4:00 pm.        GABAPENTIN, ONCE-DAILY, PO Take 200 mg by mouth daily       Lactobacillus rhamnosus GG (CULTURELLE) 10-15 Billion cell capsule [LACTOBACILLUS RHAMNOSUS GG (CULTURELLE) 10-15 BILLION CELL CAPSULE] Take 1 capsule by mouth daily.       multivitamin with minerals (THERA-M) 9 mg iron-400 mcg Tab tablet [MULTIVITAMIN WITH MINERALS (THERA-M) 9 MG IRON-400 MCG TAB TABLET] Take 1 tablet by mouth daily.       OMEGA-3/DHA/EPA/FISH OIL (FISH OIL-OMEGA-3 FATTY ACIDS) 300-1,000 mg capsule [OMEGA-3/DHA/EPA/FISH OIL (FISH OIL-OMEGA-3 FATTY ACIDS) 300-1,000 MG CAPSULE] Take 2 g by mouth 2 (two) times a day.       psyllium with dextrose (METAMUCIL JAR) powder [PSYLLIUM WITH DEXTROSE (METAMUCIL JAR) POWDER] Take by mouth 3 (three) times a day. 1 tsp daily       rivaroxaban ANTICOAGULANT (XARELTO) 15 MG TABS tablet        rivaroxaban ANTICOAGULANT (XARELTO) 20 MG TABS tablet Take 1 tablet (20 mg) by mouth daily (with  dinner) 90 tablet 1     Rivaroxaban ANTICOAGULANT 15 & 20 MG TBPK Take as directed on package.       valACYclovir (VALTREX) 500 MG tablet [VALACYCLOVIR (VALTREX) 500 MG TABLET] Take 500 mg by mouth as needed.        XARELTO STARTER PACK ANTICOAGULANT 15 & 20 MG TBPK          Family History: Reviewed    Physical Exam:    Vitals:    12/24/21 1100   BP: 115/54   BP Location: Left arm   Patient Position: Sitting   Cuff Size: Adult Large   Pulse: 78   SpO2: 92%   Weight: 63 kg (139 lb)     Wt Readings from Last 3 Encounters:   12/24/21 63 kg (139 lb)   07/28/21 61.3 kg (135 lb 3.2 oz)   07/27/20 60.3 kg (133 lb)     Body mass index is 23.13 kg/m .    Constitutional:  Reveals a pleasant female.  Vitals:  Per nursing notes.  HEENT:No cervical LAD, no thyromegaly,  conjunctiva is pink, no scleral icterus, TMs are visualized and normal bl, oropharynx is clear, no exudates,   Cardiac:  Regular rate and rhythm,no murmurs, rubs, or gallops.  Legs without edema. Palpation of the radial pulse regular.  Lungs: Clear to auscultation bl.  Respiratory effort normal.  Abdomen:positive BS, soft, nontender, nondistended.  No hepato-splenomagaly  Skin:   Without rash, bruise, or palpable lesions.  Rheumatologic: Normal joints and nails of the hands.  Neurologic:  Cranial nerves II-XII intact.     Psychiatric: affect appropriate, memory intact.   Right upper extremity looks slightly swollen at the antecubital area and the bicep but no hand swelling, no pain no cords palpated vascular veins are more prominent in the bicep tendon right upper chest but no neck swelling noted.    Data Review:    Analysis and Summary of Old Records (2): yes      Records Requested (1): no      Other History Summarized (from other people in the room) (2): no    Radiology Tests Summarized (XRAY/CT/MRI/DXA) (1): us    Labs Reviewed (1): yes    Medicine Tests Reviewed (EKG/ECHO/COLONOSCOPY/EGD) (1): no    Independent Review of EKG or X-RAY (2): no

## 2021-12-24 NOTE — PATIENT INSTRUCTIONS
1. For Xarelto:    15 mg twice daily with food for 21 days followed by 20 mg once daily with food.     Avoid fish oil, NSAIDs, ASA while on Xarelto.  Can repeat US in 3 months.  Try getting off Estrogen (taper off). May have hot flushes for a few months.    If having problems with Xarelto coverage, can ask to talk to our pharmacist: Gerardo Russell. She can also help with estrogen taper of needed.     Can see vascular surgery for additional recommendations.    Follow up with PCP in 3 months.

## 2021-12-27 NOTE — TELEPHONE ENCOUNTER
Patient is calling followin up on the vascular referral that was sent, she called on Friday and was told someone would call her back by 10 on Dec 12/24.  She called this morning and still hasn't heard from us. Please advise.

## 2021-12-28 ENCOUNTER — OFFICE VISIT (OUTPATIENT)
Dept: VASCULAR SURGERY | Facility: CLINIC | Age: 71
End: 2021-12-28
Attending: RADIOLOGY
Payer: COMMERCIAL

## 2021-12-28 VITALS — DIASTOLIC BLOOD PRESSURE: 90 MMHG | RESPIRATION RATE: 16 BRPM | SYSTOLIC BLOOD PRESSURE: 130 MMHG | HEART RATE: 96 BPM

## 2021-12-28 DIAGNOSIS — Z79.890 HORMONE REPLACEMENT THERAPY: ICD-10-CM

## 2021-12-28 DIAGNOSIS — I82.A11 ACUTE DEEP VEIN THROMBOSIS (DVT) OF AXILLARY VEIN OF RIGHT UPPER EXTREMITY (H): ICD-10-CM

## 2021-12-28 PROCEDURE — G0463 HOSPITAL OUTPT CLINIC VISIT: HCPCS

## 2021-12-28 NOTE — PATIENT INSTRUCTIONS
Venous Insufficiency Ultrasound    Description  A venous insufficiency ultrasound is a relatively pain free exam. The vascular laboratory will contain a bed and just two or three pieces of equipment. You will be asked to remove pants or shorts and gowns will be provided. It usually takes about 30-60 minutes.  The technician will tuck a towel under your underpants in the groin. The gel is water-soluble and will not stain your skin or clothes.  Ultrasound gel, usually warmed for your comfort, will be placed on the inner side of your legs.    Through the gel, the technician will apply to your legs a small hand-held device that emits sound waves. The technician will be applying pressure to your legs at certain levels.   When the test is completed, the technician will remove excess gel from your legs.    Risks  There are typically no side effects or complications associated with a lower extremity venous insufficiency duplex ultrasound.  How to Prepare  Eat and take medications as usual.  There is no preparation required for a lower extremity venous insufficiency duplex ultrasound.  What Can I Expect After the Test?  The technician will send the ultrasound images to your vascular surgeon for evaluation. Typically, a report is available in 2-3 days. If anything critical is found, it is standard practice to notify the vascular surgeon immediately.  Reference: https://vascular.org/patient-resources/vascular-conditions/chronic-venous-insufficiency

## 2021-12-28 NOTE — PROGRESS NOTES
VASCULAR OUTPATIENT CONSULT OR VISIT  PHYSICIAN: Adi Jimenez MD  NEW PATIENT    LOCATION: [facility]    Raj Kim   Medical Record #:  [id number]  YOB: 1950  Age:  71 year old     Date of Service: 12/28/2021    PRIMARY CARE PROVIDER: Lorenzo Dunn    Reason for visit: Right upper extremity deep vein thrombosis    IMPRESSION: 71-year-old female with newly diagnosis, first lifetime episode of venous thromboembolism with extensive right upper extremity deep vein thrombosis.  Her only obvious risk factor is low-dose estrogen therapy.  She has received 3 Covid vaccination doses.  She is asymptomatic despise the diffuse involvement of right upper extremity veins.    RECOMMENDATION:    1.  Continue anticoagulation with Xarelto.  I would suggest 3 to 6 months of anticoagulation as per ACCP guidelines.  2.  No indication for hypercoagulable work-up  3.  No indication for aggressive thrombolysis/mechanical thrombectomy given she is completely asymptomatic.  4.  Obtain repeat right upper extremity venous ultrasound in 3 months to evaluate response  5.  Low suspicion for venous thoracic outlet syndrome, this can be confirmed with a right upper extremity venogram once thrombus resolves.    Plan for a 3-month clinic follow-up to evaluate response.    HPI:  Raj Kim is a 71 year old female who was evaluated today for newly diagnosed right upper extremity deep vein thrombosis.  The patient reports she incidentally noted a palpable abnormality in her right upper extremity which prompted a right upper extremity venous duplex.  Imaging demonstrates significant thrombus involving the right brachial, cephalic, basilic, axillary, subclavian and internal jugular veins.  She denies any trauma to the right upper extremity.  She denies any physical exertion or exercise of the right upper extremity.  She has been started on anticoagulation with Xarelto.  No prior history of venous thromboembolism.   She has been on longstanding hormone replacement therapy with estrogen.    PHH:    Past Medical History:   Diagnosis Date     Acute deep vein thrombosis (DVT) of axillary vein (H)      Fecal incontinence 07/27/2020     Hormone replacement therapy      Hx of radiation therapy      Osteopenia of multiple sites 05/02/2019     Ovarian cancer (H) 01/01/1980     Peripheral neuropathy, secondary to drugs or chemicals      Radiation colitis      Sensorineural hearing loss, bilateral 05/02/2019     Vitamin B 12 deficiency         Past Surgical History:   Procedure Laterality Date     FINGER SURGERY Right 01/01/2016    Dr. [facility]     HYSTERECTOMY  01/01/1980     LAPAROSCOPIC CHOLECYSTECTOMY       LAPAROTOMY EXPLORATORY      ovary/omentum abnormalities     OOPHORECTOMY Bilateral 01/01/1980       ALLERGIES:  Celecoxib, Cephalosporins, Hydrocodone-acetaminophen, and Penicillins    MEDS:    Current Outpatient Medications:      betamethasone dipropionate (DIPROLENE) 0.05 % ointment, [BETAMETHASONE DIPROPIONATE (DIPROLENE) 0.05 % OINTMENT] Apply topically as needed. , Disp: , Rfl:      calcium citrate-vitamin D3 (CITRACAL + D) 315-250 mg-unit per tablet, [CALCIUM CITRATE-VITAMIN D3 (CITRACAL + D) 315-250 MG-UNIT PER TABLET] Take 1 tablet by mouth daily., Disp: , Rfl:      cholecalciferol, vitamin D3, (VITAMIN D3) 2,000 unit capsule, [CHOLECALCIFEROL, VITAMIN D3, (VITAMIN D3) 2,000 UNIT CAPSULE] Take 5,000 Units by mouth daily.       , Disp: , Rfl:      cyanocobalamin, vitamin B-12, 1,000 mcg Subl, [CYANOCOBALAMIN, VITAMIN B-12, 1,000 MCG SUBL] Place 1,000 mcg under the tongue daily., Disp: , Rfl:      desonide (DESOWEN) 0.05 % cream, [DESONIDE (DESOWEN) 0.05 % CREAM] Apply topically as needed. , Disp: , Rfl:      estradioL (ESTRACE) 0.5 MG tablet, [ESTRADIOL (ESTRACE) 0.5 MG TABLET] Take 1 tablet (0.5 mg total) by mouth daily., Disp: 90 tablet, Rfl: 3     GABAPENTIN, ONCE-DAILY, PO, Take 200 mg by mouth daily, Disp: ,  Rfl:      Lactobacillus rhamnosus GG (CULTURELLE) 10-15 Billion cell capsule, [LACTOBACILLUS RHAMNOSUS GG (CULTURELLE) 10-15 BILLION CELL CAPSULE] Take 1 capsule by mouth daily., Disp: , Rfl:      Misc Natural Products (TART CHERRY ADVANCED PO), , Disp: , Rfl:      multivitamin with minerals (THERA-M) 9 mg iron-400 mcg Tab tablet, [MULTIVITAMIN WITH MINERALS (THERA-M) 9 MG IRON-400 MCG TAB TABLET] Take 1 tablet by mouth daily., Disp: , Rfl:      psyllium with dextrose (METAMUCIL JAR) powder, [PSYLLIUM WITH DEXTROSE (METAMUCIL JAR) POWDER] Take by mouth 3 (three) times a day. 1 tsp daily, Disp: , Rfl:      rivaroxaban ANTICOAGULANT (XARELTO) 20 MG TABS tablet, Take 1 tablet (20 mg) by mouth daily (with dinner), Disp: 90 tablet, Rfl: 1     valACYclovir (VALTREX) 500 MG tablet, [VALACYCLOVIR (VALTREX) 500 MG TABLET] Take 500 mg by mouth as needed. , Disp: , Rfl:      XARELTO STARTER PACK ANTICOAGULANT 15 & 20 MG TBPK, , Disp: , Rfl:     SOCIAL HABITS:    History   Smoking Status     Former Smoker   Smokeless Tobacco     Never Used     Social History    Substance and Sexual Activity      Alcohol use: Yes        Comment: Alcoholic Drinks/day: occasional      History   Drug Use No       FAMILY HISTORY:    Family History   Problem Relation Age of Onset     Colon Cancer Maternal Aunt      Breast Cancer Maternal Aunt      Diabetes Father      Breast Cancer Maternal Grandmother        ADVANCE CARE DIRECTIVES:    Advance care directives reviewed in the chart and no changes made.     PE:  BP (!) 130/90   Pulse 96   Resp 16   Wt Readings from Last 1 Encounters:   12/24/21 63 kg (139 lb)     There is no height or weight on file to calculate BMI.    EXAM:  GENERAL: This is a well-developed 71 year old female who appears her stated age  EYES: Grossly normal.  MOUTH: Buccal mucosa normal   MUSCULOSKELETAL: Grossly normal and both lower extremities are intact.  HEME/LYMPH: No lymphedema  NEUROLOGIC: Focally intact, Alert and  oriented x 3.   PSYCH: appropriate affect  INTEGUMENT: No open lesions or ulcers    DIAGNOSTIC STUDIES:     US VENOUS UPPER EXTREMITY RIGHT    Result Date: 12/22/2021  For Patients: As a result of the 21st Century Cures Act, medical imaging exams and procedure reports are released immediately into your electronic medical record. You may view this report before your referring provider. If you have questions, please contact your health care provider. EXAM: US VENOUS UPPER EXTREMITY RIGHT LOCATION: [facility] [place] DATE/TIME: 12/22/2021 11:06 AM INDICATION: Right arm pain and swelling. COMPARISON: None. TECHNIQUE: Venous Duplex ultrasound of the right upper extremity with (when possible) and without compression, augmentation, and duplex. Color flow and spectral Doppler with waveform analysis performed. FINDINGS: Ultrasound includes evaluation of the internal jugular vein, innominate vein, subclavian vein, axillary vein, and brachial vein. The superficial cephalic and basilic veins were also evaluated where seen. RIGHT: Acute appearing occlusive DVT in the internal jugular vein. Occlusive DVT also in the subclavian, axillary, and one brachial vein proximally. There is SVT in the entire basilic vein. Radial and ulnar veins at the wrist appear normal. IMPRESSION: 1. Extensive right internal jugular, subclavian, axillary, and brachial DVT. 2. Right arm basilic SVT.      I personally reviewed the images and my interpretation is extensive right upper ext acute/subacute DVT.    LABS:      Sodium   Date Value Ref Range Status   07/28/2021 140 136 - 145 mmol/L Final   07/27/2020 141 136 - 145 mmol/L Final   05/02/2019 142 136 - 145 mmol/L Final     Urea Nitrogen   Date Value Ref Range Status   07/28/2021 17 8 - 22 mg/dL Final   07/27/2020 14 8 - 22 mg/dL Final   05/02/2019 15 8 - 22 mg/dL Final     Hemoglobin   Date Value Ref Range Status   07/28/2021 13.0 11.7 - 15.7 g/dL Final   07/27/2020 14.6 12.0 - 16.0 g/dL Final    07/03/2019 14.2 12.0 - 16.0 g/dL Final     Platelet Count   Date Value Ref Range Status   07/28/2021 270 150 - 450 10e3/uL Final   07/27/2020 305 140 - 440 thou/uL Final   07/03/2019 355 140 - 440 thou/uL Final       This was a in person visit in which 45 minutes of  total time was spent (either in face-to-face or non-face-to-face time).    Adi Jimenez MD, MD, [facility]  VASCULAR AND INTERVENTIONAL PHYSICIAN  VASCULAR MEDICINE  INTERNAL MEDICINE  PAGER: [phone]  CALL SERVICE: [phone]

## 2022-03-21 ENCOUNTER — TELEPHONE (OUTPATIENT)
Dept: INTERNAL MEDICINE | Facility: CLINIC | Age: 72
End: 2022-03-21
Payer: COMMERCIAL

## 2022-03-21 NOTE — TELEPHONE ENCOUNTER
Reason for Call:  Other call back    Detailed comments: pt is calling in and having dental prep for a bridge on Thursday 3/24 and needing to know if she needs to hold or change her medication.    XARELTO STARTER PACK ANTICOAGULANT  20 MG TBPK    Please call pt back     Phone Number Patient can be reached at: Cell number on file:    Telephone Information:   Mobile [phone]       Best Time: any    Can we leave a detailed message on this number? YES    Call taken on 3/21/2022 at 3:30 PM by Pam J. Behr

## 2022-03-21 NOTE — TELEPHONE ENCOUNTER
Per my review of the chart, it looks like she is on Xarelto for an upper extremity DVT.  It looks like vascular medicine recommended 3 to 6 months of anticoagulation with a repeat ultrasound towards the end of March.  Ideally her anticoagulation would be uninterrupted.  Does she need to hold anticoagulation for this procedure?  This would be determined by the oral surgeon/dentist.  If she does need to hold anticoagulation, could she reschedule her dental work until after a decision has been made on her anticoagulation?

## 2022-03-22 NOTE — TELEPHONE ENCOUNTER
I spoke with Raj, and communicated that she should continue on Xarelto uninterrupted.  She had been also communicating with her dental office and at this time they did not say that she had to stop her anticoagulation, rather wanting her to touch base with her primary doctor about this.  She will continue on Xarelto, and follow-up with ultrasound as scheduled.

## 2022-03-25 ENCOUNTER — TELEPHONE (OUTPATIENT)
Dept: VASCULAR SURGERY | Facility: CLINIC | Age: 72
End: 2022-03-25
Payer: COMMERCIAL

## 2022-03-25 NOTE — TELEPHONE ENCOUNTER
Patient is calling concerned that she's just having an ultrasound on her right arm on Tuesday.  She is concerned about clots in her legs and is requesting an ultrasound of her legs too. Please advise and call patient with an update.

## 2022-03-25 NOTE — TELEPHONE ENCOUNTER
Spoke with patient.  Her legs are asymptomatic for DVT.  Will discuss with Dr. Jimenez during clinic at upcoming appointment to decide if imaging appropriate.  Patient will arrive for visit at scheduled time and can add on ultrasound if necessary.

## 2022-03-29 ENCOUNTER — ANCILLARY PROCEDURE (OUTPATIENT)
Dept: VASCULAR ULTRASOUND | Facility: CLINIC | Age: 72
End: 2022-03-29
Attending: RADIOLOGY
Payer: COMMERCIAL

## 2022-03-29 ENCOUNTER — OFFICE VISIT (OUTPATIENT)
Dept: VASCULAR SURGERY | Facility: CLINIC | Age: 72
End: 2022-03-29
Attending: RADIOLOGY
Payer: COMMERCIAL

## 2022-03-29 VITALS
TEMPERATURE: 98.3 F | RESPIRATION RATE: 16 BRPM | DIASTOLIC BLOOD PRESSURE: 64 MMHG | HEART RATE: 80 BPM | SYSTOLIC BLOOD PRESSURE: 120 MMHG

## 2022-03-29 DIAGNOSIS — I82.A11 ACUTE DEEP VEIN THROMBOSIS (DVT) OF AXILLARY VEIN OF RIGHT UPPER EXTREMITY (H): Primary | ICD-10-CM

## 2022-03-29 DIAGNOSIS — I82.A11 ACUTE DEEP VEIN THROMBOSIS (DVT) OF AXILLARY VEIN OF RIGHT UPPER EXTREMITY (H): ICD-10-CM

## 2022-03-29 PROCEDURE — 93971 EXTREMITY STUDY: CPT | Mod: RT

## 2022-03-29 ASSESSMENT — PAIN SCALES - GENERAL: PAINLEVEL: NO PAIN (0)

## 2022-03-29 NOTE — PROGRESS NOTES
"[facility]        Patient is here for a 3 month follow up  to discuss thrombosis in right arm    Pt is currently taking Xarelto.    /64   Pulse 80   Temp 98.3  F (36.8  C)   Resp 16     The provider has been notified that the patient has no concerns.     Questions patient would like addressed today are: If she should have ultrasound of legs to \"see if clot there too\".    Refills are needed: No    Has homecare services and agency name:  Kaleigh Domingo RN      "

## 2022-03-29 NOTE — PATIENT INSTRUCTIONS
Return in about 3 months.    Patient Education     Deep Vein Thrombosis (DVT)  Deep vein thrombosis (DVT) occurs when a blood clot (thrombus) forms in a deep vein. This happens most often in the leg. It can also happen in the arms or other parts of the body. A part of the clot called an embolus can break off and travel to the lungs. When this happens, it s called a pulmonary embolism (PE). PE is a medical emergency. It can cut off blood flow and lead to death. Both DVT and PE are closely related. Together, they are often referred to by the term venous thromboembolism (VTE).     Risk factors for DVT  Anything that slows blood flow, injures the lining of a vein, or increases blood clotting can make you more prone to having DVT. This includes the following:     Long periods without movement (such as when sitting for many hours at a time or when recovering from major surgery or illness)    Estrogen (female hormone) therapy, such as hormone replacement therapy (HRT) or birth control pills    Fractured hip or leg    Major surgery or joint replacement    Major trauma or spinal cord injury    Cancer    Family history    Excess weight or obesity    Smoking    Older age  Symptoms  DVT does not always cause symptoms. When symptoms do occur, they may appear around the site of the DVT, such as in the leg. Possible symptoms include:     Swelling    Pain    Warmth    Redness    Tenderness  Home care    You were likely prescribed blood thinners (anticoagulants). They may be given as pills (oral) or shots (injections). Follow all instructions when using these medicines. Note: Don't take blood thinners with other medicines, herbal remedies, or supplements without talking to your provider first. Certain medicines or products can affect how blood thinners work.    Follow your provider s instructions about activity and rest.    If support or compression stockings are prescribed, wear them as directed. These may help improve blood flow  in the legs.    When sitting or lying down, move your ankles, toes and knees often. This may also help improve blood flow in the legs.    Follow-up care  Follow up with your healthcare provider, or as advised. If imaging tests were done, they may need further review by a doctor. You will be told of any new findings that may affect your care.   When to seek medical advice  Call your healthcare provider right away if any of these occur:    New or increased swelling, pain, tenderness, warmth, or redness, in the leg, arm, or other area    Blood in the urine    Bleeding with bowel movements  Call 911  Call 911 if any of these occur:     Bleeding from the nose, gums, a cut, or vagina    Heavy or uncontrolled bleeding    Trouble breathing    Chest pain or discomfort that worsens with deep breathing or coughing    Coughing (may cough up blood)    Fast heartbeat    Sweating    Anxiety    Lightheadedness, dizziness, or fainting  [employer] last reviewed this educational content on 4/1/2018 2000-2021 [employer]. All rights reserved. This information is not intended as a substitute for professional medical care. Always follow your healthcare professional's instructions.

## 2022-03-29 NOTE — PROGRESS NOTES
VASCULAR AND INTERVENTIONAL OUTPATIENT CONSULT OR VISIT  PHYSICIAN: Adi Jimenez MD  ESTABLISHED PATIENT    LOCATION: [facility]    Raj Kim   Medical Record #:  [id number]  YOB: 1950  Age:  71 year old     Date of Service: 3/29/2022    PRIMARY CARE PROVIDER: Lorenzo Dunn    Reason for visit: Right upper extremity deep vein thrombosis     IMPRESSION: 71-year-old female with newly diagnosis, first lifetime episode of venous thromboembolism with extensive right upper extremity deep vein thrombosis diagnosed on ultrasound study dated 12/22/2021.  Her identified risk factors at that time include estrogen replacement therapy as well as receiving 3 Covid vaccinations.   Estrogen replacement therapy has been discontinued.  She is asymptomatic despise the diffuse involvement of right upper extremity veins.  She has been tolerating anticoagulation with Xarelto without any major adverse events.  Repeat ultrasound performed today demonstrates interval improvement with partial recanalization of the right internal jugular and subclavian veins.     RECOMMENDATION:    1.  Continue anticoagulation with Xarelto.  I would suggest another 3 months of anticoagulation given residual thrombus identified on ultrasound today.  2.  Obtain repeat right upper extremity venous ultrasound in 3 months to evaluate progress     Plan for a 3-month clinic follow-up/US to evaluate response.     HPI:  Raj Kim is a 71 year old female who was seen today in follow up for recently diagnosed right upper extremity deep vein thrombosis.  The patient reports she incidentally noted a palpable abnormality in her right upper extremity which prompted a right upper extremity venous duplex.  The ultrasound was performed at the [facility] in [place] on 10/22/2021. Imaging at that time demonstrated significant thrombus involving the right brachial, cephalic, basilic, axillary, subclavian and internal jugular veins.   She was subsequently started on anticoagulation with Xarelto.  The patient denies any hematuria, bloody stools, hemoptysis or significant episodes of bleeding.  No recent falls identified.  The patient remains asymptomatic in her right upper extremity.  No lower extremity pain or swelling.  No shortness of breath, dyspnea or chest pain.  The patient has discontinued her estrogen replacement therapy.    PHH:    Past Medical History:   Diagnosis Date     Acute deep vein thrombosis (DVT) of axillary vein (H)      Fecal incontinence 07/27/2020     Hormone replacement therapy      Hx of radiation therapy      Osteopenia of multiple sites 05/02/2019     Ovarian cancer (H) 01/01/1980     Peripheral neuropathy, secondary to drugs or chemicals      Radiation colitis      Sensorineural hearing loss, bilateral 05/02/2019     Vitamin B 12 deficiency         Past Surgical History:   Procedure Laterality Date     FINGER SURGERY Right 01/01/2016    Dr. [facility]     HYSTERECTOMY  01/01/1980     LAPAROSCOPIC CHOLECYSTECTOMY       LAPAROTOMY EXPLORATORY      ovary/omentum abnormalities     OOPHORECTOMY Bilateral 01/01/1980       ALLERGIES:  Celecoxib, Cephalosporins, Hydrocodone-acetaminophen, and Penicillins    MEDS:    Current Outpatient Medications:      calcium citrate-vitamin D3 (CITRACAL + D) 315-250 mg-unit per tablet, [CALCIUM CITRATE-VITAMIN D3 (CITRACAL + D) 315-250 MG-UNIT PER TABLET] Take 1 tablet by mouth daily., Disp: , Rfl:      cholecalciferol, vitamin D3, (VITAMIN D3) 2,000 unit capsule, [CHOLECALCIFEROL, VITAMIN D3, (VITAMIN D3) 2,000 UNIT CAPSULE] Take 5,000 Units by mouth daily.       , Disp: , Rfl:      cyanocobalamin, vitamin B-12, 1,000 mcg Subl, [CYANOCOBALAMIN, VITAMIN B-12, 1,000 MCG SUBL] Place 1,000 mcg under the tongue daily., Disp: , Rfl:      GABAPENTIN, ONCE-DAILY, PO, Take 200 mg by mouth daily, Disp: , Rfl:      Lactobacillus rhamnosus GG (CULTURELLE) 10-15 Billion cell capsule,  [LACTOBACILLUS RHAMNOSUS GG (CULTURELLE) 10-15 BILLION CELL CAPSULE] Take 1 capsule by mouth daily., Disp: , Rfl:      Misc Natural Products (TART CHERRY ADVANCED PO), , Disp: , Rfl:      multivitamin with minerals (THERA-M) 9 mg iron-400 mcg Tab tablet, [MULTIVITAMIN WITH MINERALS (THERA-M) 9 MG IRON-400 MCG TAB TABLET] Take 1 tablet by mouth daily., Disp: , Rfl:      psyllium with dextrose (METAMUCIL JAR) powder, [PSYLLIUM WITH DEXTROSE (METAMUCIL JAR) POWDER] Take by mouth 3 (three) times a day. 1 tsp daily, Disp: , Rfl:      rivaroxaban ANTICOAGULANT (XARELTO) 20 MG TABS tablet, Take 1 tablet (20 mg) by mouth daily (with dinner), Disp: 90 tablet, Rfl: 1     betamethasone dipropionate (DIPROLENE) 0.05 % ointment, [BETAMETHASONE DIPROPIONATE (DIPROLENE) 0.05 % OINTMENT] Apply topically as needed.  (Patient not taking: Reported on 3/29/2022), Disp: , Rfl:      desonide (DESOWEN) 0.05 % cream, [DESONIDE (DESOWEN) 0.05 % CREAM] Apply topically as needed.  (Patient not taking: Reported on 3/29/2022), Disp: , Rfl:      valACYclovir (VALTREX) 500 MG tablet, [VALACYCLOVIR (VALTREX) 500 MG TABLET] Take 500 mg by mouth as needed.  (Patient not taking: Reported on 3/29/2022), Disp: , Rfl:     SOCIAL HABITS:    History   Smoking Status     Former Smoker   Smokeless Tobacco     Never Used     Social History    Substance and Sexual Activity      Alcohol use: Yes        Comment: Alcoholic Drinks/day: occasional      History   Drug Use No       FAMILY HISTORY:    Family History   Problem Relation Age of Onset     Colon Cancer Maternal Aunt      Breast Cancer Maternal Aunt      Diabetes Father      Breast Cancer Maternal Grandmother        ADVANCE CARE DIRECTIVES:    Advance care directives reviewed in the chart and no changes made.     PE:  /64   Pulse 80   Temp 98.3  F (36.8  C)   Resp 16   Wt Readings from Last 1 Encounters:   12/24/21 63 kg (139 lb)     There is no height or weight on file to calculate  BMI.    EXAM:  GENERAL: This is a well-developed 71 year old female who appears her stated age  EYES: Grossly normal.  MOUTH: Buccal mucosa normal   MUSCULOSKELETAL: Grossly normal and both lower extremities are intact.  HEME/LYMPH: No lymphedema  NEUROLOGIC: Focally intact, Alert and oriented x 3.   PSYCH: appropriate affect  INTEGUMENT: No open lesions or ulcers    DIAGNOSTIC STUDIES:     Images:  US Upper Extremity Venous Duplex Right    Result Date: 3/29/2022  RIGHT Venous Ultrasound (Date: 03/29/22) RIGHT Upper Extremity Indication:  History of extensive right internal jugular, subclavian, axillary, and brachial DVT, Right arm basilic SVT/patient on anticoagulants.  Right upper extremity deep vein thrombosis/swelling Previous: Right upper extremity venous duplex dated 12/22/21 Patient History Includes: DVT and Anticoagulants Right  ARM Location IJV SUBC.V  MOIZ. V BRV. MID RAD.V  ULN. V. CEPH. V BAS. V MID Compressibility (FC,PC,NC) PC PC NC FC FC FC FC PC Spontaneous + + 0 +     Phasic  + + 0 +     Augmentation + + 0 +     Patency - - 0 + + + + - Findings: Interval partial recanalization of the internal jugular and subclavian veins.  Unchanged occlusive thrombosis of the axillary vein.  Patent brachial vein.  Partial compressibility of the basilic vein.  Full compressibility of the cephalic vein. Impression: Improvement in right upper extremity deep vein thrombosis with partial recanalization of the internal jugular and subclavian veins. Reference: Compressibility: FC= Fully compressible, PC= Partially compressible, NC= Non-compressible, NV= Not Visualized Venous Doppler: (+) = Present  (0) = Absent  (-) = Decreased/Unable to Evaluate, (NV) = Not Visualized      I personally reviewed the images and my interpretation is as above.    LABS:      Sodium   Date Value Ref Range Status   07/28/2021 140 136 - 145 mmol/L Final   07/27/2020 141 136 - 145 mmol/L Final   05/02/2019 142 136 - 145 mmol/L Final     Urea  Nitrogen   Date Value Ref Range Status   07/28/2021 17 8 - 22 mg/dL Final   07/27/2020 14 8 - 22 mg/dL Final   05/02/2019 15 8 - 22 mg/dL Final     Hemoglobin   Date Value Ref Range Status   07/28/2021 13.0 11.7 - 15.7 g/dL Final   07/27/2020 14.6 12.0 - 16.0 g/dL Final   07/03/2019 14.2 12.0 - 16.0 g/dL Final     Platelet Count   Date Value Ref Range Status   07/28/2021 270 150 - 450 10e3/uL Final   07/27/2020 305 140 - 440 thou/uL Final   07/03/2019 355 140 - 440 thou/uL Final       This was a in person visit in which 30 minutes of  total time was spent (either in face-to-face or non-face-to-face time).    Adi Jimenez MD, MD, [facility]  VASCULAR AND INTERVENTIONAL PHYSICIAN  VASCULAR MEDICINE  INTERNAL MEDICINE  PAGER: [phone]  CALL SERVICE: [phone]

## 2022-04-14 ENCOUNTER — TELEPHONE (OUTPATIENT)
Dept: NURSING | Facility: CLINIC | Age: 72
End: 2022-04-14
Payer: COMMERCIAL

## 2022-04-14 NOTE — TELEPHONE ENCOUNTER
Telephone call    Patient calling to make a appointment with Doctor Shaun and she was told he was going to be out till middle of may so she was wanting to make a appointment after he got back.  Called [place] [facility] and they said he will be back on May 9th  So transferred her to scheduling for a appointment.    Alka Rojas RN   [facility] Nurse Advisor  10:22 AM 4/14/2022

## 2022-05-23 ASSESSMENT — ACTIVITIES OF DAILY LIVING (ADL): CURRENT_FUNCTION: NO ASSISTANCE NEEDED

## 2022-05-26 ENCOUNTER — OFFICE VISIT (OUTPATIENT)
Dept: INTERNAL MEDICINE | Facility: CLINIC | Age: 72
End: 2022-05-26
Payer: COMMERCIAL

## 2022-05-26 VITALS
RESPIRATION RATE: 16 BRPM | TEMPERATURE: 97.6 F | HEART RATE: 86 BPM | DIASTOLIC BLOOD PRESSURE: 64 MMHG | WEIGHT: 133.3 LBS | SYSTOLIC BLOOD PRESSURE: 120 MMHG | HEIGHT: 65 IN | OXYGEN SATURATION: 98 % | BODY MASS INDEX: 22.21 KG/M2

## 2022-05-26 DIAGNOSIS — E53.8 VITAMIN B12 DEFICIENCY (NON ANEMIC): ICD-10-CM

## 2022-05-26 DIAGNOSIS — Z12.31 VISIT FOR SCREENING MAMMOGRAM: ICD-10-CM

## 2022-05-26 DIAGNOSIS — Z11.59 NEED FOR HEPATITIS C SCREENING TEST: Primary | ICD-10-CM

## 2022-05-26 DIAGNOSIS — E55.9 VITAMIN D DEFICIENCY: ICD-10-CM

## 2022-05-26 DIAGNOSIS — Z00.00 ENCOUNTER FOR MEDICARE ANNUAL WELLNESS EXAM: ICD-10-CM

## 2022-05-26 DIAGNOSIS — R25.2 LEG CRAMPS: ICD-10-CM

## 2022-05-26 DIAGNOSIS — E78.5 HYPERLIPIDEMIA LDL GOAL <100: ICD-10-CM

## 2022-05-26 LAB
ALBUMIN SERPL-MCNC: 3.9 G/DL (ref 3.5–5)
ALP SERPL-CCNC: 61 U/L (ref 45–120)
ALT SERPL W P-5'-P-CCNC: 30 U/L (ref 0–45)
ANION GAP SERPL CALCULATED.3IONS-SCNC: 8 MMOL/L (ref 5–18)
AST SERPL W P-5'-P-CCNC: 32 U/L (ref 0–40)
BILIRUB SERPL-MCNC: 0.5 MG/DL (ref 0–1)
BUN SERPL-MCNC: 19 MG/DL (ref 8–22)
CALCIUM SERPL-MCNC: 10.2 MG/DL (ref 8.5–10.5)
CHLORIDE BLD-SCNC: 108 MMOL/L (ref 98–107)
CHOLEST SERPL-MCNC: 162 MG/DL
CO2 SERPL-SCNC: 27 MMOL/L (ref 22–31)
CREAT SERPL-MCNC: 0.86 MG/DL (ref 0.6–1.1)
ERYTHROCYTE [DISTWIDTH] IN BLOOD BY AUTOMATED COUNT: 13.6 % (ref 10–15)
FASTING STATUS PATIENT QL REPORTED: YES
GFR SERPL CREATININE-BSD FRML MDRD: 73 ML/MIN/1.73M2
GLUCOSE BLD-MCNC: 108 MG/DL (ref 70–125)
HCT VFR BLD AUTO: 39.6 % (ref 35–47)
HDLC SERPL-MCNC: 50 MG/DL
HGB BLD-MCNC: 13 G/DL (ref 11.7–15.7)
LDLC SERPL CALC-MCNC: 86 MG/DL
MCH RBC QN AUTO: 28.7 PG (ref 26.5–33)
MCHC RBC AUTO-ENTMCNC: 32.8 G/DL (ref 31.5–36.5)
MCV RBC AUTO: 87 FL (ref 78–100)
PLATELET # BLD AUTO: 325 10E3/UL (ref 150–450)
POTASSIUM BLD-SCNC: 4.6 MMOL/L (ref 3.5–5)
PROT SERPL-MCNC: 7.6 G/DL (ref 6–8)
RBC # BLD AUTO: 4.53 10E6/UL (ref 3.8–5.2)
SODIUM SERPL-SCNC: 143 MMOL/L (ref 136–145)
TRIGL SERPL-MCNC: 130 MG/DL
VIT B12 SERPL-MCNC: 938 PG/ML (ref 213–816)
WBC # BLD AUTO: 6.7 10E3/UL (ref 4–11)

## 2022-05-26 PROCEDURE — 82306 VITAMIN D 25 HYDROXY: CPT | Performed by: INTERNAL MEDICINE

## 2022-05-26 PROCEDURE — 80061 LIPID PANEL: CPT | Performed by: INTERNAL MEDICINE

## 2022-05-26 PROCEDURE — 85027 COMPLETE CBC AUTOMATED: CPT | Performed by: INTERNAL MEDICINE

## 2022-05-26 PROCEDURE — 36415 COLL VENOUS BLD VENIPUNCTURE: CPT | Performed by: INTERNAL MEDICINE

## 2022-05-26 PROCEDURE — 86803 HEPATITIS C AB TEST: CPT | Performed by: INTERNAL MEDICINE

## 2022-05-26 PROCEDURE — 82607 VITAMIN B-12: CPT | Performed by: INTERNAL MEDICINE

## 2022-05-26 PROCEDURE — 80053 COMPREHEN METABOLIC PANEL: CPT | Performed by: INTERNAL MEDICINE

## 2022-05-26 PROCEDURE — 99397 PER PM REEVAL EST PAT 65+ YR: CPT | Performed by: INTERNAL MEDICINE

## 2022-05-26 ASSESSMENT — ACTIVITIES OF DAILY LIVING (ADL): CURRENT_FUNCTION: NO ASSISTANCE NEEDED

## 2022-05-26 NOTE — PATIENT INSTRUCTIONS
Patient Education   Personalized Prevention Plan  You are due for the preventive services outlined below.  Your care team is available to assist you in scheduling these services.  If you have already completed any of these items, please share that information with your care team to update in your medical record.  Health Maintenance Due   Topic Date Due     ANNUAL REVIEW OF HM ORDERS  Never done     Hepatitis C Screening  Never done     LUNG CANCER SCREENING  07/12/2017       Exercise for a Healthier Heart  You may wonder how you can improve the health of your heart. If you re thinking about exercise, you re on the right track. You don t need to become an athlete. But you do need a certain amount of brisk exercise to help strengthen your heart. If you have been diagnosed with a heart condition, your healthcare provider may advise exercise to help stabilize your condition. To help make exercise a habit, choose safe, fun activities.      Exercise with a friend. When activity is fun, you're more likely to stick with it.   Before you start  Check with your healthcare provider before starting an exercise program. This is especially important if you have not been active for a while. It's also important if you have a long-term (chronic) health problem such as heart disease, diabetes, or obesity. Or if you are at high risk for having these problems.   Why exercise?  Exercising regularly offers many healthy rewards. It can help you do all of the following:     Improve your blood cholesterol level to help prevent further heart trouble    Lower your blood pressure to help prevent a stroke or heart attack    Control diabetes, or reduce your risk of getting this disease    Improve your heart and lung function    Reach and stay at a healthy weight    Make your muscles stronger so you can stay active    Prevent falls and fractures by slowing the loss of bone mass (osteoporosis)    Manage stress better    Reduce your blood  pressure    Improve your sense of self and your body image  Exercise tips      Ease into your routine. Set small goals. Then build on them. If you are not sure what your activity level should be, talk with your healthcare provider first before starting an exercise routine.    Exercise on most days. Aim for a total of 150 minutes (2 hours and 30 minutes) or more of moderate-intensity aerobic activity each week. Or 75 minutes (1 hour and 15 minutes) or more of vigorous-intensity aerobic activity each week. Or try for a combination of both. Moderate activity means that you breathe heavier and your heart rate increases but you can still talk. Think about doing 40 minutes of moderate exercise, 3 to 4 times a week. For best results, activity should last for about 40 minutes to lower blood pressure and cholesterol. It's OK to work up to the 40-minute period over time. Examples of moderate-intensity activity are walking 1 mile in 15 minutes. Or doing 30 to 45 minutes of yard work.    Step up your daily activity level.  Along with your exercise program, try being more active the whole day. Walk instead of drive. Or park further away so that you take more steps each day. Do more household tasks or yard work. You may not be able to meet the advised mount of physical activity. But doing some moderate- or vigorous-intensity aerobic activity can help reduce your risk for heart disease. Your healthcare provider can help you figure out what is best for you.    Choose 1 or more activities you enjoy.  Walking is one of the easiest things you can do. You can also try swimming, riding a bike, dancing, or taking an exercise class.    When to call your healthcare provider  Call your healthcare provider if you have any of these:     Chest pain or feel dizzy or lightheaded    Burning, tightness, pressure, or heaviness in your chest, neck, shoulders, back, or arms    Abnormal shortness of breath    More joint or muscle pain    A very fast  or irregular heartbeat (palpitations)  [id number] last reviewed this educational content on 7/1/2019 2000-2021 [employer]. All rights reserved. This information is not intended as a substitute for professional medical care. Always follow your healthcare professional's instructions.          Understanding USDA MyPlate  The USDA has guidelines to help you make healthy food choices. These are called MyPlate. MyPlate shows the food groups that make up healthy meals using the image of a place setting. Before you eat, think about the healthiest choices for what to put on your plate or in your cup or bowl. To learn more about building a healthy plate, visit www.choosemyplate.gov.    The food groups    Fruits. Any fruit or 100% fruit juice counts as part of the Fruit Group. Fruits may be fresh, canned, frozen, or dried, and may be whole, cut-up, or pureed. Make 1/2 of your plate fruits and vegetables.    Vegetables. Any vegetable or 100% vegetable juice counts as a member of the Vegetable Group. Vegetables may be fresh, frozen, canned, or dried. They can be served raw or cooked and may be whole, cut-up, or mashed. Make 1/2 of your plate fruits and vegetables.    Grains. All foods made from grains are part of the Grains Group. These include wheat, rice, oats, cornmeal, and barley. Grains are often used to make foods such as bread, pasta, oatmeal, cereal, tortillas, and grits. Grains should be no more than 1/4 of your plate. At least half of your grains should be whole grains.    Protein. This group includes meat, poultry, seafood, beans and peas, eggs, processed soy products (such as tofu), nuts (including nut butters), and seeds. Make protein choices no more than 1/4 of your plate. Meat and poultry choices should be lean or low fat.    Dairy. The Dairy Group includes all fluid milk products and foods made from milk that contain calcium, such as yogurt and cheese. (Foods that have little calcium, such as  cream, butter, and cream cheese, are not part of this group.) Most dairy choices should be low-fat or fat-free.    Oils. Oils aren't a food group, but they do contain essential nutrients. However it's important to watch your intake of oils. These are fats that are liquid at room temperature. They include canola, corn, olive, soybean, vegetable, and sunflower oil. Foods that are mainly oil include mayonnaise, certain salad dressings, and soft margarines. You likely already get your daily oil allowance from the foods you eat.  Things to limit  Eating healthy also means limiting these things in your diet:       Salt (sodium). Many processed foods have a lot of sodium. To keep sodium intake down, eat fresh vegetables, meats, poultry, and seafood when possible. Purchase low-sodium, reduced-sodium, or no-salt-added food products at the store. And don't add salt to your meals at home. Instead, season them with herbs and spices such as dill, oregano, cumin, and paprika. Or try adding flavor with lemon or lime zest and juice.    Saturated fat. Saturated fats are most often found in animal products such as beef, pork, and chicken. They are often solid at room temperature, such as butter. To reduce your saturated fat intake, choose leaner cuts of meat and poultry. And try healthier cooking methods such as grilling, broiling, roasting, or baking. For a simple lower-fat swap, use plain nonfat yogurt instead of mayonnaise when making potato salad or macaroni salad.    Added sugars. These are sugars added to foods. They are in foods such as ice cream, candy, soda, fruit drinks, sports drinks, energy drinks, cookies, pastries, jams, and syrups. Cut down on added sugars by sharing sweet treats with a family member or friend. You can also choose fruit for dessert, and drink water or other unsweetened beverages.     [employer] last reviewed this educational content on 6/1/2020 2000-2021 [employer]. All rights reserved.  This information is not intended as a substitute for professional medical care. Always follow your healthcare professional's instructions.

## 2022-05-26 NOTE — LETTER
May 29, 2022      Raj Kim  [address] [place] [address]        Dear ,    We are writing to inform you of your test results.    Your lab tests are all good, including the vitamin D and B12 levels.   The minor abnormalities are not significant.      Resulted Orders   Hepatitis C Screen Reflex to HCV RNA Quant and Genotype   Result Value Ref Range    Hepatitis C Antibody Nonreactive Nonreactive    Narrative    Assay performance characteristics have not been established for newborns, infants, and children.   Vitamin D deficiency screening   Result Value Ref Range    Vitamin D, Total (25-Hydroxy) 67 20 - 75 ug/L    Narrative    Season, race, dietary intake, and treatment affect the concentration of 25-hydroxy-Vitamin D. Values may decrease during winter months and increase during summer months. Values 20-29 ug/L may indicate Vitamin D insufficiency and values <20 ug/L may indicate Vitamin D deficiency.    Vitamin D determination is routinely performed by an immunoassay specific for 25 hydroxyvitamin D3.  If an individual is on vitamin D2(ergocalciferol) supplementation, please specify 25 OH vitamin D2 and D3 level determination by LCMSMS test [id number].     CBC with platelets   Result Value Ref Range    WBC Count 6.7 4.0 - 11.0 10e3/uL    RBC Count 4.53 3.80 - 5.20 10e6/uL    Hemoglobin 13.0 11.7 - 15.7 g/dL    Hematocrit 39.6 35.0 - 47.0 %    MCV 87 78 - 100 fL    MCH 28.7 26.5 - 33.0 pg    MCHC 32.8 31.5 - 36.5 g/dL    RDW 13.6 10.0 - 15.0 %    Platelet Count 325 150 - 450 10e3/uL   Comprehensive metabolic panel   Result Value Ref Range    Sodium 143 136 - 145 mmol/L    Potassium 4.6 3.5 - 5.0 mmol/L    Chloride 108 (H) 98 - 107 mmol/L    Carbon Dioxide (CO2) 27 22 - 31 mmol/L    Anion Gap 8 5 - 18 mmol/L    Urea Nitrogen 19 8 - 22 mg/dL    Creatinine 0.86 0.60 - 1.10 mg/dL    Calcium 10.2 8.5 - 10.5 mg/dL    Glucose 108 70 - 125 mg/dL    Alkaline Phosphatase 61 45 - 120 U/L    AST 32 0 - 40 U/L     ALT 30 0 - 45 U/L    Protein Total 7.6 6.0 - 8.0 g/dL    Albumin 3.9 3.5 - 5.0 g/dL    Bilirubin Total 0.5 0.0 - 1.0 mg/dL    GFR Estimate 73 >60 mL/min/1.73m2      Comment:      Effective December 21, 2021 eGFRcr in adults is calculated using the 2021 CKD-EPI creatinine equation which includes age and gender (Lisa et al., NEJ, DOI: 10.1056/VAFYom0374377)   Lipid Profile (Chol, Trig, HDL, LDL calc)   Result Value Ref Range    Cholesterol 162 <=199 mg/dL    Triglycerides 130 <=149 mg/dL    Direct Measure HDL 50 >=50 mg/dL      Comment:      HDL Cholesterol Reference Range:     0-2 years:   No reference ranges established for patients under 2 years old  at [facility] for lipid analytes.    2-8 years:  Greater than 45 mg/dL     18 years and older:   Female: Greater than or equal to 50 mg/dL   Male:   Greater than or equal to 40 mg/dL    LDL Cholesterol Calculated 86 <=129 mg/dL    Patient Fasting > 8hrs? Yes    Vitamin B12   Result Value Ref Range    Vitamin B12 938 (H) 213 - 816 pg/mL       If you have any questions or concerns, please call the clinic at the number listed above.       Sincerely,      Lorenzo Dunn MD

## 2022-05-26 NOTE — PROGRESS NOTES
"   SUBJECTIVE:   CC: Raj Kim is an 71 year old woman who presents for preventive health visit.     HPI;  Seeing vascular surgery about how long to take the anticoagulation.  No symptoms of bleeding. Has stopped her estrogen.  Low vitamin D in the past.  Taking replacement.  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  Has chronic rare, episodic rectal incontinence, from her radiation colitis, unchanged in frequency or severity. Overall doing well.  History of ovarian cancer.      Patient has been advised of split billing requirements and indicates understanding: Yes  Healthy Habits:     In general, how would you rate your overall health?  Good    Frequency of exercise:  None    Do you usually eat at least 4 servings of fruit and vegetables a day, include whole grains    & fiber and avoid regularly eating high fat or \"junk\" foods?  No    Taking medications regularly:  Yes    Medication side effects:  None    Ability to successfully perform activities of daily living:  No assistance needed    Home Safety:  No safety concerns identified    Hearing Impairment:  No hearing concerns    In the past 6 months, have you been bothered by leaking of urine?  No    In general, how would you rate your overall mental or emotional health?  Excellent      PHQ-2 Total Score: 0    Additional concerns today:  Yes    Today's PHQ-2 Score:   PHQ-2 ( 1999 [employer]) 5/23/2022   Q1: Little interest or pleasure in doing things 0   Q2: Feeling down, depressed or hopeless 0   PHQ-2 Score 0   PHQ-2 Total Score (12-17 Years)- Positive if 3 or more points; Administer PHQ-A if positive -   Q1: Little interest or pleasure in doing things Not at all   Q2: Feeling down, depressed or hopeless Not at all   PHQ-2 Score 0     Do you feel safe in your environment? Yes    Social History     Tobacco Use     Smoking status: Former Smoker     Smokeless tobacco: Never Used   Substance Use Topics     Alcohol use: Yes     Comment: Alcoholic " "Drinks/day: occasional     Alcohol Use 5/23/2022   Prescreen: >3 drinks/day or >7 drinks/week? No     Reviewed orders with patient.  Reviewed health maintenance and updated orders accordingly - Yes    Breast Cancer Screening:    FHS-7:   Breast CA Risk Assessment (FHS-7) 5/23/2022   Did any of your first-degree relatives have breast or ovarian cancer? Yes   Did any of your relatives have bilateral breast cancer? Unknown   Did any man in your family have breast cancer? No   Did any woman in your family have breast and ovarian cancer? Yes   Did any woman in your family have breast cancer before age 50 y? Unknown   Do you have 2 or more relatives with breast and/or ovarian cancer? No   Do you have 2 or more relatives with breast and/or bowel cancer? No     Reviewed and updated as needed this visit by clinical staff     Meds           Reviewed and updated as needed this visit by Provider     Past Medical History:   Diagnosis Date     Acute deep vein thrombosis (DVT) of axillary vein (H)      Fecal incontinence 07/27/2020     Hormone replacement therapy      Hx of radiation therapy      Osteopenia of multiple sites 05/02/2019     Ovarian cancer (H) 01/01/1980     Peripheral neuropathy, secondary to drugs or chemicals      Radiation colitis      Sensorineural hearing loss, bilateral 05/02/2019     Vitamin B 12 deficiency       Past Surgical History:   Procedure Laterality Date     FINGER SURGERY Right 01/01/2016    Dr. [facility]     HYSTERECTOMY  01/01/1980     LAPAROSCOPIC CHOLECYSTECTOMY       LAPAROTOMY EXPLORATORY      ovary/omentum abnormalities     OOPHORECTOMY Bilateral 01/01/1980     Review of Systems  See HPI     OBJECTIVE:     PHYSICAL EXAM:  General Appearance: In no acute distress  /64 (BP Location: Left arm, Patient Position: Sitting, Cuff Size: Adult Regular)   Pulse 86   Temp 97.6  F (36.4  C) (Axillary)   Resp 16   Ht 1.651 m (5' 5\")   Wt 60.5 kg (133 lb 4.8 oz)   SpO2 98%   BMI " "22.18 kg/m    NECK:  without cervical or axillary adenopathy  CHEST: mild kyphosis  RESPIRATORY: Clear   CARDIOVASCULAR: S1, S2  ABDOMEN: soft, flat, and non-tender  EXTREMITIES: No joint swelling, no ulcer or edema  NEUROLOGIC: No arm or leg  weakness, speech is clear, gait is normal  PSYCHIATRIC: Oriented X 3, without confusion, behavior and affect normal, thinking is clear    ASSESSMENT/PLAN:     Raj was seen today for physical and recheck medication.    Diagnoses and all orders for this visit:    Leg cramps  She can try magnesium supplement     Hyperlipidemia LDL goal <100  -     CBC with platelets  -     Comprehensive metabolic panel  -     Lipid Profile (Chol, Trig, HDL, LDL calc)    Vitamin B12 deficiency (non anemic)  -     Vitamin B12    Vitamin D deficiency  -     Vitamin D deficiency screening    Visit for screening mammogram  -     *MA Screening Digital Bilateral; Future    Screening for colon cancer  Colonoscopy 6/2021.  Due in 6/2024    Osteopenia  Continue exercise and calcium with vitamin D    COUNSELING:  Reviewed preventive health counseling, as reflected in patient instructions       Healthy diet/nutrition       Vision screening       (Natasha)menopause management    Estimated body mass index is 23.13 kg/m  as calculated from the following:    Height as of 7/27/20: 1.651 m (5' 5\").    Weight as of 12/24/21: 63 kg (139 lb).    She reports that she has quit smoking. She has never used smokeless tobacco.    Lorenzo Dunn MD  [facility]  "

## 2022-05-27 LAB
DEPRECATED CALCIDIOL+CALCIFEROL SERPL-MC: 67 UG/L (ref 20–75)
HCV AB SERPL QL IA: NONREACTIVE

## 2022-06-02 NOTE — PROGRESS NOTES
Fall risk  Fallen 2 or more times in the past year?: No  Any fall with injury in the past year?: No  click delete button to remove this line now  Cognitive Screening   1) Repeat 3 items (Leader, Season, Table)    2) Clock draw: NORMAL  3) 3 item recall: Recalls 3 objects  Results: 3 items recalled: COGNITIVE IMPAIRMENT LESS LIKELY

## 2022-06-07 ASSESSMENT — ACTIVITIES OF DAILY LIVING (ADL): CURRENT_FUNCTION: NO ASSISTANCE NEEDED

## 2022-06-07 NOTE — PROGRESS NOTES
"SUBJECTIVE:   Raj Kim is a 71 year old female who presents for Preventive Visit.      Patient has been advised of split billing requirements and indicates understanding: Yes  Are you in the first 12 months of your Medicare coverage?  No    Healthy Habits:     In general, how would you rate your overall health?  Good    Frequency of exercise:  None    Do you usually eat at least 4 servings of fruit and vegetables a day, include whole grains    & fiber and avoid regularly eating high fat or \"junk\" foods?  No    Taking medications regularly:  Yes    Barriers to taking medications:  None    Medication side effects:  None    Ability to successfully perform activities of daily living:  No assistance needed    Home Safety:  No safety concerns identified    Hearing Impairment:  No hearing concerns    In the past 6 months, have you been bothered by leaking of urine?  No    In general, how would you rate your overall mental or emotional health?  Excellent      PHQ-2 Total Score: 0    Additional concerns today:  Yes    Do you feel safe in your environment? Yes    Have you ever done Advance Care Planning? (For example, a Health Directive, POLST, or a discussion with a medical provider or your loved ones about your wishes): Yes, advance care planning is on file.       Fall risk  Fallen 2 or more times in the past year?: No  Any fall with injury in the past year?: No    Cognitive Screening   1) Repeat 3 items (Leader, Season, Table)    2) Clock draw: NORMAL  3) 3 item recall: Recalls 3 objects  Results: 3 items recalled: COGNITIVE IMPAIRMENT LESS LIKELY    Mini-CogTM Copyright S Saurabh. Licensed by the author for use in [facility]; reprinted with permission (roro@.[facility]). All rights reserved.          Reviewed and updated as needed this visit by clinical staff   Tobacco  Allergies  Meds  Problems  Med Hx  Surg Hx  Fam Hx            Reviewed and updated as needed this visit by Provider    Allergies  " "Meds  Problems  Med Hx  Surg Hx  Fam Hx           Social History     Tobacco Use     Smoking status: Former Smoker     Smokeless tobacco: Never Used   Substance Use Topics     Alcohol use: Yes     Comment: Alcoholic Drinks/day: occasional     If you drink alcohol do you typically have >3 drinks per day or >7 drinks per week? No    Alcohol Use 5/23/2022   Prescreen: >3 drinks/day or >7 drinks/week? No   No flowsheet data found.            Current providers sharing in care for this patient include:   Patient Care Team:  Lorenzo Dunn MD as PCP - General  Marlin Harley MD as Assigned Surgical Provider  Lorenzo Dunn MD as Assigned PCP    The following health maintenance items are reviewed in Epic and correct as of today:  Health Maintenance Due   Topic Date Due     ANNUAL REVIEW OF  ORDERS  Never done     LUNG CANCER SCREENING  07/12/2017           FHS-7:   Breast CA Risk Assessment (FHS-7) 5/23/2022   Did any of your first-degree relatives have breast or ovarian cancer? Yes   Did any of your relatives have bilateral breast cancer? Unknown   Did any man in your family have breast cancer? No   Did any woman in your family have breast and ovarian cancer? Yes   Did any woman in your family have breast cancer before age 50 y? Unknown   Do you have 2 or more relatives with breast and/or ovarian cancer? No   Do you have 2 or more relatives with breast and/or bowel cancer? No       Mammogram Screening: Recommended mammography every 1-2 years with patient discussion and risk factor consideration  Pertinent mammograms are reviewed under the imaging tab.    Review of Systems      OBJECTIVE:   /64 (BP Location: Left arm, Patient Position: Sitting, Cuff Size: Adult Regular)   Pulse 86   Temp 97.6  F (36.4  C) (Axillary)   Resp 16   Ht 1.651 m (5' 5\")   Wt 60.5 kg (133 lb 4.8 oz)   SpO2 98%   BMI 22.18 kg/m   Estimated body mass index is 22.18 kg/m  as calculated from the following:    Height " "as of this encounter: 1.651 m (5' 5\").    Weight as of this encounter: 60.5 kg (133 lb 4.8 oz).  Physical Exam      ASSESSMENT / PLAN:       Estimated body mass index is 22.18 kg/m  as calculated from the following:    Height as of this encounter: 1.651 m (5' 5\").    Weight as of this encounter: 60.5 kg (133 lb 4.8 oz).        She reports that she has quit smoking. She has never used smokeless tobacco.      Appropriate preventive services were discussed with this patient, including applicable screening as appropriate for cardiovascular disease, diabetes, osteopenia/osteoporosis, and glaucoma.  As appropriate for age/gender, discussed screening for colorectal cancer, prostate cancer, breast cancer, and cervical cancer. Checklist reviewing preventive services available has been given to the patient.    Reviewed patients plan of care and provided an AVS. The Basic Care Plan (routine screening as documented in Health Maintenance) for Raj meets the Care Plan requirement. This Care Plan has been established and reviewed with the Patient.    Counseling Resources:  ATP IV Guidelines  Pooled Cohorts Equation Calculator  Breast Cancer Risk Calculator  Breast Cancer: Medication to Reduce Risk  FRAX Risk Assessment  ICSI Preventive Guidelines  Dietary Guidelines for Americans, 2010  [employer]'s MyPlate  ASA Prophylaxis  Lung CA Screening    Lorenzo Dunn MD  [facility]    Identified Health Risks:  "

## 2022-06-16 DIAGNOSIS — I82.A11 ACUTE DEEP VEIN THROMBOSIS (DVT) OF AXILLARY VEIN OF RIGHT UPPER EXTREMITY (H): ICD-10-CM

## 2022-06-17 NOTE — TELEPHONE ENCOUNTER
Routing refill request to provider for review/approval because:  Labs not current:  Creatinine clearance    Last Written Prescription Date:  12/24/21  Last Fill Quantity: 90,  # refills: 1   Last office visit provider:  5/26/22     Requested Prescriptions   Pending Prescriptions Disp Refills     XARELTO ANTICOAGULANT 20 MG TABS tablet [Pharmacy Med Name: XARELTO 20 MG TABLET 20 Tablet] 90 tablet 1     Sig: TAKE 1 TABLET (20MG) BY MOUTH DAILY WITH DINNER.       Direct Oral Anticoagulant Agents Failed - 6/16/2022  8:57 AM        Failed - Creatinine Clearance greater than 50 ml/min on file in past 3 mos     No lab results found.          Passed - Normal Platelets on file in past 12 months     Recent Labs   Lab Test 05/26/22  1004                  Passed - Medication is active on med list        Passed - Serum creatinine less than or equal to 1.4 on file in past 3 mos     Recent Labs   Lab Test 05/26/22  1004   CR 0.86       Ok to refill medication if creatinine is low          Passed - Patient is 18 years of age or older        Passed - No active pregnancy on record        Passed - No positive pregnancy test within past 12 months        Passed - Recent (6 mo) or future (30 days) visit within the authorizing provider's specialty             Juliet Mccurdy RN 06/17/22 1:10 AM

## 2022-06-20 RX ORDER — RIVAROXABAN 20 MG/1
TABLET, FILM COATED ORAL
Qty: 90 TABLET | Refills: 1 | Status: SHIPPED | OUTPATIENT
Start: 2022-06-20 | End: 2023-01-12

## 2022-06-28 ENCOUNTER — OFFICE VISIT (OUTPATIENT)
Dept: VASCULAR SURGERY | Facility: CLINIC | Age: 72
End: 2022-06-28
Attending: RADIOLOGY
Payer: COMMERCIAL

## 2022-06-28 ENCOUNTER — ANCILLARY PROCEDURE (OUTPATIENT)
Dept: VASCULAR ULTRASOUND | Facility: CLINIC | Age: 72
End: 2022-06-28
Attending: RADIOLOGY
Payer: COMMERCIAL

## 2022-06-28 VITALS
DIASTOLIC BLOOD PRESSURE: 72 MMHG | BODY MASS INDEX: 21.66 KG/M2 | HEIGHT: 65 IN | RESPIRATION RATE: 12 BRPM | SYSTOLIC BLOOD PRESSURE: 122 MMHG | WEIGHT: 130 LBS | TEMPERATURE: 98.2 F | HEART RATE: 76 BPM

## 2022-06-28 DIAGNOSIS — I82.A11 ACUTE DEEP VEIN THROMBOSIS (DVT) OF AXILLARY VEIN OF RIGHT UPPER EXTREMITY (H): ICD-10-CM

## 2022-06-28 DIAGNOSIS — I82.A11 ACUTE DEEP VEIN THROMBOSIS (DVT) OF AXILLARY VEIN OF RIGHT UPPER EXTREMITY (H): Primary | ICD-10-CM

## 2022-06-28 PROCEDURE — 93971 EXTREMITY STUDY: CPT | Mod: RT

## 2022-06-28 RX ORDER — CALCIUM CARBONATE 300MG(750)
TABLET,CHEWABLE ORAL DAILY
COMMUNITY

## 2022-06-28 ASSESSMENT — PAIN SCALES - GENERAL: PAINLEVEL: MILD PAIN (2)

## 2022-06-28 NOTE — PATIENT INSTRUCTIONS
Moises Kim ,    Thank you for entrusting your care with us today. After your visit today with dr. Jimenez this is the plan that was discussed at your appointment.    Follow up in 1 year with repeat imaging.    I am including additional information on these things and our contact information if you have any questions or concerns.   Please do not hesitate to reach out to us if you felt we did not answer your questions or you are unsure of the treatment plan after your visit today. Our number is [phone].Thank you for trusting us with your care.         Again thank you for your time.     SAMANTHA TIAN

## 2022-06-28 NOTE — PROGRESS NOTES
"[facility]        Patient is here for a 3 month follow up  to discuss right upper extremity deep vein thrombosis. US completed prior to visit. Pt c/o new aching in the right arm that started about 1 month ago and is constant.     Pt is currently taking Xarelto.    /72   Pulse 76   Temp 98.2  F (36.8  C) (Oral)   Resp 12   Ht 5' 5\" (1.651 m)   Wt 130 lb (59 kg)   BMI 21.63 kg/m      The provider has been notified that the patient has concerns of new right arm aching.     Questions patient would like addressed today are: She has blood in her stool that is black and occasionally red. She was told she has internal hemorrhoids but wondering if is could be from her taking Xarelto because it is more noticeable.    Refills are needed: No    Has homecare services and agency name:  Kaleigh ERWIN RN      "

## 2022-06-29 NOTE — PROGRESS NOTES
VASCULAR AND INTERVENTIONAL OUTPATIENT CONSULT OR VISIT  PHYSICIAN: Adi Jimenez MD  ESTABLISHED PATIENT    LOCATION: [facility] [place]    Raj Kim   Medical Record #:  [id number]  YOB: 1950  Age:  71 year old     Date of Service: 6/28/2022    PRIMARY CARE PROVIDER: Lorenzo Dunn    Reason for visit: Right upper extremity deep vein thrombosis     IMPRESSION: 71-year-old female with first lifetime episode of venous thromboembolism with extensive right upper extremity deep vein thrombosis diagnosed on ultrasound study dated 12/22/2021.  Her identified risk factors at that time include estrogen replacement therapy as well as receiving 3 Covid vaccinations.   Estrogen replacement therapy has been discontinued.  She is asymptomatic despise the diffuse involvement of right upper extremity veins.  She has been tolerating anticoagulation with Xarelto without any major adverse events.  Repeat ultrasound performed today demonstrates interval improvement now with recanalization of the basilic vein.  Unfortunately, there is persistent thrombus in the internal jugular, subclavian and axillary veins.     RECOMMENDATION:    1.  Continue anticoagulation with Xarelto.  The patient has completed approximately 6 months of anticoagulation.  The risks, benefits and alternatives of discontinuing versus continuing anticoagulation reviewed in detail with the patient.  We will continue anticoagulation for another 3 months and reevaluate with an ultrasound.  If there is no significant change then we will discontinue Xarelto.  2.  Obtain repeat right upper extremity venous ultrasound in 3 months to evaluate progress  3.  The patient does report occasional blood-streaked stools, I have instructed her to follow-up with her gastroenterologist for further evaluation.     Plan for a 3-month clinic follow-up/US to evaluate response.     HPI:  Raj Kim is a 71 year old female who was seen today in follow up for  recently diagnosed right upper extremity deep vein thrombosis.  The patient reports she incidentally noted a palpable abnormality in her right upper extremity which prompted a right upper extremity venous duplex.  The ultrasound was performed at the [facility] in [place] on 10/22/2021. Imaging at that time demonstrated significant thrombus involving the right brachial, cephalic, basilic, axillary, subclavian and internal jugular veins.  She was subsequently started on anticoagulation with Xarelto.  The patient reports occasional episodes of blood-streaked stools as well as small blood clots during defecation.  No recent falls identified.  The patient remains asymptomatic in her right upper extremity.  No lower extremity pain or swelling.  No shortness of breath, dyspnea or chest pain.  The patient has discontinued her estrogen replacement therapy.       PHH:    Past Medical History:   Diagnosis Date     Acute deep vein thrombosis (DVT) of axillary vein (H)      Fecal incontinence 07/27/2020     Hormone replacement therapy      Hx of radiation therapy      Osteopenia of multiple sites 05/02/2019     Ovarian cancer (H) 01/01/1980     Peripheral neuropathy, secondary to drugs or chemicals      Radiation colitis      Sensorineural hearing loss, bilateral 05/02/2019     Vitamin B 12 deficiency         Past Surgical History:   Procedure Laterality Date     FINGER SURGERY Right 01/01/2016    Dr. Jannet Fletcher     HYSTERECTOMY  01/01/1980     LAPAROSCOPIC CHOLECYSTECTOMY       LAPAROTOMY EXPLORATORY      ovary/omentum abnormalities     OOPHORECTOMY Bilateral 01/01/1980       ALLERGIES:  Celecoxib, Cephalosporins, Hydrocodone-acetaminophen, and Penicillins    MEDS:    Current Outpatient Medications:      betamethasone dipropionate (DIPROLENE) 0.05 % ointment, Apply topically as needed, Disp: , Rfl:      calcium citrate-vitamin D3 (CITRACAL + D) 315-250 mg-unit per tablet, [CALCIUM CITRATE-VITAMIN D3 (CITRACAL + D)  315-250 MG-UNIT PER TABLET] Take 1 tablet by mouth daily., Disp: , Rfl:      cholecalciferol, vitamin D3, (VITAMIN D3) 2,000 unit capsule, [CHOLECALCIFEROL, VITAMIN D3, (VITAMIN D3) 2,000 UNIT CAPSULE] Take 5,000 Units by mouth daily.       , Disp: , Rfl:      cyanocobalamin, vitamin B-12, 1,000 mcg Subl, [CYANOCOBALAMIN, VITAMIN B-12, 1,000 MCG SUBL] Place 1,000 mcg under the tongue daily., Disp: , Rfl:      desonide (DESOWEN) 0.05 % cream, Apply topically as needed, Disp: , Rfl:      GABAPENTIN, ONCE-DAILY, PO, Take 200 mg by mouth daily, Disp: , Rfl:      Lactobacillus rhamnosus GG (CULTURELLE) 10-15 Billion cell capsule, [LACTOBACILLUS RHAMNOSUS GG (CULTURELLE) 10-15 BILLION CELL CAPSULE] Take 1 capsule by mouth daily., Disp: , Rfl:      Magnesium 400 MG TABS, Take by mouth daily, Disp: , Rfl:      Misc Natural Products (TART CHERRY ADVANCED PO), , Disp: , Rfl:      multivitamin with minerals (THERA-M) 9 mg iron-400 mcg Tab tablet, [MULTIVITAMIN WITH MINERALS (THERA-M) 9 MG IRON-400 MCG TAB TABLET] Take 1 tablet by mouth daily., Disp: , Rfl:      psyllium with dextrose (METAMUCIL JAR) powder, [PSYLLIUM WITH DEXTROSE (METAMUCIL JAR) POWDER] Take by mouth 3 (three) times a day. 1 tsp daily, Disp: , Rfl:      valACYclovir (VALTREX) 500 MG tablet, Take 500 mg by mouth as needed, Disp: , Rfl:      XARELTO ANTICOAGULANT 20 MG TABS tablet, TAKE 1 TABLET (20MG) BY MOUTH DAILY WITH DINNER., Disp: 90 tablet, Rfl: 1    SOCIAL HABITS:    History   Smoking Status     Former Smoker   Smokeless Tobacco     Never Used     Social History    Substance and Sexual Activity      Alcohol use: Yes        Comment: Alcoholic Drinks/day: occasional      History   Drug Use No       FAMILY HISTORY:    Family History   Problem Relation Age of Onset     Diabetes Father      Bladder Cancer Sister      Breast Cancer Maternal Grandmother      Colon Cancer Maternal Aunt      Breast Cancer Maternal Aunt        ADVANCE CARE DIRECTIVES:    Advance  "care directives reviewed in the chart and no changes made.     PE:  /72   Pulse 76   Temp 98.2  F (36.8  C) (Oral)   Resp 12   Ht 1.651 m (5' 5\")   Wt 59 kg (130 lb)   BMI 21.63 kg/m    Wt Readings from Last 1 Encounters:   06/28/22 59 kg (130 lb)     Body mass index is 21.63 kg/m .    EXAM:  GENERAL: This is a well-developed 71 year old female who appears her stated age  EYES: Grossly normal.  MOUTH: Buccal mucosa normal   MUSCULOSKELETAL: Grossly normal and both lower extremities are intact.  HEME/LYMPH: No lymphedema  NEUROLOGIC: Focally intact, Alert and oriented x 3.   PSYCH: appropriate affect  INTEGUMENT: No open lesions or ulcers    DIAGNOSTIC STUDIES:     Images:  US Upper Extremity Venous Duplex Right    Result Date: 6/28/2022  RIGHT Venous Ultrasound Worksheet (Date: 06/28/22) RIGHT Upper Extremities Indication:  History of extensive right internal jugular, subclavian, axillary, and brachial DVT, Right arm basilic SVT/patient on anticoagulants.  Right upper extremity deep vein thrombosis/swelling/pain   Previous: 3/29/22   Patient History Includes: DVT and Anticoagulants Right  Arm Location  IJV SUBC.V  MOIZ. V BRV. MID RAD.V  ULN. V. CEPH. V BAS. V Compressibility (FC,PC,NC) NC PC NC FC FC FC FC FC Spontaneous - + + -     Phasic  + + + -     Augmentation + + + +     Patent + + + +     Impression: 1. Occlusive, chronic deep vein thrombosis involving the internal jugular and axillary veins.  2. Partially occlusive, chronic deep vein thrombosis involving the subclavian vein.  3. Interval recanalization of the basilic vein. Reference: Compressibility: FC= Fully compressible, PC= Partially compressible, NC= Non-compressible, NV= Not Visualized Venous Doppler: (+) = Present  (0) = Absent  (-) = Decreased/Unable to Evaluate, (NV) = Not Visualized      LABS:      Sodium   Date Value Ref Range Status   05/26/2022 143 136 - 145 mmol/L Final   07/28/2021 140 136 - 145 mmol/L Final   07/27/2020 141 136 - " 145 mmol/L Final     Urea Nitrogen   Date Value Ref Range Status   05/26/2022 19 8 - 22 mg/dL Final   07/28/2021 17 8 - 22 mg/dL Final   07/27/2020 14 8 - 22 mg/dL Final     Hemoglobin   Date Value Ref Range Status   05/26/2022 13.0 11.7 - 15.7 g/dL Final   07/28/2021 13.0 11.7 - 15.7 g/dL Final   07/27/2020 14.6 12.0 - 16.0 g/dL Final     Platelet Count   Date Value Ref Range Status   05/26/2022 325 150 - 450 10e3/uL Final   07/28/2021 270 150 - 450 10e3/uL Final   07/27/2020 305 140 - 440 thou/uL Final       This was a in person visit in which 30 minutes of  total time was spent (either in face-to-face or non-face-to-face time).    Adi Jimenez MD, VI  VASCULAR AND INTERVENTIONAL PHYSICIAN  VASCULAR MEDICINE  INTERNAL MEDICINE  PAGER: [phone]  CALL SERVICE: [phone]

## 2022-07-08 ENCOUNTER — ANCILLARY PROCEDURE (OUTPATIENT)
Dept: MAMMOGRAPHY | Facility: CLINIC | Age: 72
End: 2022-07-08
Attending: INTERNAL MEDICINE
Payer: COMMERCIAL

## 2022-07-08 DIAGNOSIS — Z12.31 VISIT FOR SCREENING MAMMOGRAM: ICD-10-CM

## 2022-07-08 PROCEDURE — 77063 BREAST TOMOSYNTHESIS BI: CPT | Mod: TC | Performed by: RADIOLOGY

## 2022-07-08 PROCEDURE — 77067 SCR MAMMO BI INCL CAD: CPT | Mod: TC | Performed by: RADIOLOGY

## 2022-07-28 ENCOUNTER — TRANSFERRED RECORDS (OUTPATIENT)
Dept: HEALTH INFORMATION MANAGEMENT | Facility: CLINIC | Age: 72
End: 2022-07-28

## 2022-09-02 ENCOUNTER — TRANSFERRED RECORDS (OUTPATIENT)
Dept: HEALTH INFORMATION MANAGEMENT | Facility: CLINIC | Age: 72
End: 2022-09-02

## 2022-09-13 ENCOUNTER — TRANSFERRED RECORDS (OUTPATIENT)
Dept: HEALTH INFORMATION MANAGEMENT | Facility: CLINIC | Age: 72
End: 2022-09-13

## 2022-09-26 ENCOUNTER — TELEPHONE (OUTPATIENT)
Dept: VASCULAR SURGERY | Facility: CLINIC | Age: 72
End: 2022-09-26

## 2022-09-26 NOTE — TELEPHONE ENCOUNTER
Raj is requesting a call back to discuss the ultrasound that is scheduled for next week.  She declined to go into detail what her questions/concerns were.      Raj can be reached at [phone]

## 2022-09-26 NOTE — TELEPHONE ENCOUNTER
Writer spoke with Raj and stated that she is having R thigh pain. She was wondering if she could get an US done. Writer informed her there are no US openings. Writer suggested that she see Dr. Jimenez first then schedule the US if needed. She agreed with the plan.

## 2022-09-27 ENCOUNTER — ANCILLARY PROCEDURE (OUTPATIENT)
Dept: VASCULAR ULTRASOUND | Facility: CLINIC | Age: 72
End: 2022-09-27
Attending: RADIOLOGY
Payer: COMMERCIAL

## 2022-09-27 ENCOUNTER — OFFICE VISIT (OUTPATIENT)
Dept: VASCULAR SURGERY | Facility: CLINIC | Age: 72
End: 2022-09-27
Attending: RADIOLOGY
Payer: COMMERCIAL

## 2022-09-27 VITALS — DIASTOLIC BLOOD PRESSURE: 70 MMHG | RESPIRATION RATE: 16 BRPM | HEART RATE: 84 BPM | SYSTOLIC BLOOD PRESSURE: 130 MMHG

## 2022-09-27 DIAGNOSIS — I82.A11 ACUTE DEEP VEIN THROMBOSIS (DVT) OF AXILLARY VEIN OF RIGHT UPPER EXTREMITY (H): Primary | ICD-10-CM

## 2022-09-27 DIAGNOSIS — I82.A11 ACUTE DEEP VEIN THROMBOSIS (DVT) OF AXILLARY VEIN OF RIGHT UPPER EXTREMITY (H): ICD-10-CM

## 2022-09-27 PROCEDURE — 93971 EXTREMITY STUDY: CPT | Mod: RT

## 2022-09-27 PROCEDURE — G0463 HOSPITAL OUTPT CLINIC VISIT: HCPCS

## 2022-09-27 NOTE — PROGRESS NOTES
VASCULAR AND INTERVENTIONAL OUTPATIENT CONSULT OR VISIT  PHYSICIAN: Adi Jimenez MD  ESTABLISHED PATIENT    LOCATION: [facility]    Raj Kim   Medical Record #:  [id number]  YOB: 1950  Age:  72 year old     Date of Service: 9/27/2022    PRIMARY CARE PROVIDER: Lorenzo Dunn    Reason for visit: Right upper extremity deep vein thrombosis     IMPRESSION: 72-year-old female with first lifetime episode of venous thromboembolism with extensive right upper extremity deep vein thrombosis diagnosed on ultrasound study dated 12/22/2021.  Her identified risk factors at that time include estrogen replacement therapy as well as receiving 3 Covid vaccinations.   Estrogen replacement therapy has been discontinued.  She remains asymptomatic despise the diffuse involvement of right upper extremity veins.  She has been tolerating anticoagulation with Xarelto without any major adverse events.  She was experiencing intermittent blood-streaked stools and subsequently underwent a flex sig with GI.  Repeat ultrasound performed today demonstrates some interval improvement now with partial recanalization of the internal jugular vein.  Unfortunately, there is persistent occlusive thrombus in the axillary vein.      RECOMMENDATION:    1.  Continue anticoagulation with Xarelto.  The patient has completed approximately 9 months of anticoagulation.  The risks, benefits and alternatives of discontinuing versus continuing anticoagulation reviewed in detail with the patient. Will make a referral to hematology regarding duration of anticoagulation and if a hypercoagulable work-up is warranted which would influence the duration of anticoagulation.    Plan for a 12-month clinic follow-up/US.     HPI:  Raj Kim is a 72 year old female who was seen today in follow up for right upper extremity deep vein thrombosis.  The patient reports she incidentally noted a palpable abnormality in her right upper extremity  which prompted a right upper extremity venous duplex.  The ultrasound was performed at the [facility] in [place] on 10/22/2021. Imaging at that time demonstrated significant thrombus involving the right brachial, cephalic, basilic, axillary, subclavian and internal jugular veins.  She was subsequently started on anticoagulation with Xarelto and has been tolerating the medication well.  She previously reported blood streaks stools and was worked up by GI.  No recent falls identified.  The patient remains asymptomatic in her right upper extremity.  No lower extremity pain or swelling although recently she complains of pain in her right thigh for the past few weeks.  She has been working with [facility] regarding pain in her right foot.  No shortness of breath, dyspnea or chest pain.      PHH:    Past Medical History:   Diagnosis Date     Acute deep vein thrombosis (DVT) of axillary vein (H)      Fecal incontinence 07/27/2020     Hormone replacement therapy      Hx of radiation therapy      Osteopenia of multiple sites 05/02/2019     Ovarian cancer (H) 01/01/1980     Peripheral neuropathy, secondary to drugs or chemicals      Radiation colitis      Sensorineural hearing loss, bilateral 05/02/2019     Vitamin B 12 deficiency         Past Surgical History:   Procedure Laterality Date     FINGER SURGERY Right 01/01/2016    Dr. Tubbs- [facility]     HYSTERECTOMY  01/01/1980     LAPAROSCOPIC CHOLECYSTECTOMY       LAPAROTOMY EXPLORATORY      ovary/omentum abnormalities     OOPHORECTOMY Bilateral 01/01/1980       ALLERGIES:  Celecoxib, Cephalosporins, Hydrocodone-acetaminophen, and Penicillins    MEDS:    Current Outpatient Medications:      betamethasone dipropionate (DIPROLENE) 0.05 % ointment, Apply topically as needed, Disp: , Rfl:      calcium citrate-vitamin D3 (CITRACAL + D) 315-250 mg-unit per tablet, [CALCIUM CITRATE-VITAMIN D3 (CITRACAL + D) 315-250 MG-UNIT PER TABLET] Take 1 tablet by mouth daily.,  Disp: , Rfl:      cholecalciferol, vitamin D3, (VITAMIN D3) 2,000 unit capsule, [CHOLECALCIFEROL, VITAMIN D3, (VITAMIN D3) 2,000 UNIT CAPSULE] Take 5,000 Units by mouth daily.       , Disp: , Rfl:      cyanocobalamin, vitamin B-12, 1,000 mcg Subl, [CYANOCOBALAMIN, VITAMIN B-12, 1,000 MCG SUBL] Place 1,000 mcg under the tongue daily., Disp: , Rfl:      desonide (DESOWEN) 0.05 % cream, Apply topically as needed, Disp: , Rfl:      GABAPENTIN, ONCE-DAILY, PO, Take 200 mg by mouth daily, Disp: , Rfl:      Lactobacillus rhamnosus GG (CULTURELLE) 10-15 Billion cell capsule, [LACTOBACILLUS RHAMNOSUS GG (CULTURELLE) 10-15 BILLION CELL CAPSULE] Take 1 capsule by mouth daily., Disp: , Rfl:      Magnesium 400 MG TABS, Take by mouth daily, Disp: , Rfl:      Misc Natural Products (TART CHERRY ADVANCED PO), , Disp: , Rfl:      multivitamin with minerals (THERA-M) 9 mg iron-400 mcg Tab tablet, [MULTIVITAMIN WITH MINERALS (THERA-M) 9 MG IRON-400 MCG TAB TABLET] Take 1 tablet by mouth daily., Disp: , Rfl:      psyllium with dextrose (METAMUCIL JAR) powder, [PSYLLIUM WITH DEXTROSE (METAMUCIL JAR) POWDER] Take by mouth 3 (three) times a day. 1 tsp daily, Disp: , Rfl:      TART CHERRY PO, , Disp: , Rfl:      valACYclovir (VALTREX) 500 MG tablet, Take 500 mg by mouth as needed, Disp: , Rfl:      XARELTO ANTICOAGULANT 20 MG TABS tablet, TAKE 1 TABLET (20MG) BY MOUTH DAILY WITH DINNER., Disp: 90 tablet, Rfl: 1    SOCIAL HABITS:    History   Smoking Status     Former Smoker   Smokeless Tobacco     Never Used     Social History    Substance and Sexual Activity      Alcohol use: Yes        Comment: Alcoholic Drinks/day: occasional      History   Drug Use No       FAMILY HISTORY:    Family History   Problem Relation Age of Onset     Diabetes Father      Bladder Cancer Sister      Breast Cancer Maternal Grandmother      Colon Cancer Maternal Aunt      Breast Cancer Maternal Aunt        ADVANCE CARE DIRECTIVES:    Advance care directives  reviewed in the chart and no changes made.     PE:  /70   Pulse 84   Resp 16   Wt Readings from Last 1 Encounters:   06/28/22 59 kg (130 lb)     There is no height or weight on file to calculate BMI.    EXAM:  GENERAL: This is a well-developed 72 year old female who appears her stated age  EYES: Grossly normal.  MOUTH: Buccal mucosa normal   MUSCULOSKELETAL: Grossly normal and both lower extremities are intact.  HEME/LYMPH: No lymphedema  NEUROLOGIC: Focally intact, Alert and oriented x 3.   PSYCH: appropriate affect  INTEGUMENT: No open lesions or ulcers    DIAGNOSTIC STUDIES:     Images:  No results found.    LABS:      Sodium   Date Value Ref Range Status   05/26/2022 143 136 - 145 mmol/L Final   07/28/2021 140 136 - 145 mmol/L Final   07/27/2020 141 136 - 145 mmol/L Final     Urea Nitrogen   Date Value Ref Range Status   05/26/2022 19 8 - 22 mg/dL Final   07/28/2021 17 8 - 22 mg/dL Final   07/27/2020 14 8 - 22 mg/dL Final     Hemoglobin   Date Value Ref Range Status   05/26/2022 13.0 11.7 - 15.7 g/dL Final   07/28/2021 13.0 11.7 - 15.7 g/dL Final   07/27/2020 14.6 12.0 - 16.0 g/dL Final     Platelet Count   Date Value Ref Range Status   05/26/2022 325 150 - 450 10e3/uL Final   07/28/2021 270 150 - 450 10e3/uL Final   07/27/2020 305 140 - 440 thou/uL Final       This was a in person visit in which 30 minutes of  total time was spent (either in face-to-face or non-face-to-face time).    Adi Jimenez MD, [facility]  VASCULAR AND INTERVENTIONAL PHYSICIAN  VASCULAR MEDICINE  INTERNAL MEDICINE  PAGER: [phone]  CALL SERVICE: [phone]

## 2022-09-27 NOTE — PATIENT INSTRUCTIONS
YOU WILL BE CONTACTED TO SCHEDULE WITH HEMATOLOGY    WE WILL CONTACT YOU FOR THE 1 YEAR FOLLOW-UP WITH IMAGING

## 2022-09-27 NOTE — NURSING NOTE
[facility]        Patient is here for a 3 month  follow up  to discuss R UE DVT. No arm issues. She is wondering if she can have BP's done on this arm. Will address with Dr. Jimenez;.    Pt is currently taking Xarelto.    /70   Pulse 84   Resp 16     The provider has been notified that the patient has concerns of R upper thigh ache. Occurred for the last 2 weeks. Does not recall an injury. No pain with walking.      Questions patient would like addressed today are: N/A.    Refills are needed: N/A    Has homecare services and agency name:  Kaleigh Syed RN

## 2022-10-12 ENCOUNTER — ONCOLOGY VISIT (OUTPATIENT)
Dept: ONCOLOGY | Facility: HOSPITAL | Age: 72
End: 2022-10-12
Attending: RADIOLOGY
Payer: COMMERCIAL

## 2022-10-12 VITALS
HEIGHT: 65 IN | WEIGHT: 135.9 LBS | DIASTOLIC BLOOD PRESSURE: 65 MMHG | OXYGEN SATURATION: 98 % | SYSTOLIC BLOOD PRESSURE: 137 MMHG | TEMPERATURE: 98.3 F | BODY MASS INDEX: 22.64 KG/M2 | HEART RATE: 87 BPM | RESPIRATION RATE: 14 BRPM

## 2022-10-12 DIAGNOSIS — I82.A11 ACUTE DEEP VEIN THROMBOSIS (DVT) OF AXILLARY VEIN OF RIGHT UPPER EXTREMITY (H): Primary | ICD-10-CM

## 2022-10-12 PROCEDURE — G0463 HOSPITAL OUTPT CLINIC VISIT: HCPCS

## 2022-10-12 PROCEDURE — 99205 OFFICE O/P NEW HI 60 MIN: CPT | Performed by: INTERNAL MEDICINE

## 2022-10-12 RX ORDER — GABAPENTIN 100 MG/1
2 CAPSULE ORAL EVERY 24 HOURS
COMMUNITY
Start: 2022-09-13

## 2022-10-12 ASSESSMENT — PAIN SCALES - GENERAL: PAINLEVEL: MILD PAIN (3)

## 2022-10-12 NOTE — PROGRESS NOTES
[facility] Hematology and Oncology Consult Note      Patient: Raj Kim  MRN: [id number]  Date of Service: Oct 12, 2022      We have been asked by Dr. Jimenez to evaluate Raj Kim for history of DVT.    Assessment/Plan:    1.  History of right upper extremity DVT: Patient is on Xarelto and has been since her initial diagnosis of DVT in December 2021.  She has been tolerating it well with no significant bleeding complications or other side effects.  We reviewed the risks and benefits of both continuing and stopping anticoagulation.  Generally speaking, if she stops anticoagulation her recurrence risk probably would be around 15% lifetime.  If she were to recur then she would need to be on indefinite anticoagulation.  She could also continue on indefinite anticoagulation to lower her risk of recurrence to the low single digits.  If she did this, I would expect her bleeding risk to be quite low as she has no risk factors for clinically significant bleeding.  With indefinite anticoagulation, this is generally continued as long as the benefits are thought to outweigh the risks.    It is noted that this could be considered a provoked clot as she was on oral estrogen therapy at the time of diagnosis.  Thus, stopping anticoagulation at this time would be reasonable.  I do not think we need to do a hypercoagulable evaluation as such testing does not alter therapeutic anticoagulation management and has not been associated with improved outcomes in terms of mortality or recurrence (N Engl J Med 2017; 377:6584-6149).  Several studies of patients with a first episode of unprovoked venous thromboembolism found that the recurrence rate is independent of the presence of an inherited thrombophilic disorder.  For now she will stay on anticoagulation.  Follow-up with us as needed.    ECOG Performance  0    Staging History:     Cancer Staging   No matching staging information was found for the patient.    History:    Raj  comes in today to discuss anticoagulant management of a right upper extremity DVT which was diagnosed in December 2021.  Imaging at that time showed a DVT involving the right internal jugular, subclavian, axillary, and brachial veins.  There is also a superficial venous thrombosis in the right basilic vein.  It is noted at the time that she was taking estradiol.  She was on it for about 38 years before starting.  She is now taking Xarelto which she is tolerating well.  No bleeding complications.  She has no signs or symptoms of post thrombotic syndrome.    Past History:    Past Medical History:   Diagnosis Date     Acute deep vein thrombosis (DVT) of axillary vein (H)      Fecal incontinence 07/27/2020     Hormone replacement therapy      Hx of radiation therapy      Osteopenia of multiple sites 05/02/2019     Ovarian cancer (H) 01/01/1980     Peripheral neuropathy, secondary to drugs or chemicals      Radiation colitis      Sensorineural hearing loss, bilateral 05/02/2019     Vitamin B 12 deficiency     Family History   Problem Relation Age of Onset     Diabetes Father      Bladder Cancer Sister      Breast Cancer Maternal Grandmother      Colon Cancer Maternal Aunt      Breast Cancer Maternal Aunt       [unfilled] Social History     Socioeconomic History     Marital status:      Spouse name: Not on file     Number of children: 1     Years of education: Not on file     Highest education level: Not on file   Occupational History     Not on file   Tobacco Use     Smoking status: Former     Packs/day: 0.50     Years: 36.00     Pack years: 18.00     Types: Cigarettes     Smokeless tobacco: Never   Vaping Use     Vaping Use: Never used   Substance and Sexual Activity     Alcohol use: Yes     Comment: Alcoholic Drinks/day: occasional     Drug use: Never     Sexual activity: Not Currently   Other Topics Concern     Not on file   Social History Narrative    . One adopted son Ginette lives in [place] (age 35  "in 2018). 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   avid bird watcher/hobbyist...(nick Whitley/isaiah Gutierrez); sister-Oneida Roberts.-primary contact.         Social Determinants of Health     Financial Resource Strain: Not on file   Food Insecurity: Not on file   Transportation Needs: Not on file   Physical Activity: Not on file   Stress: Not on file   Social Connections: Not on file   Intimate Partner Violence: Not on file   Housing Stability: Not on file        Allergies:    Allergies   Allergen Reactions     Celecoxib Headache     Cephalosporins Hives     Hydrocodone-Acetaminophen Headache     Penicillins Hives     Review of Systems:    As above in the history.     Review of Systems otherwise Negative for:  General: chills, fever or night sweats  Psychological: anxiety or depression  Ophthalmic: blurry vision, double vision or loss of vision, vision change  ENT: epistaxis, oral lesions, hearing changes  Hematological and Lymphatic: bleeding, bruising, jaundice, swollen lymph nodes  Endocrine: hot flashes, unexpected weight changes  Respiratory: cough, hemoptysis, orthopnea or shortness of breath/KERR  Cardiovascular: chest pain, edema, palpitations or PND  Gastrointestinal: abdominal pain, blood in stools, change in bowel habits, constipation, diarrhea or nausea/vomiting  Genito-Urinary: change in urinary stream, incontinence, frequency/urgency  Musculoskeletal: joint pain, stiffness, swelling, muscle pain  Neurological: dizziness, headaches, numbness/tingling  Dermatological: lumps and rash    Physical Exam:    /65 (BP Location: Left arm, Patient Position: Sitting, Cuff Size: Adult Regular)   Pulse 87   Temp 98.3  F (36.8  C) (Tympanic)   Resp 14   Ht 1.657 m (5' 5.25\")   Wt 61.6 kg (135 lb 14.4 oz)   SpO2 98%   BMI 22.44 kg/m      General: patient appears stated age of 72 year old. Nontoxic and in no distress.   HEENT: Head: atraumatic, normocephalic. Sclerae anicteric.  Chest:  Normal " respiratory effort  Cardiac:  No edema.   Abdomen: abdomen is non-distended  Extremities: normal tone and muscle bulk.   Skin: no lesions or rash on visible skin. Warm and dry.   CNS: alert and oriented. Grossly non-focal.   Psychiatric: normal mood and affect.     Lab Results:    No results found for this or any previous visit (from the past 168 hour(s)).    Imaging Results:    US Upper Extremity Venous Duplex Right    Result Date: 9/27/2022  RIGHT Venous Ultrasound (Date: 09/27/22) RIGHT Upper Extremity Indication:  History of extensive right internal jugular, subclavian, axillary, and brachial DVT, Right arm basilic SVT/patient on anticoagulants.  Right upper extremity deep vein thrombosis/swelling/pain   Previous: 3/29/22; 06/28/22   Patient History Includes: DVT and Anticoagulants Right  ARM Location IJV SUBC.V  MOIZ. V BRV. MID RAD.V  ULN. V. CEPH. V BAS. V Compressibility (FC,PC,NC) PC PC NC FC FC FC FC FC Spontaneous + + 0 +     Phasic  - + 0 +     Augmentation 0 - 0 +     Patency - - 0 +     Impression: 1.  Partially occlusive, chronic thrombus in the internal jugular vein which was previously occlusive. 2.  Partially occlusive, chronic thrombus in the subclavian vein, unchanged. 3.  Occlusive, chronic thrombus in the axillary vein, unchanged. 4.  Patent radial, ulnar, cephalic and basilic veins, unchanged. Reference: Compressibility: FC= Fully compressible, PC= Partially compressible, NC= Non-compressible, NV= Not Visualized Venous Doppler: (+) = Present  (0) = Absent  (-) = Decreased/Unable to Evaluate, (NV) = Not Visualized      Pathology:    Signed by: Mark Ochoa MD

## 2022-10-12 NOTE — LETTER
10/12/2022         RE: Raj Kim  [address]  [place] [address]        Dear Colleague,    Thank you for referring your patient, Raj Kim, to the [facility]. Please see a copy of my visit note below.    [facility] Hematology and Oncology Consult Note      Patient: Raj Kmi  MRN: [id number]  Date of Service: Oct 12, 2022      We have been asked by Dr. Jimenez to evaluate Raj Kim for history of DVT.    Assessment/Plan:    1.  History of right upper extremity DVT: Patient is on Xarelto and has been since her initial diagnosis of DVT in December 2021.  She has been tolerating it well with no significant bleeding complications or other side effects.  We reviewed the risks and benefits of both continuing and stopping anticoagulation.  Generally speaking, if she stops anticoagulation her recurrence risk probably would be around 15% lifetime.  If she were to recur then she would need to be on indefinite anticoagulation.  She could also continue on indefinite anticoagulation to lower her risk of recurrence to the low single digits.  If she did this, I would expect her bleeding risk to be quite low as she has no risk factors for clinically significant bleeding.  With indefinite anticoagulation, this is generally continued as long as the benefits are thought to outweigh the risks.    It is noted that this could be considered a provoked clot as she was on oral estrogen therapy at the time of diagnosis.  Thus, stopping anticoagulation at this time would be reasonable.  I do not think we need to do a hypercoagulable evaluation as such testing does not alter therapeutic anticoagulation management and has not been associated with improved outcomes in terms of mortality or recurrence (N Engl J Med 2017; 377:5407-3709).  Several studies of patients with a first episode of unprovoked venous thromboembolism found that the recurrence rate is independent of the presence of  an inherited thrombophilic disorder.  For now she will stay on anticoagulation.  Follow-up with us as needed.    ECOG Performance  0    Staging History:     Cancer Staging   No matching staging information was found for the patient.    History:    Raj comes in today to discuss anticoagulant management of a right upper extremity DVT which was diagnosed in December 2021.  Imaging at that time showed a DVT involving the right internal jugular, subclavian, axillary, and brachial veins.  There is also a superficial venous thrombosis in the right basilic vein.  It is noted at the time that she was taking estradiol.  She was on it for about 38 years before starting.  She is now taking Xarelto which she is tolerating well.  No bleeding complications.  She has no signs or symptoms of post thrombotic syndrome.    Past History:    Past Medical History:   Diagnosis Date     Acute deep vein thrombosis (DVT) of axillary vein (H)      Fecal incontinence 07/27/2020     Hormone replacement therapy      Hx of radiation therapy      Osteopenia of multiple sites 05/02/2019     Ovarian cancer (H) 01/01/1980     Peripheral neuropathy, secondary to drugs or chemicals      Radiation colitis      Sensorineural hearing loss, bilateral 05/02/2019     Vitamin B 12 deficiency     Family History   Problem Relation Age of Onset     Diabetes Father      Bladder Cancer Sister      Breast Cancer Maternal Grandmother      Colon Cancer Maternal Aunt      Breast Cancer Maternal Aunt       [unfilled] Social History     Socioeconomic History     Marital status:      Spouse name: Not on file     Number of children: 1     Years of education: Not on file     Highest education level: Not on file   Occupational History     Not on file   Tobacco Use     Smoking status: Former     Packs/day: 0.50     Years: 36.00     Pack years: 18.00     Types: Cigarettes     Smokeless tobacco: Never   Vaping Use     Vaping Use: Never used   Substance and Sexual Activity      Alcohol use: Yes     Comment: Alcoholic Drinks/day: occasional     Drug use: Never     Sexual activity: Not Currently   Other Topics Concern     Not on file   Social History Narrative    . One adopted son Surendra- lives in [place] (age 35 in 2018). 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.   avid bird watcher/hobbyist...(nick Whitley/isaiah Gutierrez); sister-Oneida Roberts.-primary contact.         Social Determinants of Health     Financial Resource Strain: Not on file   Food Insecurity: Not on file   Transportation Needs: Not on file   Physical Activity: Not on file   Stress: Not on file   Social Connections: Not on file   Intimate Partner Violence: Not on file   Housing Stability: Not on file        Allergies:    Allergies   Allergen Reactions     Celecoxib Headache     Cephalosporins Hives     Hydrocodone-Acetaminophen Headache     Penicillins Hives     Review of Systems:    As above in the history.     Review of Systems otherwise Negative for:  General: chills, fever or night sweats  Psychological: anxiety or depression  Ophthalmic: blurry vision, double vision or loss of vision, vision change  ENT: epistaxis, oral lesions, hearing changes  Hematological and Lymphatic: bleeding, bruising, jaundice, swollen lymph nodes  Endocrine: hot flashes, unexpected weight changes  Respiratory: cough, hemoptysis, orthopnea or shortness of breath/KERR  Cardiovascular: chest pain, edema, palpitations or PND  Gastrointestinal: abdominal pain, blood in stools, change in bowel habits, constipation, diarrhea or nausea/vomiting  Genito-Urinary: change in urinary stream, incontinence, frequency/urgency  Musculoskeletal: joint pain, stiffness, swelling, muscle pain  Neurological: dizziness, headaches, numbness/tingling  Dermatological: lumps and rash    Physical Exam:    /65 (BP Location: Left arm, Patient Position: Sitting, Cuff Size: Adult Regular)   Pulse 87   Temp 98.3  F (36.8  C) (Tympanic)   Resp 14   Ht  "1.657 m (5' 5.25\")   Wt 61.6 kg (135 lb 14.4 oz)   SpO2 98%   BMI 22.44 kg/m      General: patient appears stated age of 72 year old. Nontoxic and in no distress.   HEENT: Head: atraumatic, normocephalic. Sclerae anicteric.  Chest:  Normal respiratory effort  Cardiac:  No edema.   Abdomen: abdomen is non-distended  Extremities: normal tone and muscle bulk.   Skin: no lesions or rash on visible skin. Warm and dry.   CNS: alert and oriented. Grossly non-focal.   Psychiatric: normal mood and affect.     Lab Results:    No results found for this or any previous visit (from the past 168 hour(s)).    Imaging Results:    US Upper Extremity Venous Duplex Right    Result Date: 9/27/2022  RIGHT Venous Ultrasound (Date: 09/27/22) RIGHT Upper Extremity Indication:  History of extensive right internal jugular, subclavian, axillary, and brachial DVT, Right arm basilic SVT/patient on anticoagulants.  Right upper extremity deep vein thrombosis/swelling/pain   Previous: 3/29/22; 06/28/22   Patient History Includes: DVT and Anticoagulants Right  ARM Location IJV SUBC.V  MOIZ. V BRV. MID RAD.V  ULN. V. CEPH. V BAS. V Compressibility (FC,PC,NC) PC PC NC FC FC FC FC FC Spontaneous + + 0 +     Phasic  - + 0 +     Augmentation 0 - 0 +     Patency - - 0 +     Impression: 1.  Partially occlusive, chronic thrombus in the internal jugular vein which was previously occlusive. 2.  Partially occlusive, chronic thrombus in the subclavian vein, unchanged. 3.  Occlusive, chronic thrombus in the axillary vein, unchanged. 4.  Patent radial, ulnar, cephalic and basilic veins, unchanged. Reference: Compressibility: FC= Fully compressible, PC= Partially compressible, NC= Non-compressible, NV= Not Visualized Venous Doppler: (+) = Present  (0) = Absent  (-) = Decreased/Unable to Evaluate, (NV) = Not Visualized      Pathology:    Signed by: Mark Ochoa MD      Oncology Rooming Note    October 12, 2022 11:19 AM   Raj Kim is a 72 year old female " "who presents for:    Chief Complaint   Patient presents with     Hematology     New patient consult related to Acute deep vein thrombosis (DVT) of axillary vein of right upper extremity     Initial Vitals: /65 (BP Location: Left arm, Patient Position: Sitting, Cuff Size: Adult Regular)   Pulse 87   Temp 98.3  F (36.8  C) (Tympanic)   Resp 14   Ht 1.657 m (5' 5.25\")   Wt 61.6 kg (135 lb 14.4 oz)   SpO2 98%   BMI 22.44 kg/m   Estimated body mass index is 22.44 kg/m  as calculated from the following:    Height as of this encounter: 1.657 m (5' 5.25\").    Weight as of this encounter: 61.6 kg (135 lb 14.4 oz). Body surface area is 1.68 meters squared.  Mild Pain (3) Comment: Data Unavailable   No LMP recorded.  Allergies reviewed: Yes  Medications reviewed: Yes    Medications: Medication refills not needed today.  Pharmacy name entered into [employer]:    [facility] - [place], [place] - [address] [place] [address]  [facility] [facility][id number] - [place], [place] - [address] AT [facility] (MAIL SERVICE) [facility] - [place], [place] - [address] AT [place] [address]    Clinical concerns: New patient consult related to Acute deep vein thrombosis (DVT) of axillary vein of right upper extremity        JD KNAPP CMA                Again, thank you for allowing me to participate in the care of your patient.        Sincerely,        Mark Ochoa MD    "

## 2022-10-12 NOTE — PROGRESS NOTES
"Oncology Rooming Note    October 12, 2022 11:19 AM   Raj Kim is a 72 year old female who presents for:    Chief Complaint   Patient presents with     Hematology     New patient consult related to Acute deep vein thrombosis (DVT) of axillary vein of right upper extremity     Initial Vitals: /65 (BP Location: Left arm, Patient Position: Sitting, Cuff Size: Adult Regular)   Pulse 87   Temp 98.3  F (36.8  C) (Tympanic)   Resp 14   Ht 1.657 m (5' 5.25\")   Wt 61.6 kg (135 lb 14.4 oz)   SpO2 98%   BMI 22.44 kg/m   Estimated body mass index is 22.44 kg/m  as calculated from the following:    Height as of this encounter: 1.657 m (5' 5.25\").    Weight as of this encounter: 61.6 kg (135 lb 14.4 oz). Body surface area is 1.68 meters squared.  Mild Pain (3) Comment: Data Unavailable   No LMP recorded.  Allergies reviewed: Yes  Medications reviewed: Yes    Medications: Medication refills not needed today.  Pharmacy name entered into [employer]):    [facility] - [place], [place] - [address] [place] [address]  [id number] - [place], [place] - [address] AT [facility] (MAIL SERVICE) [facility] - [place], [place] - [address] AT [place] & [address]    Clinical concerns: New patient consult related to Acute deep vein thrombosis (DVT) of axillary vein of right upper extremity        JD KNAPP CMA            "

## 2022-10-13 ENCOUNTER — PATIENT OUTREACH (OUTPATIENT)
Dept: ONCOLOGY | Facility: HOSPITAL | Age: 72
End: 2022-10-13

## 2022-10-13 ENCOUNTER — TELEPHONE (OUTPATIENT)
Dept: ONCOLOGY | Facility: HOSPITAL | Age: 72
End: 2022-10-13

## 2022-10-13 NOTE — PROGRESS NOTES
Patient called in and left a message on the  line yesterday after her appointment with Dr. Ochoa.  She states that she was at home reading her after visit summary and it stated that Dr. Ochoa wanted her to stop taking the tart cherry supplement.  She states that this was not talked about at all.  She wants to know if she does need to stop it and if she does she would like an explanation why.  I will get this over to Dr. Ochoa and the RN care coordinator pool to discuss and call patient back at [phone].    Per Dr. Ochoa:    She does not need to stop taking the tart cherry.  I am not sure what happened there but she can continue to take it.     Patient called to let her know.  Patient was appreciative of the call.

## 2022-10-13 NOTE — TELEPHONE ENCOUNTER
Patient called in and left a message on the  line yesterday after her appointment with Dr. Ochoa.  She states that she was at home reading her after visit summary and it stated that Dr. Ochoa wanted her to stop taking the tart cherry supplement.  She states that this was not talked about at all.  She wants to know if she does need to stop it and if she does she would like an explanation why.  I will get this over to Dr. Ochoa and the RN care coordinator pool to discuss and call patient back at [phone].    Savannah Roberts RN

## 2023-01-12 DIAGNOSIS — I82.A11 ACUTE DEEP VEIN THROMBOSIS (DVT) OF AXILLARY VEIN OF RIGHT UPPER EXTREMITY (H): ICD-10-CM

## 2023-01-12 NOTE — TELEPHONE ENCOUNTER
Reason for call:  Medication   If this is a refill request, has the caller requested the refill from the pharmacy already? Yes  Will the patient be using a [facility]? No  Name of the pharmacy and phone number for the current request: [facility]    Name of the medication requested: Xarelto    Other request: Patient stated her pharmacy has reached out for a refill but has received no response. Please advise.     Phone number to reach patient:  Cell number on file:    Telephone Information:   Mobile [phone]       Best Time:  Asap    Can we leave a detailed message on this number?  YES    Travel screening: Not Applicable

## 2023-01-13 NOTE — TELEPHONE ENCOUNTER
Routing refill request to provider for review/approval because:  Drug not on the [facility] refill protocol     Last Written Prescription Date:  6/20/22  Last Fill Quantity: 90,  # refills: 1   Last office visit provider:  5/26/22     Requested Prescriptions   Pending Prescriptions Disp Refills     rivaroxaban ANTICOAGULANT (XARELTO ANTICOAGULANT) 20 MG TABS tablet 90 tablet 1     Sig: TAKE 1 TABLET (20MG) BY MOUTH DAILY WITH DINNER. Strength: 20 mg       Direct Oral Anticoagulant Agents Failed - 1/12/2023  9:22 AM        Failed - Creatinine Clearance greater than 50 ml/min on file in past 3 mos     No lab results found.          Failed - Serum creatinine less than or equal to 1.4 on file in past 3 mos     Recent Labs   Lab Test 05/26/22  1004   CR 0.86       Ok to refill medication if creatinine is low          Failed - Recent (6 mo) or future (30 days) visit within the authorizing provider's specialty        Passed - Normal Platelets on file in past 12 months     Recent Labs   Lab Test 05/26/22  1004                  Passed - Medication is active on med list        Passed - Patient is 18 years of age or older        Passed - No active pregnancy on record        Passed - No positive pregnancy test within past 12 months             Lisbeth Melara, RN 01/13/23 11:09 AM   Pandemic precautions

## 2023-01-13 NOTE — TELEPHONE ENCOUNTER
Outpatient Medication Detail     Disp Refills Start End DEDRA   XARELTO ANTICOAGULANT 20 MG TABS tablet 90 tablet 1 6/20/2022  No   Sig: TAKE 1 TABLET (20MG) BY MOUTH DAILY WITH DINNER.   Sent to pharmacy as: Xarelto 20 MG Oral Tablet (rivaroxaban ANTICOAGULANT)   Class: E-Prescribe   Order: [id number]   E-Prescribing Status: Receipt confirmed by pharmacy (6/20/2022  7:45 AM CDT)     Routing refill request to provider for review/approval because:  Drug not on the [facility] refill protocol   Labs out of range:  Creatinine     Last Written Prescription Date:  6/20/22  Last Fill Quantity: 90,  # refills: 1   Last office visit provider: 5/26/22    Requested Prescriptions   Pending Prescriptions Disp Refills     rivaroxaban ANTICOAGULANT (XARELTO ANTICOAGULANT) 20 MG TABS tablet 90 tablet 1     Sig: TAKE 1 TABLET (20MG) BY MOUTH DAILY WITH DINNER. Strength: 20 mg       Direct Oral Anticoagulant Agents Failed - 1/13/2023  3:53 PM        Failed - Creatinine Clearance greater than 50 ml/min on file in past 3 mos     No lab results found.          Failed - Serum creatinine less than or equal to 1.4 on file in past 3 mos     Recent Labs   Lab Test 05/26/22  1004   CR 0.86       Ok to refill medication if creatinine is low          Failed - Recent (6 mo) or future (30 days) visit within the authorizing provider's specialty        Passed - Normal Platelets on file in past 12 months     Recent Labs   Lab Test 05/26/22  1004                  Passed - Medication is active on med list        Passed - Patient is 18 years of age or older        Passed - No active pregnancy on record        Passed - No positive pregnancy test within past 12 months             HENRI CALDWELL RN 01/13/23 4:23 PM

## 2023-01-16 ENCOUNTER — TELEPHONE (OUTPATIENT)
Dept: INTERNAL MEDICINE | Facility: CLINIC | Age: 73
End: 2023-01-16

## 2023-01-16 NOTE — TELEPHONE ENCOUNTER
Spoke with patient and she was made aware that the medication was sent by Dr. Dunn today, pt did not have any further questions at the time of this phone call.

## 2023-01-16 NOTE — TELEPHONE ENCOUNTER
General Call      Reason for Call:  Pt wanting to speak to someone regarding her refill of Xarelto    Send High Priority message to her PCP    What are your questions or concerns:  n/a    Date of last appointment with provider: n/a    Could we send this information to you in [facility] or would you prefer to receive a phone call?:   Patient would prefer a phone call   Okay to leave a detailed message?: Yes at Cell number on file:    Telephone Information:   Mobile [phone]

## 2023-01-18 ENCOUNTER — TELEPHONE (OUTPATIENT)
Dept: INTERNAL MEDICINE | Facility: CLINIC | Age: 73
End: 2023-01-18
Payer: COMMERCIAL

## 2023-01-18 NOTE — TELEPHONE ENCOUNTER
Forms/Letter Request    Type of form/letter: H&P    Have you been seen for this request: No     Do we have the form/letter: No    When is form/letter needed by: 3/6/2023    How would you like the form/letter returned: Fax [phone] [facility]    Patient Notified form requests are processed in 3-5 business days:Yes    Could we send this information to you in [employer] or would you prefer to receive a phone call?:   Patient would prefer a phone call   Okay to leave a detailed message?: Yes at Cell number on file:    Telephone Information:   Mobile [phone]

## 2023-02-09 ENCOUNTER — TELEPHONE (OUTPATIENT)
Dept: INTERNAL MEDICINE | Facility: CLINIC | Age: 73
End: 2023-02-09
Payer: COMMERCIAL

## 2023-02-09 NOTE — TELEPHONE ENCOUNTER
2/9/2023 called PT and left message for PT to call to reschedule ANW appointment; advised PCP not in on Mondays.

## 2023-02-28 ENCOUNTER — OFFICE VISIT (OUTPATIENT)
Dept: FAMILY MEDICINE | Facility: CLINIC | Age: 73
End: 2023-02-28
Payer: COMMERCIAL

## 2023-02-28 VITALS
TEMPERATURE: 98.8 F | OXYGEN SATURATION: 99 % | RESPIRATION RATE: 14 BRPM | HEIGHT: 65 IN | HEART RATE: 91 BPM | WEIGHT: 130.8 LBS | DIASTOLIC BLOOD PRESSURE: 62 MMHG | BODY MASS INDEX: 21.79 KG/M2 | SYSTOLIC BLOOD PRESSURE: 105 MMHG

## 2023-02-28 DIAGNOSIS — S96.891S: ICD-10-CM

## 2023-02-28 DIAGNOSIS — Z01.818 PREOP GENERAL PHYSICAL EXAM: Primary | ICD-10-CM

## 2023-02-28 PROBLEM — R15.2 FECAL URGENCY: Status: ACTIVE | Noted: 2023-02-28

## 2023-02-28 PROBLEM — R15.9 FECAL INCONTINENCE: Status: ACTIVE | Noted: 2020-07-27

## 2023-02-28 PROBLEM — K62.7 RADIATION PROCTITIS: Status: ACTIVE | Noted: 2022-09-02

## 2023-02-28 PROBLEM — I82.A19: Status: RESOLVED | Noted: 2021-12-22 | Resolved: 2023-02-28

## 2023-02-28 PROBLEM — E78.5 HYPERLIPIDEMIA: Status: RESOLVED | Noted: 2022-12-29 | Resolved: 2023-02-28

## 2023-02-28 PROBLEM — E78.5 HYPERLIPIDEMIA: Status: ACTIVE | Noted: 2022-12-29

## 2023-02-28 LAB — HGB BLD-MCNC: 11.3 G/DL (ref 11.7–15.7)

## 2023-02-28 PROCEDURE — 36415 COLL VENOUS BLD VENIPUNCTURE: CPT | Performed by: FAMILY MEDICINE

## 2023-02-28 PROCEDURE — 99214 OFFICE O/P EST MOD 30 MIN: CPT | Performed by: FAMILY MEDICINE

## 2023-02-28 PROCEDURE — 85018 HEMOGLOBIN: CPT | Performed by: FAMILY MEDICINE

## 2023-02-28 ASSESSMENT — PAIN SCALES - GENERAL: PAINLEVEL: MILD PAIN (2)

## 2023-02-28 NOTE — PATIENT INSTRUCTIONS
You meet criteria for genetic testing, based on personal history of ovarian cancer and mother's side history of breast and colon cancer    Please either bring us or send and electronic copy of yourl iving will

## 2023-02-28 NOTE — PROGRESS NOTES
[facility]  [address] [place] [address]  Phone: [phone]  Fax: [phone]  Primary Provider: Lorenzo Dunn  Pre-op Performing Provider: CARLEEN EDWARDS      PREOPERATIVE EVALUATION:  Today's date: 2/28/2023    Raj Kim is a 72 year old female who presents for a preoperative evaluation.    Surgical Information:  Surgery/Procedure: R ankle   Surgery Location: [facility]  Surgeon: Gonzalez Hammer [facility]  Surgery Date: 3/7/2023  Time of Surgery: TBD  Where patient plans to recover: At home with family  Fax number for surgical facility: [phone]    Type of Anesthesia Anticipated: to be determined    Assessment & Plan     The proposed surgical procedure is considered INTERMEDIATE risk.    Preop general physical exam  - Hemoglobin    Other specified injury of other specified muscles and tendons at ankle and foot level, right foot, sequela  Reason for surgery (I cannot find notes from [facility])          Medication Instructions:  Patient is to take all scheduled medications on the day of surgery EXCEPT for modifications listed below:   Hold rivaroxaban two days before surgery    RECOMMENDATION:  APPROVAL GIVEN to proceed with proposed procedure, without further diagnostic evaluation.    }    Subjective     HPI related to upcoming procedure: Has a partially torn tendon below right ankles. IT just started hurting.  It's been at least 6 months - given time, not any better    Preop Questions 2/21/2023   1. Have you ever had a heart attack or stroke? No   2. Have you ever had surgery on your heart or blood vessels, such as a stent placement, a coronary artery bypass, or surgery on an artery in your head, neck, heart, or legs? No   3. Do you have chest pain with activity? No   4. Do you have a history of  heart failure? No   5. Do you currently have a cold, bronchitis or symptoms of other infection? No   6. Do you have a cough, shortness  of breath, or wheezing? No   7. Do you or anyone in your family have previous history of blood clots? YES - DVT right arm to [facility] - 12/2021. Hematologist  sent to a specialist to see how long she was on Xarelto - will follow up with hematology in September 2023.  WAS ON ESTROGEN   8. Do you or does anyone in your family have a serious bleeding problem such as prolonged bleeding following surgeries or cuts? No   9. Have you ever had problems with anemia or been told to take iron pills? No   10. Have you had any abnormal blood loss such as black, tarry or bloody stools, or abnormal vaginal bleeding? No   11. Have you ever had a blood transfusion? No   12. Are you willing to have a blood transfusion if it is medically needed before, during, or after your surgery? Yes   13. Have you or any of your relatives ever had problems with anesthesia? YES - Dad had - hard time waking him up. Patient has NOT personal history of anesthesia issue   14. Do you have sleep apnea, excessive snoring or daytime drowsiness? No   15. Do you have any artifical heart valves or other implanted medical devices like a pacemaker, defibrillator, or continuous glucose monitor? No   16. Do you have artificial joints? No   17. Are you allergic to latex? No       Health Care Directive:  Patient does not have a Health Care Directive or Living Will: Patient states has Advance Directive and will bring in a copy to clinic.    Preoperative Review of :   reviewed - controlled substances reflected in medication list.   - gabapentin        Review of Systems  Constitutional: negative for recent illness or change in weight  Eyes: negative for irritation and vision change  Ears, nose, mouth, throat, and face: negative for nasal congestion and sore throat  Respiratory: negative for cough and dyspnea on exertion  Cardiovascular: negative for chest pain and palpitations  Gastrointestinal: negative for abdominal pain and change in bowel  habits  Genitourinary:negative for dysuria, frequency and hesitancy  Integument/breast: negative for rash  Hematologic/lymphatic: negative for bleeding and easy bruising  Musculoskeletal:negative for arthralgias and myalgias  Neurological: negative for dizziness, headaches and paresthesia        Patient Active Problem List    Diagnosis Date Noted     Fecal urgency 02/28/2023     Priority: Medium     Radiation proctitis 09/02/2022     Priority: Medium     Leg cramps 05/26/2022     Priority: Medium     Vitamin D deficiency 05/26/2022     Priority: Medium     Need for hepatitis C screening test 12/24/2021     Priority: Medium     Fecal incontinence 07/27/2020     Priority: Medium     After radiation for ovarian cancer       Vitamin B12 deficiency (non anemic)      Priority: Medium     Created by Conversion         Sensorineural hearing loss, bilateral 05/02/2019     Priority: Medium     Kyphosis (acquired) (postural) 07/17/2017     Priority: Medium     Peripheral Neuropathy      Priority: Medium     Created by [facility] Annotation: Feb 24 2012  3:52PM - Lorenzo Pelletier: left   leg/lower   extremity; extensive w/u negative (Dr. Wilks)  Replacement Utility updated for latest IMO load         Radiation Therapy      Priority: Medium     Created by [facility] Annotation: Feb 5 2009  9:32AM - Lorenzo Pelletier: low grade   ovarian   malignancy  Replacement Utility updated for latest IMO load         Lactose intolerance 05/23/2016     Priority: Medium     RBBB 05/23/2016     Priority: Medium     Benign polyp of large intestine 05/23/2016     Priority: Medium     2011- tubular adenoma. Every 5 yr screen         Benign neoplasm of transverse colon 10/07/2015     Priority: Medium     Personal history of ovarian cancer 06/03/2015     Priority: Medium     Venous stasis 06/03/2015     Priority: Medium     Incontinence of feces 05/11/2015     Priority: Medium     Varicose veins 03/02/2015     Priority: Medium      Acquired lymphedema of leg 03/02/2015     Priority: Medium     Hyperlipidemia LDL goal <100      Priority: Medium     Created by [facility] Annotation: Feb 5 2008  3:54PM - Lorenzo Pelletier: mild increase   triglycerides         Papillary Carcinoma Of The Ovary      Priority: Medium     Created by [facility] Annotation: Feb 5 2009  9:32AM - Liyah Lorenzo: low grade          Past Medical History:   Diagnosis Date     Acute deep vein thrombosis (DVT) of axillary vein (H)      Acute deep vein thrombosis (DVT) of axillary vein (H) 12/22/2021     Fecal incontinence 07/27/2020     Hormone replacement therapy      Hx of radiation therapy      Lyme disease 7/24/2016     Osteopenia of multiple sites 05/02/2019     Ovarian cancer (H) 01/01/1980     Peripheral neuropathy, secondary to drugs or chemicals      Radiation colitis      Sensorineural hearing loss, bilateral 05/02/2019     Vitamin B 12 deficiency      Past Surgical History:   Procedure Laterality Date     FINGER SURGERY Bilateral 01/01/2016    [facility]     HYSTERECTOMY  01/01/1980     LAPAROSCOPIC CHOLECYSTECTOMY  2008     LAPAROTOMY EXPLORATORY  1980    ovary/omentum abnormalities     OOPHORECTOMY Bilateral 01/01/1980     Current Outpatient Medications   Medication Sig Dispense Refill     betamethasone dipropionate (DIPROLENE) 0.05 % ointment Apply topically as needed       calcium citrate-vitamin D3 (CITRACAL + D) 315-250 mg-unit per tablet [CALCIUM CITRATE-VITAMIN D3 (CITRACAL + D) 315-250 MG-UNIT PER TABLET] Take 1 tablet by mouth daily.       cholecalciferol, vitamin D3, (VITAMIN D3) 2,000 unit capsule [CHOLECALCIFEROL, VITAMIN D3, (VITAMIN D3) 2,000 UNIT CAPSULE] Take 5,000 Units by mouth daily.              cyanocobalamin, vitamin B-12, 1,000 mcg Subl [CYANOCOBALAMIN, VITAMIN B-12, 1,000 MCG SUBL] Place 1,000 mcg under the tongue daily.       desonide (DESOWEN) 0.05 % cream Apply topically as needed        GABAPENTIN, ONCE-DAILY, PO Take 200 mg by mouth daily       Lactobacillus rhamnosus GG (CULTURELLE) 10-15 Billion cell capsule [LACTOBACILLUS RHAMNOSUS GG (CULTURELLE) 10-15 BILLION CELL CAPSULE] Take 1 capsule by mouth daily.       Magnesium 400 MG TABS Take by mouth daily       multivitamin with minerals (THERA-M) 9 mg iron-400 mcg Tab tablet [MULTIVITAMIN WITH MINERALS (THERA-M) 9 MG IRON-400 MCG TAB TABLET] Take 1 tablet by mouth daily.       psyllium with dextrose (METAMUCIL JAR) powder 1 teaspoon daily       rivaroxaban ANTICOAGULANT (XARELTO ANTICOAGULANT) 20 MG TABS tablet TAKE 1 TABLET (20MG) BY MOUTH DAILY WITH DINNER. Strength: 20 mg 90 tablet 1     TART CHERRY PO Take 2 capsules by mouth daily       valACYclovir (VALTREX) 500 MG tablet Take 500 mg by mouth as needed       gabapentin (NEURONTIN) 100 MG capsule Take 2 capsules by mouth every 24 hours (Patient not taking: Reported on 2023)         Allergies   Allergen Reactions     Acetaminophen Diarrhea     Celecoxib Headache     Cephalosporins Hives     Hydrocodone Diarrhea     Hydrocodone-Acetaminophen Headache     Nabumetone Diarrhea     Penicillins Hives        Social History     Tobacco Use     Smoking status: Former     Packs/day: 0.50     Years: 10.00     Pack years: 5.00     Types: Cigarettes     Quit date:      Years since quittin.1     Smokeless tobacco: Never   Substance Use Topics     Alcohol use: Yes     Comment: Alcoholic Drinks/day: occasional     Family History   Problem Relation Age of Onset     Arthritis Mother      Dementia Mother      Diabetes Father      Bladder Cancer Sister      No Known Problems Sister      Diabetes Type 2  Brother      Breast Cancer Maternal Grandmother      Colon Cancer Maternal Aunt      Breast Cancer Maternal Aunt      History   Drug Use Unknown         Objective     /62 (BP Location: Left arm, Patient Position: Sitting, Cuff Size: Adult Regular)   Pulse 91   Temp 98.8  F (37.1  C)  "(Tympanic)   Resp 14   Ht 1.657 m (5' 5.24\")   Wt 59.3 kg (130 lb 12.8 oz)   SpO2 99%   BMI 21.61 kg/m      Physical Exam  General appearance - alert, well appearing, and in no distress  Mental status - normal mood, behavior, speech, dress, motor activity, and thought processes  Eyes - pupils equal and reactive, extraocular eye movements intact, funduscopic exam normal, discs flat and sharp  Ears - bilateral TM's and external ear canals normal  Mouth - mucous membranes moist, pharynx normal without lesions  Neck - supple, no significant adenopathy, carotids upstroke normal bilaterally, no bruits, thyroid exam: thyroid is normal in size without nodules or tenderness  Chest - clear to auscultation, no wheezes, rales or rhonchi, symmetric air entry  Heart - normal rate, regular rhythm, normal S1, S2, no murmurs, rubs, clicks or gallops  Abdomen - soft, nontender, nondistended, no masses or organomegaly  Neurological - alert, oriented, normal speech, no focal findings or movement disorder noted, DTR's normal and symmetric  Extremities - peripheral pulses normal, no pedal edema, no clubbing or cyanosis  Skin - no rashes or worrisome lesions      Recent Labs   Lab Test 05/26/22  1004 07/28/21  1100   HGB 13.0 13.0    270    140   POTASSIUM 4.6 4.9   CR 0.86 0.79        Diagnostics:  Recent Results (from the past 48 hour(s))   Hemoglobin    Collection Time: 02/28/23 10:21 AM   Result Value Ref Range    Hemoglobin 11.3 (L) 11.7 - 15.7 g/dL      No EKG required, no history of coronary heart disease, significant arrhythmia, peripheral arterial disease or other structural heart disease.    Revised Cardiac Risk Index (RCRI):  The patient has the following serious cardiovascular risks for perioperative complications:   - No serious cardiac risks = 0 points     RCRI Interpretation: 0 points: Class I (very low risk - 0.4% complication rate)           Signed Electronically by: Oneida Herrmann MD  Copy of " this evaluation report is provided to requesting physician.

## 2023-03-02 DIAGNOSIS — R71.0 DROP IN HEMOGLOBIN: Primary | ICD-10-CM

## 2023-03-07 ENCOUNTER — TRANSFERRED RECORDS (OUTPATIENT)
Dept: HEALTH INFORMATION MANAGEMENT | Facility: CLINIC | Age: 73
End: 2023-03-07

## 2023-05-02 ENCOUNTER — TELEPHONE (OUTPATIENT)
Dept: INTERNAL MEDICINE | Facility: CLINIC | Age: 73
End: 2023-05-02
Payer: COMMERCIAL

## 2023-05-02 DIAGNOSIS — M85.89 OTHER SPECIFIED DISORDERS OF BONE DENSITY AND STRUCTURE, MULTIPLE SITES: Primary | ICD-10-CM

## 2023-05-02 NOTE — TELEPHONE ENCOUNTER
Order/Referral Request    Who is requesting: Patient     Orders being requested: Bone Density Scan    Reason service is needed/diagnosis: Patient state she usually get this scan done and was supposed to do another one in 2 years from her last scan. It has been 2 years and she needs an order.     When are orders needed by: ASAP    Has this been discussed with Provider: Yes    Does patient have a preference on a Group/Provider/Facility? [facility]    Does patient have an appointment scheduled?: No    Where to send orders: Place orders within Epic    Could we send this information to you in [facility] or would you prefer to receive a phone call?:   Patient would prefer a phone call   Okay to leave a detailed message?: Yes at Cell number on file:    Telephone Information:   Mobile [phone]

## 2023-05-02 NOTE — TELEPHONE ENCOUNTER
Last DEXA scan on 05/24/21.  Per results note:  Recommendations:  Appropriate calcium, vitamin D supplements, along with balance and weight bearing exercise recommended with follow up bone density scan in 2 years.       Patient requesting order for DEXA scan

## 2023-05-04 ENCOUNTER — LAB (OUTPATIENT)
Dept: LAB | Facility: CLINIC | Age: 73
End: 2023-05-04
Payer: COMMERCIAL

## 2023-05-04 DIAGNOSIS — R71.0 DROP IN HEMOGLOBIN: ICD-10-CM

## 2023-05-04 LAB — HGB BLD-MCNC: 11.3 G/DL (ref 11.7–15.7)

## 2023-05-04 PROCEDURE — 85018 HEMOGLOBIN: CPT

## 2023-05-04 PROCEDURE — 36415 COLL VENOUS BLD VENIPUNCTURE: CPT

## 2023-05-08 ENCOUNTER — MYC MEDICAL ADVICE (OUTPATIENT)
Dept: FAMILY MEDICINE | Facility: CLINIC | Age: 73
End: 2023-05-08
Payer: COMMERCIAL

## 2023-05-16 ENCOUNTER — TELEPHONE (OUTPATIENT)
Dept: VASCULAR SURGERY | Facility: CLINIC | Age: 73
End: 2023-05-16
Payer: COMMERCIAL

## 2023-05-16 ENCOUNTER — OFFICE VISIT (OUTPATIENT)
Dept: VASCULAR SURGERY | Facility: CLINIC | Age: 73
End: 2023-05-16
Attending: RADIOLOGY
Payer: COMMERCIAL

## 2023-05-16 VITALS — DIASTOLIC BLOOD PRESSURE: 60 MMHG | SYSTOLIC BLOOD PRESSURE: 138 MMHG | HEART RATE: 80 BPM | RESPIRATION RATE: 16 BRPM

## 2023-05-16 DIAGNOSIS — R31.0 GROSS HEMATURIA: Primary | ICD-10-CM

## 2023-05-16 PROCEDURE — G0463 HOSPITAL OUTPT CLINIC VISIT: HCPCS

## 2023-05-16 NOTE — PATIENT INSTRUCTIONS
Moises Anthony,    Thank you for entrusting your care with us today. After your visit today with MD Adi Jimenez this is the plan that was discussed at your appointment.    Stop xarelto until urology work up is done    Referral placed to see a urologist     I am including additional information on these things and our contact information if you have any questions or concerns.   Please do not hesitate to reach out to us if you felt we did not answer your questions or you are unsure of the treatment plan after your visit today. Our number is [phone].Thank you for trusting us with your care.         Again thank you for your time.

## 2023-05-16 NOTE — NURSING NOTE
[facility]        Patient is here for a follow up  to discuss URINE IN HER BLOOD. She stated that it started a week ago. The past 24-48 hours the urine has been in her blood with each use of the bathroom. She has not contacted any other provider.     She was seen by hematology. She never stopped the xarelto.     Pt is currently taking Xarelto.    /60   Pulse 80   Resp 16     The provider has been notified that the patient has no concerns.     Questions patient would like addressed today are: N/A.    Refills are needed: No    Has homecare services and agency name:  Kaleigh Syed RN

## 2023-05-16 NOTE — TELEPHONE ENCOUNTER
Caller: Raj     Provider: MD Adi Jimenez    Detailed reason for call: She is concerned about blood in her urine. She wonders if it is related to her blood thinner. I offered to send a message but she prefers to be seen. Scheduled for later this morning.    Best phone number to contact: [phone]       Florencio Bradford)  Obstetrics and Gynecology  [address] [place] [address]  Phone: [phone]  Fax: [phone]  Follow Up Time: Urgent

## 2023-05-17 NOTE — PROGRESS NOTES
VASCULAR AND INTERVENTIONAL OUTPATIENT CONSULT OR VISIT  PHYSICIAN: Adi Jimenez MD  ESTABLISHED PATIENT    LOCATION: [facility]    Raj Kim   Medical Record #:  [id number]  YOB: 1950  Age:  72 year old     Date of Service: 5/16/2023    PRIMARY CARE PROVIDER: Lorenzo Dunn    Reason for visit: Right upper extremity deep vein thrombosis     IMPRESSION: 72-year-old female with first lifetime episode of venous thromboembolism with extensive right upper extremity deep vein thrombosis diagnosed on ultrasound study dated 12/22/2021.  Her identified risk factors at that time include estrogen replacement therapy as well as receiving 3 Covid vaccinations.   Estrogen replacement therapy has been discontinued.  She remains asymptomatic despise the diffuse involvement of right upper extremity veins.  The patient was seen by hematology on 10/12/22 who did not feel that long-term anticoagulation would provide additional benefit, however, the patient continued with anticoagulation.  Recently, the patient has developed hematuria over the past week.  She denies any flank, fevers or dysuria.     RECOMMENDATION:    1.  At this point, the risks of anticoagulation outweigh the potential benefits in the setting of hematuria.  I would suggest discontinuation of anticoagulation.  We will provide a referral to [facility] for work-up of hematuria.     Plan for a clinic and ultrasound follow-up in September.     HPI:  Raj Kim is a 72 year old female who was seen today in follow up for right upper extremity deep vein thrombosis.  The patient reports she incidentally noted a palpable abnormality in her right upper extremity which prompted a right upper extremity venous duplex on 10/22/2021. Imaging at that time demonstrated significant thrombus involving the right brachial, cephalic, basilic, axillary, subclavian and internal jugular veins.  She was subsequently started on anticoagulation with  Xarelto was tolerating the medication well.  She previously reported blood streaks stools and was worked up by GI. Recently, the patient has developed hematuria over the past week.  She initially noted blood in her urine which occurred once a day, however, she now notices blood each time she urinates.  She denies flank pain, fevers or dysuria.  No recent falls identified.  The patient remains asymptomatic in her right upper extremity. No shortness of breath, dyspnea or chest pain.      PHH:    Past Medical History:   Diagnosis Date     Acute deep vein thrombosis (DVT) of axillary vein (H)      Acute deep vein thrombosis (DVT) of axillary vein (H) 12/22/2021     Fecal incontinence 07/27/2020     Hormone replacement therapy      Hx of radiation therapy      Lyme disease 7/24/2016     Osteopenia of multiple sites 05/02/2019     Ovarian cancer (H) 01/01/1980     Peripheral neuropathy, secondary to drugs or chemicals      Radiation colitis      Sensorineural hearing loss, bilateral 05/02/2019     Vitamin B 12 deficiency         Past Surgical History:   Procedure Laterality Date     FINGER SURGERY Bilateral 01/01/2016    [facility]     HYSTERECTOMY  01/01/1980     LAPAROSCOPIC CHOLECYSTECTOMY  2008     LAPAROTOMY EXPLORATORY  1980    ovary/omentum abnormalities     OOPHORECTOMY Bilateral 01/01/1980       ALLERGIES:  Acetaminophen, Celecoxib, Cephalosporins, Hydrocodone, Hydrocodone-acetaminophen, Nabumetone, and Penicillins    MEDS:    Current Outpatient Medications:      betamethasone dipropionate (DIPROLENE) 0.05 % ointment, Apply topically as needed, Disp: , Rfl:      calcium citrate-vitamin D3 (CITRACAL + D) 315-250 mg-unit per tablet, [CALCIUM CITRATE-VITAMIN D3 (CITRACAL + D) 315-250 MG-UNIT PER TABLET] Take 1 tablet by mouth daily., Disp: , Rfl:      cholecalciferol, vitamin D3, (VITAMIN D3) 2,000 unit capsule, [CHOLECALCIFEROL, VITAMIN D3, (VITAMIN D3) 2,000 UNIT CAPSULE] Take 5,000 Units by mouth  daily.       , Disp: , Rfl:      cyanocobalamin, vitamin B-12, 1,000 mcg Subl, [CYANOCOBALAMIN, VITAMIN B-12, 1,000 MCG SUBL] Place 1,000 mcg under the tongue daily., Disp: , Rfl:      desonide (DESOWEN) 0.05 % cream, Apply topically as needed, Disp: , Rfl:      gabapentin (NEURONTIN) 100 MG capsule, Take 2 capsules by mouth every 24 hours, Disp: , Rfl:      GABAPENTIN, ONCE-DAILY, PO, Take 200 mg by mouth daily, Disp: , Rfl:      Lactobacillus rhamnosus GG (CULTURELLE) 10-15 Billion cell capsule, [LACTOBACILLUS RHAMNOSUS GG (CULTURELLE) 10-15 BILLION CELL CAPSULE] Take 1 capsule by mouth daily., Disp: , Rfl:      Magnesium 400 MG TABS, Take by mouth daily, Disp: , Rfl:      multivitamin with minerals (THERA-M) 9 mg iron-400 mcg Tab tablet, [MULTIVITAMIN WITH MINERALS (THERA-M) 9 MG IRON-400 MCG TAB TABLET] Take 1 tablet by mouth daily., Disp: , Rfl:      psyllium with dextrose (METAMUCIL JAR) powder, 1 teaspoon daily, Disp: , Rfl:      rivaroxaban ANTICOAGULANT (XARELTO ANTICOAGULANT) 20 MG TABS tablet, TAKE 1 TABLET (20MG) BY MOUTH DAILY WITH DINNER. Strength: 20 mg, Disp: 90 tablet, Rfl: 1     TART CHERRY PO, Take 2 capsules by mouth daily, Disp: , Rfl:      valACYclovir (VALTREX) 500 MG tablet, Take 500 mg by mouth as needed, Disp: , Rfl:     SOCIAL HABITS:    History   Smoking Status     Former     Packs/day: 0.50     Years: 10.00     Types: Cigarettes     Quit date: 1987   Smokeless Tobacco     Never     Social History    Substance and Sexual Activity      Alcohol use: Yes        Comment: Alcoholic Drinks/day: occasional      History   Drug Use Unknown       FAMILY HISTORY:    Family History   Problem Relation Age of Onset     Arthritis Mother      Dementia Mother      Diabetes Father      Bladder Cancer Sister      No Known Problems Sister      Diabetes Type 2  Brother      Breast Cancer Maternal Grandmother      Colon Cancer Maternal Aunt      Breast Cancer Maternal Aunt        ADVANCE CARE DIRECTIVES:     Advance care directives reviewed in the chart and no changes made.     PE:  /60   Pulse 80   Resp 16   Wt Readings from Last 1 Encounters:   02/28/23 130 lb 12.8 oz (59.3 kg)     There is no height or weight on file to calculate BMI.    EXAM:  GENERAL: This is a well-developed 72 year old female who appears her stated age  EYES: Grossly normal.  MOUTH: Buccal mucosa normal   MUSCULOSKELETAL: Grossly normal and both lower extremities are intact.  HEME/LYMPH: No lymphedema  NEUROLOGIC: Focally intact, Alert and oriented x 3.   PSYCH: appropriate affect  INTEGUMENT: No open lesions or ulcers    DIAGNOSTIC STUDIES:     Images:  No results found.    LABS:      Sodium   Date Value Ref Range Status   05/26/2022 143 136 - 145 mmol/L Final   07/28/2021 140 136 - 145 mmol/L Final   07/27/2020 141 136 - 145 mmol/L Final     Urea Nitrogen   Date Value Ref Range Status   05/26/2022 19 8 - 22 mg/dL Final   07/28/2021 17 8 - 22 mg/dL Final   07/27/2020 14 8 - 22 mg/dL Final     Hemoglobin   Date Value Ref Range Status   05/04/2023 11.3 (L) 11.7 - 15.7 g/dL Final   02/28/2023 11.3 (L) 11.7 - 15.7 g/dL Final   05/26/2022 13.0 11.7 - 15.7 g/dL Final     Platelet Count   Date Value Ref Range Status   05/26/2022 325 150 - 450 10e3/uL Final   07/28/2021 270 150 - 450 10e3/uL Final   07/27/2020 305 140 - 440 thou/uL Final       This was a in person visit in which 30 minutes of  total time was spent (either in face-to-face or non-face-to-face time).    Adi Jimenez MD, [facility]  VASCULAR AND INTERVENTIONAL PHYSICIAN  VASCULAR MEDICINE  INTERNAL MEDICINE  PAGER: [phone]  CALL SERVICE: [phone]

## 2023-05-25 ENCOUNTER — TRANSFERRED RECORDS (OUTPATIENT)
Dept: HEALTH INFORMATION MANAGEMENT | Facility: CLINIC | Age: 73
End: 2023-05-25
Payer: COMMERCIAL

## 2023-05-26 ENCOUNTER — TRANSFERRED RECORDS (OUTPATIENT)
Dept: HEALTH INFORMATION MANAGEMENT | Facility: CLINIC | Age: 73
End: 2023-05-26
Payer: COMMERCIAL

## 2023-06-05 ENCOUNTER — TRANSFERRED RECORDS (OUTPATIENT)
Dept: HEALTH INFORMATION MANAGEMENT | Facility: CLINIC | Age: 73
End: 2023-06-05
Payer: COMMERCIAL

## 2023-06-06 ENCOUNTER — TRANSFERRED RECORDS (OUTPATIENT)
Dept: HEALTH INFORMATION MANAGEMENT | Facility: CLINIC | Age: 73
End: 2023-06-06
Payer: COMMERCIAL

## 2023-06-06 ENCOUNTER — MEDICAL CORRESPONDENCE (OUTPATIENT)
Dept: HEALTH INFORMATION MANAGEMENT | Facility: CLINIC | Age: 73
End: 2023-06-06
Payer: COMMERCIAL

## 2023-06-07 ENCOUNTER — OFFICE VISIT (OUTPATIENT)
Dept: INTERNAL MEDICINE | Facility: CLINIC | Age: 73
End: 2023-06-07
Payer: COMMERCIAL

## 2023-06-07 VITALS
TEMPERATURE: 98 F | RESPIRATION RATE: 20 BRPM | WEIGHT: 124.6 LBS | DIASTOLIC BLOOD PRESSURE: 65 MMHG | BODY MASS INDEX: 20.76 KG/M2 | HEIGHT: 65 IN | OXYGEN SATURATION: 100 % | SYSTOLIC BLOOD PRESSURE: 123 MMHG | HEART RATE: 87 BPM

## 2023-06-07 DIAGNOSIS — E55.9 VITAMIN D DEFICIENCY: ICD-10-CM

## 2023-06-07 DIAGNOSIS — N20.0 CALCULUS OF KIDNEY: ICD-10-CM

## 2023-06-07 DIAGNOSIS — C56.9 MALIGNANT NEOPLASM OF OVARY, UNSPECIFIED LATERALITY (H): ICD-10-CM

## 2023-06-07 DIAGNOSIS — E73.9 LACTOSE INTOLERANCE: ICD-10-CM

## 2023-06-07 DIAGNOSIS — E53.8 VITAMIN B12 DEFICIENCY (NON ANEMIC): ICD-10-CM

## 2023-06-07 DIAGNOSIS — K63.5 BENIGN POLYP OF LARGE INTESTINE: ICD-10-CM

## 2023-06-07 DIAGNOSIS — Z01.818 PREOPERATIVE EXAMINATION: Primary | ICD-10-CM

## 2023-06-07 DIAGNOSIS — I45.10 RBBB: ICD-10-CM

## 2023-06-07 DIAGNOSIS — E78.5 HYPERLIPIDEMIA LDL GOAL <100: ICD-10-CM

## 2023-06-07 DIAGNOSIS — I82.A11 ACUTE DEEP VEIN THROMBOSIS (DVT) OF AXILLARY VEIN OF RIGHT UPPER EXTREMITY (H): ICD-10-CM

## 2023-06-07 DIAGNOSIS — I89.0 ACQUIRED LYMPHEDEMA OF LEG: ICD-10-CM

## 2023-06-07 DIAGNOSIS — G60.9 HEREDITARY AND IDIOPATHIC PERIPHERAL NEUROPATHY: ICD-10-CM

## 2023-06-07 PROCEDURE — 93010 ELECTROCARDIOGRAM REPORT: CPT | Performed by: INTERNAL MEDICINE

## 2023-06-07 PROCEDURE — 93005 ELECTROCARDIOGRAM TRACING: CPT | Performed by: NURSE PRACTITIONER

## 2023-06-07 PROCEDURE — 99214 OFFICE O/P EST MOD 30 MIN: CPT | Performed by: NURSE PRACTITIONER

## 2023-06-07 ASSESSMENT — PAIN SCALES - GENERAL: PAINLEVEL: NO PAIN (0)

## 2023-06-07 NOTE — PROGRESS NOTES
[address] [address]  Phone: [phone]  Fax: [phone]  Primary Provider: Lorenzo Dunn  Pre-op Performing Provider: HEIDE KIRK      PREOPERATIVE EVALUATION:  Today's date: 6/7/2023    Raj Kim is a 72 year old female who presents for a preoperative evaluation.      6/7/2023     3:22 PM   Additional Questions   Roomed by STEPHEN Gibbs   Accompanied by MARGARET     Surgical Information:  Surgery/Procedure: (HOLMIUM) CYSTOSCOPY RIGHT URETEROSCOPY WITH HOLMIUM LASER,  RIGHT STENT PLACEMENT, RIGHT RETROGRADE  Surgery Location: [facility]  Surgeon: Emory Robins MD  Surgery Date: 6/8/2023  Time of Surgery: 1:40 pm   Where patient plans to recover: At home with family  Fax number for surgical facility: [phone]    Assessment & Plan     The proposed surgical procedure is considered INTERMEDIATE risk.    Problem List Items Addressed This Visit        Nervous and Auditory    Peripheral Neuropathy       Digestive    Vitamin B12 deficiency (non anemic)    Benign polyp of large intestine    Vitamin D deficiency       Endocrine    Hyperlipidemia LDL goal <100    Papillary Carcinoma Of The Ovary       Circulatory    RBBB       Musculoskeletal and Integumentary    Acquired lymphedema of leg       Other    Lactose intolerance   Other Visit Diagnoses     Preoperative examination    -  Primary    Relevant Orders    EKG 12-lead, tracing only (Completed)    Calculus of kidney        Acute deep vein thrombosis (DVT) of axillary vein of right upper extremity (H)                     Risks and Recommendations:  The patient has the following additional risks and recommendations for perioperative complications:   - No identified additional risk factors other than previously addressed    Antiplatelet or Anticoagulation Medication Instructions:   - Patient is on no antiplatelet or anticoagulation medications.    Additional Medication  Instructions:  Hold all medications morning of procedure    RECOMMENDATION:  APPROVAL GIVEN to proceed with proposed procedure, without further diagnostic evaluation.    Review of prior external note(s) from - CareEverywhere information from Urology reviewed        Subjective       HPI related to upcoming procedure: She was noted to have a previous episodes of hematuria was seen by urology CT abdomen pelvis was completed patient noted to have a 8 mm proximal right ureteral calculus with hydronephrosis.        6/7/2023     9:56 AM   Preop Questions   1. Have you ever had a heart attack or stroke? No   2. Have you ever had surgery on your heart or blood vessels, such as a stent placement, a coronary artery bypass, or surgery on an artery in your head, neck, heart, or legs? No   3. Do you have chest pain with activity? No   4. Do you have a history of  heart failure? No   5. Do you currently have a cold, bronchitis or symptoms of other infection? No   6. Do you have a cough, shortness of breath, or wheezing? No   7. Do you or anyone in your family have previous history of blood clots? YES -    8. Do you or does anyone in your family have a serious bleeding problem such as prolonged bleeding following surgeries or cuts? No   9. Have you ever had problems with anemia or been told to take iron pills? No   10. Have you had any abnormal blood loss such as black, tarry or bloody stools, or abnormal vaginal bleeding? No   11. Have you ever had a blood transfusion? No   12. Are you willing to have a blood transfusion if it is medically needed before, during, or after your surgery? Yes   13. Have you or any of your relatives ever had problems with anesthesia? YES -    14. Do you have sleep apnea, excessive snoring or daytime drowsiness? No   15. Do you have any artifical heart valves or other implanted medical devices like a pacemaker, defibrillator, or continuous glucose monitor? No   16. Do you have artificial joints? No    17. Are you allergic to latex? No       Health Care Directive:  Patient does not have a Health Care Directive or Living Will: Discussed advance care planning with patient; however, patient declined at this time.    Preoperative Review of :   reviewed - no record of controlled substances prescribed.      Status of Chronic Conditions:  See problem list for active medical problems.  Problems all longstanding and stable, except as noted/documented.  See ROS for pertinent symptoms related to these conditions.      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     Priority: Medium     Leg cramps 05/26/2022     Priority: Medium     Vitamin D deficiency 05/26/2022     Priority: Medium     Need for hepatitis C screening test 12/24/2021     Priority: Medium     Fecal incontinence 07/27/2020     Priority: Medium     After radiation for ovarian cancer       Vitamin B12 deficiency (non anemic)      Priority: Medium     Created by Conversion         Sensorineural hearing loss, bilateral 05/02/2019     Priority: Medium     Kyphosis (acquired) (postural) 07/17/2017     Priority: Medium     Peripheral Neuropathy      Priority: Medium     Created by [facility] Annotation: Feb 24 2012  3:52PM - Lorenzo Pelletier: left   leg/lower   extremity; extensive w/u negative (Dr. Wilks)  Replacement Utility updated for latest IMO load         Radiation Therapy      Priority: Medium     Created by [facility] Annotation: Feb 5 2009  9:32AM - Lorenzo Pelletier: low grade   ovarian   malignancy  Replacement Utility updated for latest IMO load         Lactose intolerance 05/23/2016     Priority: Medium     RBBB 05/23/2016     Priority: Medium     Benign polyp of large intestine 05/23/2016     Priority: Medium     2011- tubular adenoma. Every 5 yr screen          Benign neoplasm of transverse colon 10/07/2015     Priority: Medium     Personal history of ovarian cancer 06/03/2015     Priority: Medium     Venous stasis 06/03/2015     Priority: Medium     Incontinence of feces 05/11/2015     Priority: Medium     Varicose veins 03/02/2015     Priority: Medium     Acquired lymphedema of leg 03/02/2015     Priority: Medium     Hyperlipidemia LDL goal <100      Priority: Medium     Created by [facility] Annotation: Feb 5 2008  3:54PM - Lorenzo Pelletier: mild increase   triglycerides         Papillary Carcinoma Of The Ovary      Priority: Medium     Created by [facility] Annotation: Feb 5 2009  9:32AM - Lorenzo Pelletier: low grade          Past Medical History:   Diagnosis Date     Acute deep vein thrombosis (DVT) of axillary vein (H)      Acute deep vein thrombosis (DVT) of axillary vein (H) 12/22/2021     Fecal incontinence 07/27/2020     Hormone replacement therapy      Hx of radiation therapy      Lyme disease 07/24/2016     Osteopenia of multiple sites 05/02/2019     Ovarian cancer (H) 01/01/1980     Peripheral neuropathy, secondary to drugs or chemicals      Radiation colitis      Sensorineural hearing loss, bilateral 05/02/2019     Vitamin B 12 deficiency      Past Surgical History:   Procedure Laterality Date     FINGER SURGERY Bilateral 01/01/2016    Dr. Tubbs [facility]     FOOT TENDON SURGERY Right      HYSTERECTOMY  01/01/1980     LAPAROSCOPIC CHOLECYSTECTOMY  2008     LAPAROTOMY EXPLORATORY  1980    ovary/omentum abnormalities     OOPHORECTOMY Bilateral 01/01/1980     Current Outpatient Medications   Medication Sig Dispense Refill     betamethasone dipropionate (DIPROLENE) 0.05 % ointment Apply topically as needed       calcium citrate-vitamin D3 (CITRACAL + D) 315-250 mg-unit per tablet [CALCIUM CITRATE-VITAMIN D3 (CITRACAL + D) 315-250 MG-UNIT PER TABLET] Take 1 tablet by mouth daily.       cholecalciferol, vitamin D3, (VITAMIN D3) 2,000 unit  capsule [CHOLECALCIFEROL, VITAMIN D3, (VITAMIN D3) 2,000 UNIT CAPSULE] Take 5,000 Units by mouth daily.              cyanocobalamin, vitamin B-12, 1,000 mcg Subl [CYANOCOBALAMIN, VITAMIN B-12, 1,000 MCG SUBL] Place 1,000 mcg under the tongue daily.       desonide (DESOWEN) 0.05 % cream Apply topically as needed       gabapentin (NEURONTIN) 100 MG capsule Take 2 capsules by mouth every 24 hours       Lactobacillus rhamnosus GG (CULTURELLE) 10-15 Billion cell capsule [LACTOBACILLUS RHAMNOSUS GG (CULTURELLE) 10-15 BILLION CELL CAPSULE] Take 1 capsule by mouth daily.       Magnesium 400 MG TABS Take by mouth daily       multivitamin with minerals (THERA-M) 9 mg iron-400 mcg Tab tablet [MULTIVITAMIN WITH MINERALS (THERA-M) 9 MG IRON-400 MCG TAB TABLET] Take 1 tablet by mouth daily.       psyllium with dextrose (METAMUCIL JAR) powder 1 teaspoon daily       TART CHERRY PO Take 2 capsules by mouth daily       valACYclovir (VALTREX) 500 MG tablet Take 500 mg by mouth as needed         Allergies   Allergen Reactions     Acetaminophen Diarrhea     Celecoxib Headache     Cephalosporins Hives     Hydrocodone Diarrhea     Hydrocodone-Acetaminophen Headache     Nabumetone Diarrhea     Penicillins Hives        Social History     Tobacco Use     Smoking status: Former     Packs/day: 0.50     Years: 10.00     Pack years: 5.00     Types: Cigarettes     Quit date: 1987     Years since quittin.4     Smokeless tobacco: Never     Tobacco comments:     Unsure how much per day   Vaping Use     Vaping status: Never Used     Passive vaping exposure: Yes   Substance Use Topics     Alcohol use: Yes     Comment: Occasionally     Family History   Problem Relation Age of Onset     Arthritis Mother      Dementia Mother      Diabetes Father      Bladder Cancer Sister      Other Cancer Sister      No Known Problems Sister      Diabetes Type 2  Brother      Breast Cancer Maternal Grandmother      Colon Cancer Maternal Aunt      Breast Cancer  "Maternal Aunt      Diabetes Brother      Colon Cancer Other      History   Drug Use Unknown         Objective     /65 (BP Location: Right arm, Patient Position: Sitting, Cuff Size: Adult Regular)   Pulse 87   Temp 98  F (36.7  C) (Oral)   Resp 20   Ht 1.657 m (5' 5.24\")   Wt 56.5 kg (124 lb 9.6 oz)   LMP  (LMP Unknown)   SpO2 100%   Breastfeeding No   BMI 20.58 kg/m      Physical Exam    GENERAL APPEARANCE: healthy, alert and no distress     EYES: EOMI, PERRL     HENT: head is attraumatic, normo mouth without ulcers or lesions     NECK: no adenopathy      RESP: lungs clear to auscultation - no rales, rhonchi or wheezes     CV: regular rates and rhythm, normal S1 S2, no S3 or S4 and no murmur, click or rub     ABDOMEN:  soft, nontender, no HSM or masses and bowel sounds normal     MS: extremities normal- no gross deformities noted, no evidence of inflammation in joints, FROM in all extremities.     SKIN: no suspicious lesions or rashes     NEURO: Normal strength and tone, sensory exam grossly normal, mentation intact and speech normal     PSYCH: mentation appears normal. and affect normal/bright     LYMPHATICS: No cervical adenopathy    Recent Labs   Lab Test 05/04/23  1509 02/28/23  1021 05/26/22  1004 07/28/21  1100   HGB 11.3* 11.3* 13.0 13.0   PLT  --   --  325 270   NA  --   --  143 140   POTASSIUM  --   --  4.6 4.9   CR  --   --  0.86 0.79      EKG 6.7.2023: Sinus rhythm     Revised Cardiac Risk Index (RCRI):  The patient has the following serious cardiovascular risks for perioperative complications:   - No serious cardiac risks = 0 points     RCRI Interpretation: 1 point: Class II (low risk - 0.9% complication rate)           Signed Electronically by: Melanie Russell NP  Copy of this evaluation report is provided to requesting physician.      "

## 2023-06-08 LAB
ATRIAL RATE - MUSE: 77 BPM
DIASTOLIC BLOOD PRESSURE - MUSE: NORMAL MMHG
INTERPRETATION ECG - MUSE: NORMAL
P AXIS - MUSE: 57 DEGREES
PR INTERVAL - MUSE: 116 MS
QRS DURATION - MUSE: 102 MS
QT - MUSE: 378 MS
QTC - MUSE: 427 MS
R AXIS - MUSE: 25 DEGREES
SYSTOLIC BLOOD PRESSURE - MUSE: NORMAL MMHG
T AXIS - MUSE: 48 DEGREES
VENTRICULAR RATE- MUSE: 77 BPM

## 2023-06-23 ENCOUNTER — ANCILLARY PROCEDURE (OUTPATIENT)
Dept: BONE DENSITY | Facility: CLINIC | Age: 73
End: 2023-06-23
Attending: INTERNAL MEDICINE
Payer: COMMERCIAL

## 2023-06-23 PROCEDURE — 77080 DXA BONE DENSITY AXIAL: CPT | Mod: TC | Performed by: PHYSICIAN ASSISTANT

## 2023-06-27 PROBLEM — M81.0 OSTEOPOROSIS: Status: ACTIVE | Noted: 2023-06-27

## 2023-06-29 ENCOUNTER — TRANSFERRED RECORDS (OUTPATIENT)
Dept: HEALTH INFORMATION MANAGEMENT | Facility: CLINIC | Age: 73
End: 2023-06-29
Payer: COMMERCIAL

## 2023-07-11 ENCOUNTER — ANCILLARY PROCEDURE (OUTPATIENT)
Dept: MAMMOGRAPHY | Facility: CLINIC | Age: 73
End: 2023-07-11
Attending: INTERNAL MEDICINE
Payer: COMMERCIAL

## 2023-07-11 DIAGNOSIS — Z12.31 VISIT FOR SCREENING MAMMOGRAM: ICD-10-CM

## 2023-07-11 PROCEDURE — 77067 SCR MAMMO BI INCL CAD: CPT | Mod: TC | Performed by: STUDENT IN AN ORGANIZED HEALTH CARE EDUCATION/TRAINING PROGRAM

## 2023-07-11 PROCEDURE — 77063 BREAST TOMOSYNTHESIS BI: CPT | Mod: TC | Performed by: STUDENT IN AN ORGANIZED HEALTH CARE EDUCATION/TRAINING PROGRAM

## 2023-07-12 ASSESSMENT — ENCOUNTER SYMPTOMS
COUGH: 0
SORE THROAT: 0
WEAKNESS: 1
ARTHRALGIAS: 0
DIZZINESS: 0
HEMATOCHEZIA: 0
NAUSEA: 0
HEMATURIA: 0
JOINT SWELLING: 0
EYE PAIN: 0
SHORTNESS OF BREATH: 0
DIARRHEA: 0
CHILLS: 0
BREAST MASS: 0
HEARTBURN: 0
CONSTIPATION: 0
PALPITATIONS: 0
DYSURIA: 0
HEADACHES: 0
PARESTHESIAS: 0
FREQUENCY: 0
ABDOMINAL PAIN: 0
MYALGIAS: 0
NERVOUS/ANXIOUS: 0
FEVER: 0

## 2023-07-12 ASSESSMENT — ACTIVITIES OF DAILY LIVING (ADL): CURRENT_FUNCTION: NO ASSISTANCE NEEDED

## 2023-07-19 ENCOUNTER — OFFICE VISIT (OUTPATIENT)
Dept: INTERNAL MEDICINE | Facility: CLINIC | Age: 73
End: 2023-07-19
Payer: COMMERCIAL

## 2023-07-19 VITALS
RESPIRATION RATE: 19 BRPM | HEART RATE: 79 BPM | HEIGHT: 65 IN | TEMPERATURE: 97.2 F | DIASTOLIC BLOOD PRESSURE: 71 MMHG | BODY MASS INDEX: 20.66 KG/M2 | OXYGEN SATURATION: 97 % | SYSTOLIC BLOOD PRESSURE: 118 MMHG | WEIGHT: 124 LBS

## 2023-07-19 DIAGNOSIS — E78.5 HYPERLIPIDEMIA LDL GOAL <100: ICD-10-CM

## 2023-07-19 DIAGNOSIS — M81.0 AGE-RELATED OSTEOPOROSIS WITHOUT CURRENT PATHOLOGICAL FRACTURE: ICD-10-CM

## 2023-07-19 DIAGNOSIS — E53.8 VITAMIN B12 DEFICIENCY (NON ANEMIC): ICD-10-CM

## 2023-07-19 DIAGNOSIS — E61.1 IRON DEFICIENCY: ICD-10-CM

## 2023-07-19 DIAGNOSIS — C56.9 MALIGNANT NEOPLASM OF OVARY, UNSPECIFIED LATERALITY (H): ICD-10-CM

## 2023-07-19 DIAGNOSIS — Z86.0100 HISTORY OF COLONIC POLYPS: ICD-10-CM

## 2023-07-19 DIAGNOSIS — I82.A11 ACUTE DEEP VEIN THROMBOSIS (DVT) OF AXILLARY VEIN OF RIGHT UPPER EXTREMITY (H): ICD-10-CM

## 2023-07-19 DIAGNOSIS — Z85.43 PERSONAL HISTORY OF OVARIAN CANCER: ICD-10-CM

## 2023-07-19 DIAGNOSIS — Z92.29 PERSONAL HISTORY OF OTHER DRUG THERAPY: ICD-10-CM

## 2023-07-19 DIAGNOSIS — G62.2 TOXIC NEUROPATHY (H): Primary | ICD-10-CM

## 2023-07-19 DIAGNOSIS — E05.90 HYPERTHYROIDISM: ICD-10-CM

## 2023-07-19 DIAGNOSIS — R63.4 WEIGHT LOSS: ICD-10-CM

## 2023-07-19 DIAGNOSIS — Z92.29 PERSONAL HISTORY OF DRUG THERAPY: ICD-10-CM

## 2023-07-19 LAB
ALBUMIN SERPL BCG-MCNC: 4.1 G/DL (ref 3.5–5.2)
ALP SERPL-CCNC: 105 U/L (ref 35–104)
ALT SERPL W P-5'-P-CCNC: 20 U/L (ref 0–50)
ANION GAP SERPL CALCULATED.3IONS-SCNC: 7 MMOL/L (ref 7–15)
AST SERPL W P-5'-P-CCNC: 25 U/L (ref 0–45)
BASOPHILS # BLD AUTO: 0.1 10E3/UL (ref 0–0.2)
BASOPHILS NFR BLD AUTO: 1 %
BILIRUB SERPL-MCNC: 0.3 MG/DL
BUN SERPL-MCNC: 19.6 MG/DL (ref 8–23)
CALCIUM SERPL-MCNC: 9.9 MG/DL (ref 8.8–10.2)
CHLORIDE SERPL-SCNC: 106 MMOL/L (ref 98–107)
CHOLEST SERPL-MCNC: 142 MG/DL
CREAT SERPL-MCNC: 1.08 MG/DL (ref 0.51–0.95)
DEPRECATED HCO3 PLAS-SCNC: 26 MMOL/L (ref 22–29)
EOSINOPHIL # BLD AUTO: 0.2 10E3/UL (ref 0–0.7)
EOSINOPHIL NFR BLD AUTO: 3 %
ERYTHROCYTE [DISTWIDTH] IN BLOOD BY AUTOMATED COUNT: 14.4 % (ref 10–15)
FERRITIN SERPL-MCNC: 45 NG/ML (ref 11–328)
GFR SERPL CREATININE-BSD FRML MDRD: 55 ML/MIN/1.73M2
GLUCOSE SERPL-MCNC: 102 MG/DL (ref 70–99)
HCT VFR BLD AUTO: 36.2 % (ref 35–47)
HDLC SERPL-MCNC: 47 MG/DL
HGB BLD-MCNC: 11.6 G/DL (ref 11.7–15.7)
IMM GRANULOCYTES # BLD: 0 10E3/UL
IMM GRANULOCYTES NFR BLD: 0 %
IRON BINDING CAPACITY (ROCHE): 338 UG/DL (ref 240–430)
IRON SATN MFR SERPL: 18 % (ref 15–46)
IRON SERPL-MCNC: 60 UG/DL (ref 37–145)
LDLC SERPL CALC-MCNC: 78 MG/DL
LYMPHOCYTES # BLD AUTO: 0.9 10E3/UL (ref 0.8–5.3)
LYMPHOCYTES NFR BLD AUTO: 13 %
MCH RBC QN AUTO: 27.8 PG (ref 26.5–33)
MCHC RBC AUTO-ENTMCNC: 32 G/DL (ref 31.5–36.5)
MCV RBC AUTO: 87 FL (ref 78–100)
MONOCYTES # BLD AUTO: 0.7 10E3/UL (ref 0–1.3)
MONOCYTES NFR BLD AUTO: 11 %
NEUTROPHILS # BLD AUTO: 5 10E3/UL (ref 1.6–8.3)
NEUTROPHILS NFR BLD AUTO: 72 %
NONHDLC SERPL-MCNC: 95 MG/DL
PLATELET # BLD AUTO: 338 10E3/UL (ref 150–450)
POTASSIUM SERPL-SCNC: 5.1 MMOL/L (ref 3.4–5.3)
PROT SERPL-MCNC: 7.2 G/DL (ref 6.4–8.3)
RBC # BLD AUTO: 4.18 10E6/UL (ref 3.8–5.2)
SODIUM SERPL-SCNC: 139 MMOL/L (ref 136–145)
TRIGL SERPL-MCNC: 85 MG/DL
TSH SERPL DL<=0.005 MIU/L-ACNC: 2.22 UIU/ML (ref 0.3–4.2)
VIT B12 SERPL-MCNC: 1736 PG/ML (ref 232–1245)
WBC # BLD AUTO: 7 10E3/UL (ref 4–11)

## 2023-07-19 PROCEDURE — 82607 VITAMIN B-12: CPT | Performed by: INTERNAL MEDICINE

## 2023-07-19 PROCEDURE — 80061 LIPID PANEL: CPT | Performed by: INTERNAL MEDICINE

## 2023-07-19 PROCEDURE — G0439 PPPS, SUBSEQ VISIT: HCPCS | Performed by: INTERNAL MEDICINE

## 2023-07-19 PROCEDURE — 82728 ASSAY OF FERRITIN: CPT | Performed by: INTERNAL MEDICINE

## 2023-07-19 PROCEDURE — 36415 COLL VENOUS BLD VENIPUNCTURE: CPT | Performed by: INTERNAL MEDICINE

## 2023-07-19 PROCEDURE — 83550 IRON BINDING TEST: CPT | Performed by: INTERNAL MEDICINE

## 2023-07-19 PROCEDURE — 85025 COMPLETE CBC W/AUTO DIFF WBC: CPT | Performed by: INTERNAL MEDICINE

## 2023-07-19 PROCEDURE — 80053 COMPREHEN METABOLIC PANEL: CPT | Performed by: INTERNAL MEDICINE

## 2023-07-19 PROCEDURE — 83540 ASSAY OF IRON: CPT | Performed by: INTERNAL MEDICINE

## 2023-07-19 PROCEDURE — 99214 OFFICE O/P EST MOD 30 MIN: CPT | Mod: 25 | Performed by: INTERNAL MEDICINE

## 2023-07-19 PROCEDURE — 84443 ASSAY THYROID STIM HORMONE: CPT | Performed by: INTERNAL MEDICINE

## 2023-07-19 ASSESSMENT — ENCOUNTER SYMPTOMS
FREQUENCY: 0
CONSTIPATION: 0
DIARRHEA: 0
SORE THROAT: 0
SHORTNESS OF BREATH: 0
HEADACHES: 0
HEMATURIA: 0
PARESTHESIAS: 0
FEVER: 0
HEARTBURN: 0
CHILLS: 0
ABDOMINAL PAIN: 0
COUGH: 0
NERVOUS/ANXIOUS: 0
MYALGIAS: 0
ARTHRALGIAS: 0
DYSURIA: 0
JOINT SWELLING: 0
BREAST MASS: 0
DIZZINESS: 0
EYE PAIN: 0
HEMATOCHEZIA: 0
PALPITATIONS: 0
WEAKNESS: 1
NAUSEA: 0

## 2023-07-19 ASSESSMENT — ACTIVITIES OF DAILY LIVING (ADL): CURRENT_FUNCTION: NO ASSISTANCE NEEDED

## 2023-07-19 NOTE — LETTER
July 20, 2023      Raj Kim  [address] [place] [address]        Dear ,    We are writing to inform you of your test results.  Your tests are all OK. The iron levels are good. You have a mild anemia, but no difficiency of iron or B12, and there are no abnormal cells.   It likely is a new normal for you.  We will continue to check it in the future.  The other tests are all good.  The minor abnormalities are not significant.     Resulted Orders   Iron and iron binding capacity   Result Value Ref Range    Iron 60 37 - 145 ug/dL    Iron Binding Capacity 338 240 - 430 ug/dL    Iron Sat Index 18 15 - 46 %   Ferritin   Result Value Ref Range    Ferritin 45 11 - 328 ng/mL   Vitamin B12   Result Value Ref Range    Vitamin B12 1,736 (H) 232 - 1,245 pg/mL   Comprehensive metabolic panel   Result Value Ref Range    Sodium 139 136 - 145 mmol/L    Potassium 5.1 3.4 - 5.3 mmol/L    Chloride 106 98 - 107 mmol/L    Carbon Dioxide (CO2) 26 22 - 29 mmol/L    Anion Gap 7 7 - 15 mmol/L    Urea Nitrogen 19.6 8.0 - 23.0 mg/dL    Creatinine 1.08 (H) 0.51 - 0.95 mg/dL    Calcium 9.9 8.8 - 10.2 mg/dL    Glucose 102 (H) 70 - 99 mg/dL    Alkaline Phosphatase 105 (H) 35 - 104 U/L    AST 25 0 - 45 U/L      Comment:      Reference intervals for this test were updated on 6/12/2023 to more accurately reflect our healthy population. There may be differences in the flagging of prior results with similar values performed with this method. Interpretation of those prior results can be made in the context of the updated reference intervals.    ALT 20 0 - 50 U/L      Comment:      Reference intervals for this test were updated on 6/12/2023 to more accurately reflect our healthy population. There may be differences in the flagging of prior results with similar values performed with this method. Interpretation of those prior results can be made in the context of the updated reference intervals.      Protein Total 7.2 6.4 - 8.3 g/dL     Albumin 4.1 3.5 - 5.2 g/dL    Bilirubin Total 0.3 <=1.2 mg/dL    GFR Estimate 55 (L) >60 mL/min/1.73m2   TSH with free T4 reflex   Result Value Ref Range    TSH 2.22 0.30 - 4.20 uIU/mL   Lipid panel reflex to direct LDL Fasting   Result Value Ref Range    Cholesterol 142 <200 mg/dL    Triglycerides 85 <150 mg/dL    Direct Measure HDL 47 (L) >=50 mg/dL    LDL Cholesterol Calculated 78 <=100 mg/dL    Non HDL Cholesterol 95 <130 mg/dL    Narrative    Cholesterol  Desirable:  <200 mg/dL    Triglycerides  Normal:  Less than 150 mg/dL  Borderline High:  150-199 mg/dL  High:  200-499 mg/dL  Very High:  Greater than or equal to 500 mg/dL    Direct Measure HDL  Female:  Greater than or equal to 50 mg/dL   Male:  Greater than or equal to 40 mg/dL    LDL Cholesterol  Desirable:  <100mg/dL  Above Desirable:  100-129 mg/dL   Borderline High:  130-159 mg/dL   High:  160-189 mg/dL   Very High:  >= 190 mg/dL    Non HDL Cholesterol  Desirable:  130 mg/dL  Above Desirable:  130-159 mg/dL  Borderline High:  160-189 mg/dL  High:  190-219 mg/dL  Very High:  Greater than or equal to 220 mg/dL   CBC with platelets and differential   Result Value Ref Range    WBC Count 7.0 4.0 - 11.0 10e3/uL    RBC Count 4.18 3.80 - 5.20 10e6/uL    Hemoglobin 11.6 (L) 11.7 - 15.7 g/dL    Hematocrit 36.2 35.0 - 47.0 %    MCV 87 78 - 100 fL    MCH 27.8 26.5 - 33.0 pg    MCHC 32.0 31.5 - 36.5 g/dL    RDW 14.4 10.0 - 15.0 %    Platelet Count 338 150 - 450 10e3/uL    % Neutrophils 72 %    % Lymphocytes 13 %    % Monocytes 11 %    % Eosinophils 3 %    % Basophils 1 %    % Immature Granulocytes 0 %    Absolute Neutrophils 5.0 1.6 - 8.3 10e3/uL    Absolute Lymphocytes 0.9 0.8 - 5.3 10e3/uL    Absolute Monocytes 0.7 0.0 - 1.3 10e3/uL    Absolute Eosinophils 0.2 0.0 - 0.7 10e3/uL    Absolute Basophils 0.1 0.0 - 0.2 10e3/uL    Absolute Immature Granulocytes 0.0 <=0.4 10e3/uL       If you have any questions or concerns, please call the clinic at the number listed  above.       Sincerely,      Lorenzo Dunn MD

## 2023-07-19 NOTE — PATIENT INSTRUCTIONS
Personalized Prevention Plan  You are due for the preventive services outlined below.  Your care team is available to assist you in scheduling these services.  If you have already completed any of these items, please share that information with your care team to update in your medical record.  Health Maintenance Due   Topic Date Due     ANNUAL REVIEW OF HM ORDERS  Never done     COVID-19 Vaccine (6 - Pfizer series) 01/22/2023     Annual Wellness Visit  05/26/2023

## 2023-07-19 NOTE — PROGRESS NOTES
"SUBJECTIVE:   Raj is a 72 year old who presents for Preventive Visit.    HPI:  Has stent in place for nephrolithiasis, doing well.  Recent colon thickening on CT, had sigmoidoscopy 10/2022, and colonoscopy 12/21 with small polyps.  Weight loss 11 pounds last year.  Mildly low hemoglobin. Stopped xarelto - was taking for axillary thrombosis, 2021, as she was having hematuria.  No further hematuria.  Appetite is OK, sleeping is OK.  Has been off estrogen for awhile, most recent DXA scan revealed osteoporosis.  No unusual back pain, no rectal bleeding.  Her chronic, fecal urgency incontinence is unchanged.         7/19/2023     8:59 AM   Additional Questions   Roomed by Jannet BETANCOURT CMA         7/19/2023     8:59 AM   Patient Reported Additional Medications   Patient reports taking the following new medications flomax     Are you in the first 12 months of your Medicare coverage?  No    Imm/Inj  Associated symptoms include weakness. Pertinent negatives include no abdominal pain, arthralgias, chest pain, chills, congestion, coughing, fever, headaches, joint swelling, myalgias, nausea, rash or sore throat.   Healthy Habits:     In general, how would you rate your overall health?  Fair    Frequency of exercise:  None    Do you usually eat at least 4 servings of fruit and vegetables a day, include whole grains    & fiber and avoid regularly eating high fat or \"junk\" foods?  No    Taking medications regularly:  Yes    Ability to successfully perform activities of daily living:  No assistance needed    Home Safety:  No safety concerns identified    Hearing Impairment:  No hearing concerns    In the past 6 months, have you been bothered by leaking of urine?  No    In general, how would you rate your overall mental or emotional health?  Fair    Additional concerns today:  Yes    Have you ever done Advance Care Planning? (For example, a Health Directive, POLST, or a discussion with a medical provider or your loved ones about your " wishes): Yes, advance care planning is on file.    Hearing - has aids.     Fall risk  Fallen 2 or more times in the past year?: No  Any fall with injury in the past year?: No  click delete button to remove this line now  Cognitive Screening   1) Repeat 3 items (Leader, Season, Table)    2) Clock draw: NORMAL  3) 3 item recall: Recalls 3 objects  Results: Normal    Mini-CogTM Copyright JEWEL Wiley. Licensed by the author for use in [facility]; reprinted with permission (roro@[facility]). All rights reserved.      Do you have sleep apnea, excessive snoring or daytime drowsiness?: no    Reviewed and updated as needed this visit by clinical staff   Tobacco  Allergies  Meds            Reviewed and updated as needed this visit by Provider     Social History     Tobacco Use     Smoking status: Former     Packs/day: 0.50     Years: 10.00     Pack years: 5.00     Types: Cigarettes     Quit date: 1987     Years since quittin.5     Smokeless tobacco: Never     Tobacco comments:     Unsure how much per day   Substance Use Topics     Alcohol use: Yes     Comment: Occasionally         2023     8:42 AM   Alcohol Use   Prescreen: >3 drinks/day or >7 drinks/week? No          No data to display              Do you have a current opioid prescription? No  Do you use any other controlled substances or medications that are not prescribed by a provider? None    Current providers sharing in care for this patient include:   Patient Care Team:  Lorenzo Dunn MD as PCP - General  Mark Ochoa MD as Assigned Cancer Care Provider    The following health maintenance items are reviewed in Epic and correct as of today:  Health Maintenance   Topic Date Due     ANNUAL REVIEW OF HM ORDERS  Never done     COVID-19 Vaccine (6 - Pfizer series) 2023     MEDICARE ANNUAL WELLNESS VISIT  2023     INFLUENZA VACCINE (1) 2023     FALL RISK ASSESSMENT  2024     DEXA  2025     MAMMO SCREENING   "07/11/2025     LIPID  05/26/2027     ADVANCE CARE PLANNING  06/07/2027     DTAP/TDAP/TD IMMUNIZATION (4 - Td or Tdap) 07/27/2030     COLORECTAL CANCER SCREENING  09/02/2032     HEPATITIS C SCREENING  Completed     PHQ-2 (once per calendar year)  Completed     Pneumococcal Vaccine: 65+ Years  Completed     ZOSTER IMMUNIZATION  Completed     IPV IMMUNIZATION  Aged Out     MENINGITIS IMMUNIZATION  Aged Out     LUNG CANCER SCREENING  Discontinued     Review of Systems   Constitutional: Negative for chills and fever.   HENT: Negative for congestion, ear pain, hearing loss and sore throat.    Eyes: Negative for pain and visual disturbance.   Respiratory: Negative for cough and shortness of breath.    Cardiovascular: Positive for peripheral edema. Negative for chest pain and palpitations.   Gastrointestinal: Negative for abdominal pain, constipation, diarrhea, heartburn, hematochezia and nausea.   Breasts:  Negative for tenderness, breast mass and discharge.   Genitourinary: Negative for dysuria, frequency, genital sores, hematuria, pelvic pain, urgency, vaginal bleeding and vaginal discharge.   Musculoskeletal: Negative for arthralgias, joint swelling and myalgias.   Skin: Negative for rash.   Neurological: Positive for weakness. Negative for dizziness, headaches and paresthesias.   Psychiatric/Behavioral: Negative for mood changes. The patient is not nervous/anxious.      OBJECTIVE:     PHYSICAL EXAM:  General Appearance: In no acute distress  /71 (BP Location: Left arm, Patient Position: Sitting, Cuff Size: Adult Regular)   Pulse 79   Temp 97.2  F (36.2  C) (Tympanic)   Resp 19   Ht 1.657 m (5' 5.24\")   Wt 56.2 kg (124 lb)   LMP  (LMP Unknown)   SpO2 97%   Breastfeeding No   BMI 20.48 kg/m    EYES: Clear, fundi are unremarkable, discs flat   HEENT: nose and throat clear, ears normal   CHEST:  Mild kyphosis  NECK:  without cervical or axillary adenopathy  RESPIRATORY: Clear   CARDIOVASCULAR: S1, " S2  ABDOMEN: soft, flat, and non-tender, without mass, rebound, or guarding  EXTREMITIES: No joint swelling, no significant edema    ASSESSMENT / PLAN:     Raj was seen today for recheck medication, wellness visit, surgical followup, results, radiology visit, weight loss and imm/inj.    Diagnoses and all orders for this visit:    History of acute deep vein thrombosis (DVT) of axillary vein of right upper extremity   Her vascular physician has stopped the rivaroxiban, given her hematuria.  Unclear etiology of this thrombosis, but was on estrogen    History of colonic polyps  Colonoscopy 2021, adenomatous polyps, flexible sigmoidoscopy 10/22, neg    Hyperlipidemia LDL goal <100  Last LDL 96 untreated  -     Comprehensive metabolic panel; Future  -     Lipid panel reflex to direct LDL Fasting; Future    Personal history of ovarian cancer    Age-related osteoporosis without current pathological fracture  She will add calcium replacement to her vitamin D replacement. Has been on prolia in the past, will re-refer for prolia.      Weight loss  Need to monitor and evaluate.  Will get metabolic profile and thyroid function tests    R/o Iron deficiency  Recent mild anemia, likely multifactorial, can't exclude due to blood loss anemia, and iron deficiency.    -     Iron and iron binding capacity; Future  -     Ferritin; Future  -     CBC with platelets and differential; Future    Vitamin B12 deficiency (non anemic)  -     Vitamin B12; Future    R/o Hyperthyroidism  -     TSH with free T4 reflex; Future    Chronic anticoagulation  Has stopped the xarelto per vascular recommendations.     Nephrolithiasis  We discussed adequate hydration daily, she has urology follow up for stent removal, is s/p lithotripsy.     COUNSELING:  Reviewed preventive health counseling, as reflected in patient instructions       Regular exercise       Healthy diet/nutrition       Hearing screening       Bladder control       Osteoporosis  prevention/bone health       Colon cancer screening    She reports that she quit smoking about 36 years ago. Her smoking use included cigarettes. She has a 5.00 pack-year smoking history. She has never used smokeless tobacco.    Appropriate preventive services were discussed with this patient, including applicable screening as appropriate for cardiovascular disease, diabetes, osteopenia/osteoporosis, and glaucoma.  As appropriate for age/gender, discussed screening for colorectal cancer, prostate cancer, breast cancer, and cervical cancer. Checklist reviewing preventive services available has been given to the patient.    Reviewed patients plan of care and provided an AVS. The Basic Care Plan (routine screening as documented in Health Maintenance) for Raj meets the Care Plan requirement. This Care Plan has been established and reviewed with the Patient.    Lorenzo Dunn MD  [facility]    Identified Health Risks:    I have reviewed Opioid Use Disorder and Substance Use Disorder risk factors and made any needed referrals.    History of ovarian cancer    Osteoporosis    Mild anemia     Nephrolithiasis    Chronic fecal urgency incontinence        She is at risk for lack of exercise and has been provided with information to increase physical activity for the benefit of her well-being.

## 2023-07-24 ENCOUNTER — DOCUMENTATION ONLY (OUTPATIENT)
Dept: OTHER | Facility: CLINIC | Age: 73
End: 2023-07-24
Payer: COMMERCIAL

## 2023-07-25 ENCOUNTER — TRANSFERRED RECORDS (OUTPATIENT)
Dept: HEALTH INFORMATION MANAGEMENT | Facility: CLINIC | Age: 73
End: 2023-07-25
Payer: COMMERCIAL

## 2023-07-28 ENCOUNTER — TRANSFERRED RECORDS (OUTPATIENT)
Dept: HEALTH INFORMATION MANAGEMENT | Facility: CLINIC | Age: 73
End: 2023-07-28
Payer: COMMERCIAL

## 2023-08-01 ENCOUNTER — TRANSFERRED RECORDS (OUTPATIENT)
Dept: HEALTH INFORMATION MANAGEMENT | Facility: CLINIC | Age: 73
End: 2023-08-01
Payer: COMMERCIAL

## 2023-08-02 ENCOUNTER — TELEPHONE (OUTPATIENT)
Dept: VASCULAR SURGERY | Facility: CLINIC | Age: 73
End: 2023-08-02
Payer: COMMERCIAL

## 2023-08-02 NOTE — TELEPHONE ENCOUNTER
Caller: Raj    Provider: MD Adi Jimenez    Detailed reason for call: There are orders to ultrasound right arm, she asks that they also ultrasound the legs as well.  She states she has had 3 surgeries this year and has been on/off blood thinners.  She is worried about the status of her leg veins.  She states she is willing to pay OOP for the ultrasound on her legs if needed.  She would like to discuss the need for the ultrasound and reasons for denying her request if so.     Best phone number to contact: [phone]    Best time to contact: any    Ok to leave a detailed message: No    Ok to speak to authorized person if needed: No      (Noted to patient if reason is related to wound or incision, to please send a photo via email or [employer].)

## 2023-08-02 NOTE — TELEPHONE ENCOUNTER
"Spoke to Raj who said that she has requested an ultrasound on her legs previously but it has been denied and has not been ordered. She last saw Dr. Jimenez on 5/16 and is returning to clinic on 9/19. She is asymptomatic but she would just like \"peace of mind\" that everything is okay after all the surgeries she has had etc. Discussed with Raj that she should be assessed by one of her doctor's prior to ordering an ultrasound exam. Gave her the option of discussing with her PCP or waiting to discuss with Dr. Jimenez in September. She would like to wait and talk to Dr. Jimenez in September but is afraid he will say no to the screening ultrasound again.   "

## 2023-08-14 ENCOUNTER — TRANSFERRED RECORDS (OUTPATIENT)
Dept: HEALTH INFORMATION MANAGEMENT | Facility: CLINIC | Age: 73
End: 2023-08-14
Payer: COMMERCIAL

## 2023-08-18 ENCOUNTER — ALLIED HEALTH/NURSE VISIT (OUTPATIENT)
Dept: FAMILY MEDICINE | Facility: CLINIC | Age: 73
End: 2023-08-18
Payer: COMMERCIAL

## 2023-08-18 DIAGNOSIS — M81.0 AGE-RELATED OSTEOPOROSIS WITHOUT CURRENT PATHOLOGICAL FRACTURE: Primary | ICD-10-CM

## 2023-08-18 PROCEDURE — 96372 THER/PROPH/DIAG INJ SC/IM: CPT | Performed by: INTERNAL MEDICINE

## 2023-08-18 PROCEDURE — 99207 PR NO CHARGE NURSE ONLY: CPT

## 2023-08-18 NOTE — PROGRESS NOTES
Clinic Administered Medication Documentation      Prolia Documentation    Indication: Prolia  (denosumab) is a prescription medicine used to treat osteoporosis in patients who:   Are at high risk for fracture, meaning patients who have had a fracture related to osteoporosis, or who have multiple risk factors for fracture.  Cannot use another osteoporosis medicine or other osteoporosis medicines did not work well.  The timeline for early/late injections would be 4 weeks early and any time after the 6 month darryl. If a patient receives their injection late, then the subsequent injection would be 6 months from the date that they actually received the injection.    When was the last injection?  In   Was the last injection at least 6 months ago? Yes  Has the prior authorization been completed?  Yes  Is there an active order (written within the past 365 days, with administrations remaining, not ) in the chart?  Yes  Patient denies any dental work involving the bone (e.g. tooth extraction or dental implants) in the past 4 weeks?  Yes  Patient denies plans for any dental work involving the bone (e.g. tooth extraction or dental implants) in the next 4 weeks? Yes    The following steps were completed to comply with the REMS program for Prolia:  Reviewed information in the Medication Guide and Patient Counseling Chart, including the serious risks of Prolia  and the symptoms of each risk.  Advised patient to seek prompt medical attention if they have signs or symptoms of any of the serious risks.  Provided each patient a copy of the Medication Guide and Patient Brochure.    Prior to injection, verified patient identity using patient's name and date of birth. Medication was administered. Please see MAR and medication order for additional information. Patient instructed to remain in clinic for 15 minutes and report any adverse reaction to staff immediately.    Vial/Syringe: Syringe  Was this medication supplied by the  patient? No  Verified that the patient has refills remaining in their prescription.

## 2023-09-19 ENCOUNTER — ANCILLARY PROCEDURE (OUTPATIENT)
Dept: VASCULAR ULTRASOUND | Facility: CLINIC | Age: 73
End: 2023-09-19
Attending: RADIOLOGY
Payer: COMMERCIAL

## 2023-09-19 ENCOUNTER — OFFICE VISIT (OUTPATIENT)
Dept: VASCULAR SURGERY | Facility: CLINIC | Age: 73
End: 2023-09-19
Attending: RADIOLOGY
Payer: COMMERCIAL

## 2023-09-19 VITALS
SYSTOLIC BLOOD PRESSURE: 135 MMHG | TEMPERATURE: 97.7 F | OXYGEN SATURATION: 98 % | DIASTOLIC BLOOD PRESSURE: 74 MMHG | RESPIRATION RATE: 16 BRPM | HEART RATE: 81 BPM

## 2023-09-19 DIAGNOSIS — I82.A11 ACUTE DEEP VEIN THROMBOSIS (DVT) OF AXILLARY VEIN OF RIGHT UPPER EXTREMITY (H): ICD-10-CM

## 2023-09-19 DIAGNOSIS — I82.A11 ACUTE DEEP VEIN THROMBOSIS (DVT) OF AXILLARY VEIN OF RIGHT UPPER EXTREMITY (H): Primary | ICD-10-CM

## 2023-09-19 PROCEDURE — G0463 HOSPITAL OUTPT CLINIC VISIT: HCPCS | Mod: 25

## 2023-09-19 PROCEDURE — 93971 EXTREMITY STUDY: CPT | Mod: RT

## 2023-09-19 ASSESSMENT — PAIN SCALES - GENERAL: PAINLEVEL: NO PAIN (1)

## 2023-09-19 NOTE — PROGRESS NOTES
[facility]        Patient is here for a 1 year follow up  to discuss DVT/PE    Pt is currently taking no meds that would impact our treatment plan.    /74   Pulse 81   Temp 97.7  F (36.5  C)   Resp 16   LMP  (LMP Unknown)   SpO2 98%     The provider has been notified that the patient would like US of BLE.     Questions patient would like addressed today are: N/A.    Refills are needed: N/A    Has homecare services and agency name:  No

## 2023-09-20 NOTE — PROGRESS NOTES
VASCULAR AND INTERVENTIONAL OUTPATIENT CONSULT OR VISIT  PHYSICIAN: Adi Jimenez MD  ESTABLISHED PATIENT    LOCATION: [facility]    Raj Kim   Medical Record #:  [id number]  YOB: 1950  Age:  73 year old     Date of Service: 9/19/2023    PRIMARY CARE PROVIDER: Lorenzo Dunn    Reason for visit: Right upper extremity deep vein thrombosis     IMPRESSION: 73-year-old female with first lifetime episode of venous thromboembolism with extensive right upper extremity deep vein thrombosis diagnosed on ultrasound study dated 12/22/2021.  Her identified risk factors at that time include estrogen replacement therapy as well as receiving 3 Covid vaccinations.   Estrogen replacement therapy has been discontinued.  She remains asymptomatic despise the diffuse involvement of right upper extremity veins.  The patient was seen by hematology on 10/12/22 who did not feel that long-term anticoagulation would provide additional benefit, however, the patient continued with anticoagulation.  Previously, the patient developed hematuria ultimately necessitating discontinuation of anticoagulation.     RECOMMENDATION:    The patient underwent a repeat upper extremity ultrasound in the clinic today.  Images were reviewed in detail with the patient.  There has been no significant change in imaging since anticoagulation was discontinued/the prior study.  At this point, no further indication for any further imaging or clinic follow-up.  Return to clinic as needed in the future.     HPI:  Raj Kim is a 73 year old female who was seen today in follow up for right upper extremity deep vein thrombosis.  The patient reports she incidentally noted a palpable abnormality in her right upper extremity which prompted a right upper extremity venous duplex on 10/22/2021. Imaging at that time demonstrated significant thrombus involving the right brachial, cephalic, basilic, axillary, subclavian and internal jugular  veins.  She was subsequently started on anticoagulation with Xarelto. She previously reported blood streaks stools and was worked up by GI as well as hematuria which required work-up with [facility]. The patient remains asymptomatic in her right upper extremity. No shortness of breath, dyspnea or chest pain.      PHH:    Past Medical History:   Diagnosis Date    Acute deep vein thrombosis (DVT) of axillary vein (H) 12/22/2021    Fecal incontinence 07/27/2020    Hormone replacement therapy     Hx of radiation therapy     Lyme disease 07/24/2016    Osteopenia of multiple sites 05/02/2019    Osteoporosis     Ovarian cancer (H) 01/01/1980    Peripheral neuropathy, secondary to drugs or chemicals     Radiation colitis     Sensorineural hearing loss, bilateral 05/02/2019    Vitamin B 12 deficiency     Weight loss 7/19/2023        Past Surgical History:   Procedure Laterality Date    FINGER SURGERY Bilateral 01/01/2016    Dr. Tubbs [facility]    FOOT TENDON SURGERY Right     HYSTERECTOMY  01/01/1980    LAPAROSCOPIC CHOLECYSTECTOMY  2008    LAPAROTOMY EXPLORATORY  1980    ovary/omentum abnormalities    OOPHORECTOMY Bilateral 01/01/1980       ALLERGIES:  Acetaminophen, Celecoxib, Cephalosporins, Hydrocodone, Hydrocodone-acetaminophen, Nabumetone, and Penicillins    MEDS:    Current Outpatient Medications:     betamethasone dipropionate (DIPROLENE) 0.05 % ointment, Apply topically as needed, Disp: , Rfl:     calcium citrate-vitamin D3 (CITRACAL + D) 315-250 mg-unit per tablet, [CALCIUM CITRATE-VITAMIN D3 (CITRACAL + D) 315-250 MG-UNIT PER TABLET] Take 1 tablet by mouth daily., Disp: , Rfl:     cholecalciferol, vitamin D3, (VITAMIN D3) 2,000 unit capsule, [CHOLECALCIFEROL, VITAMIN D3, (VITAMIN D3) 2,000 UNIT CAPSULE] Take 5,000 Units by mouth daily.       , Disp: , Rfl:     cyanocobalamin, vitamin B-12, 1,000 mcg Subl, [CYANOCOBALAMIN, VITAMIN B-12, 1,000 MCG SUBL] Place 1,000 mcg under the tongue daily., Disp: ,  Rfl:     desonide (DESOWEN) 0.05 % cream, Apply topically as needed, Disp: , Rfl:     gabapentin (NEURONTIN) 100 MG capsule, Take 2 capsules by mouth every 24 hours, Disp: , Rfl:     Lactobacillus rhamnosus GG (CULTURELLE) 10-15 Billion cell capsule, [LACTOBACILLUS RHAMNOSUS GG (CULTURELLE) 10-15 BILLION CELL CAPSULE] Take 1 capsule by mouth daily., Disp: , Rfl:     Magnesium 400 MG TABS, Take by mouth daily, Disp: , Rfl:     multivitamin with minerals (THERA-M) 9 mg iron-400 mcg Tab tablet, [MULTIVITAMIN WITH MINERALS (THERA-M) 9 MG IRON-400 MCG TAB TABLET] Take 1 tablet by mouth daily., Disp: , Rfl:     psyllium with dextrose (METAMUCIL JAR) powder, 1 teaspoon daily, Disp: , Rfl:     TART CHERRY PO, Take 2 capsules by mouth daily, Disp: , Rfl:     valACYclovir (VALTREX) 500 MG tablet, Take 500 mg by mouth as needed, Disp: , Rfl:     Current Facility-Administered Medications:     denosumab (PROLIA) injection 60 mg, 60 mg, Subcutaneous, Q6 Months, Lorenzo Dunn MD, 60 mg at 08/18/23 1137    SOCIAL HABITS:    History   Smoking Status    Former    Packs/day: 0.50    Years: 10.00    Types: Cigarettes    Quit date: 1/1/1987   Smokeless Tobacco    Never     Social History    Substance and Sexual Activity      Alcohol use: Yes        Comment: Occasionally      History   Drug Use Unknown       FAMILY HISTORY:    Family History   Problem Relation Age of Onset    Arthritis Mother     Dementia Mother     Diabetes Father     Bladder Cancer Sister     Other Cancer Sister     No Known Problems Sister     Diabetes Type 2  Brother     Breast Cancer Maternal Grandmother     Colon Cancer Maternal Aunt     Breast Cancer Maternal Aunt     Diabetes Brother     Colon Cancer Other        ADVANCE CARE DIRECTIVES:    Advance care directives reviewed in the chart and no changes made.     PE:  /74   Pulse 81   Temp 97.7  F (36.5  C)   Resp 16   LMP  (LMP Unknown)   SpO2 98%   Wt Readings from Last 1 Encounters:   07/19/23 56.2  kg (124 lb)     There is no height or weight on file to calculate BMI.    EXAM:  GENERAL: This is a well-developed 73 year old female who appears her stated age  EYES: Grossly normal.  MOUTH: Buccal mucosa normal   MUSCULOSKELETAL: Grossly normal and both lower extremities are intact.  HEME/LYMPH: No lymphedema  NEUROLOGIC: Focally intact, Alert and oriented x 3.   PSYCH: appropriate affect  INTEGUMENT: No open lesions or ulcers    DIAGNOSTIC STUDIES:     Images:  US Upper Extremity Venous Duplex Right    Result Date: 9/19/2023  Table formatting from the original result was not included. RIGHT Venous Ultrasound (Date: 09/19/23) RIGHT Upper Extremities Indication:  History of extensive right internal jugular, subclavian, axillary, and brachial DVT, Right arm basilic SVT/patient on anticoagulants.  Right upper extremity deep vein thrombosis/swelling/pain  Previous: 3/29/22; 06/28/22; 09/27/22  Patient History Includes: DVT and Anticoagulants Right  Arm Location  IJV SUBC.V  MOIZ. V BRV. MID RAD.V  ULN. V. CEPH. V BAS. V Compressibility (FC,PC,NC) PC FC NC FC FC FC FC FC Spontaneous + + 0 +     Phasic  - + 0 +     Augmentation 0 + 0 +     Patent - + 0 +     Impression: Chronic, partially occlusive, thrombosis/scarring involving the right internal jugular vein.  Chronic occlusion of the axillary vein.  The remaining imaged veins are patent. Reference: Compressibility: FC= Fully compressible, PC= Partially compressible, NC= Non-compressible, NV= Not Visualized Venous Doppler: (+) = Present  (0) = Absent  (-) = Decreased/Unable to Evaluate, (NV) = Not Visualized       LABS:      Sodium   Date Value Ref Range Status   07/19/2023 139 136 - 145 mmol/L Final   05/26/2022 143 136 - 145 mmol/L Final   07/28/2021 140 136 - 145 mmol/L Final     Urea Nitrogen   Date Value Ref Range Status   07/19/2023 19.6 8.0 - 23.0 mg/dL Final   05/26/2022 19 8 - 22 mg/dL Final   07/28/2021 17 8 - 22 mg/dL Final   07/27/2020 14 8 - 22 mg/dL Final      Hemoglobin   Date Value Ref Range Status   07/19/2023 11.6 (L) 11.7 - 15.7 g/dL Final   05/04/2023 11.3 (L) 11.7 - 15.7 g/dL Final   02/28/2023 11.3 (L) 11.7 - 15.7 g/dL Final     Platelet Count   Date Value Ref Range Status   07/19/2023 338 150 - 450 10e3/uL Final   05/26/2022 325 150 - 450 10e3/uL Final   07/28/2021 270 150 - 450 10e3/uL Final       This was a in person visit in which 30 minutes of  total time was spent (either in face-to-face or non-face-to-face time).    Adi Jimenez MD, [facility]  VASCULAR AND INTERVENTIONAL PHYSICIAN  VASCULAR MEDICINE  INTERNAL MEDICINE  PAGER: [phone]  CALL SERVICE: [phone]

## 2023-10-11 ENCOUNTER — PATIENT OUTREACH (OUTPATIENT)
Dept: GASTROENTEROLOGY | Facility: CLINIC | Age: 73
End: 2023-10-11
Payer: COMMERCIAL

## 2023-10-16 ENCOUNTER — TRANSFERRED RECORDS (OUTPATIENT)
Dept: HEALTH INFORMATION MANAGEMENT | Facility: CLINIC | Age: 73
End: 2023-10-16
Payer: COMMERCIAL

## 2023-10-19 ENCOUNTER — OFFICE VISIT (OUTPATIENT)
Dept: INTERNAL MEDICINE | Facility: CLINIC | Age: 73
End: 2023-10-19
Payer: COMMERCIAL

## 2023-10-19 VITALS
RESPIRATION RATE: 17 BRPM | OXYGEN SATURATION: 95 % | DIASTOLIC BLOOD PRESSURE: 66 MMHG | SYSTOLIC BLOOD PRESSURE: 122 MMHG | HEIGHT: 65 IN | HEART RATE: 84 BPM | TEMPERATURE: 98.5 F | BODY MASS INDEX: 19.89 KG/M2 | WEIGHT: 119.4 LBS

## 2023-10-19 DIAGNOSIS — N13.30 HYDRONEPHROSIS, UNSPECIFIED HYDRONEPHROSIS TYPE: ICD-10-CM

## 2023-10-19 DIAGNOSIS — Z51.0 ENCOUNTER FOR RADIOTHERAPY: ICD-10-CM

## 2023-10-19 DIAGNOSIS — M81.0 AGE-RELATED OSTEOPOROSIS WITHOUT CURRENT PATHOLOGICAL FRACTURE: Primary | ICD-10-CM

## 2023-10-19 DIAGNOSIS — R63.4 WEIGHT LOSS: ICD-10-CM

## 2023-10-19 LAB
ALBUMIN SERPL BCG-MCNC: 4.4 G/DL (ref 3.5–5.2)
ALP SERPL-CCNC: 65 U/L (ref 35–104)
ALT SERPL W P-5'-P-CCNC: 26 U/L (ref 0–50)
ANION GAP SERPL CALCULATED.3IONS-SCNC: 9 MMOL/L (ref 7–15)
AST SERPL W P-5'-P-CCNC: 30 U/L (ref 0–45)
BILIRUB SERPL-MCNC: 0.2 MG/DL
BUN SERPL-MCNC: 22 MG/DL (ref 8–23)
CALCIUM SERPL-MCNC: 9.7 MG/DL (ref 8.8–10.2)
CHLORIDE SERPL-SCNC: 104 MMOL/L (ref 98–107)
CREAT SERPL-MCNC: 1.11 MG/DL (ref 0.51–0.95)
DEPRECATED HCO3 PLAS-SCNC: 23 MMOL/L (ref 22–29)
EGFRCR SERPLBLD CKD-EPI 2021: 53 ML/MIN/1.73M2
ERYTHROCYTE [DISTWIDTH] IN BLOOD BY AUTOMATED COUNT: 15.3 % (ref 10–15)
GLUCOSE SERPL-MCNC: 94 MG/DL (ref 70–99)
HCT VFR BLD AUTO: 35.7 % (ref 35–47)
HGB BLD-MCNC: 11.4 G/DL (ref 11.7–15.7)
MCH RBC QN AUTO: 27.9 PG (ref 26.5–33)
MCHC RBC AUTO-ENTMCNC: 31.9 G/DL (ref 31.5–36.5)
MCV RBC AUTO: 88 FL (ref 78–100)
PLATELET # BLD AUTO: 274 10E3/UL (ref 150–450)
POTASSIUM SERPL-SCNC: 5.1 MMOL/L (ref 3.4–5.3)
PROT SERPL-MCNC: 7.1 G/DL (ref 6.4–8.3)
RBC # BLD AUTO: 4.08 10E6/UL (ref 3.8–5.2)
SODIUM SERPL-SCNC: 136 MMOL/L (ref 135–145)
WBC # BLD AUTO: 8.3 10E3/UL (ref 4–11)

## 2023-10-19 PROCEDURE — 85027 COMPLETE CBC AUTOMATED: CPT | Performed by: INTERNAL MEDICINE

## 2023-10-19 PROCEDURE — 36415 COLL VENOUS BLD VENIPUNCTURE: CPT | Performed by: INTERNAL MEDICINE

## 2023-10-19 PROCEDURE — 99213 OFFICE O/P EST LOW 20 MIN: CPT | Performed by: INTERNAL MEDICINE

## 2023-10-19 PROCEDURE — 80053 COMPREHEN METABOLIC PANEL: CPT | Performed by: INTERNAL MEDICINE

## 2023-10-19 RX ORDER — RESPIRATORY SYNCYTIAL VIRUS VACCINE 120MCG/0.5
0.5 KIT INTRAMUSCULAR ONCE
Qty: 1 EACH | Refills: 0 | Status: CANCELLED | OUTPATIENT
Start: 2023-10-19 | End: 2023-10-19

## 2023-10-19 NOTE — PROGRESS NOTES
ASSESSMENT AND PLAN:    1. Age-related osteoporosis without current pathological fracture  Ongoing calcium and vitamin D therapy with prolia treatment.     2. Radiation Therapy  Remote for ovarian carcinoma, likely cause of mild hydronephrosis.     3. Weight loss  15 pounds in 1.5 years.  Unclear etiology.  No indication of chronic medical illness.  Scanning has been negative.  I suspect related to fears of her kidney function deteriorating and needing renal stents.  I reassured her that likely her mild hydronephrosis is mild, and there is no evidence of renal function impairment.  Thyroid function has recently been normal.  Will follow.   She doesn't need protein restriction and can drink boost daily if she wants.     4. History of nephrolithiasis, and hydronephrosis    Follow up in 2 months.     CHIEF COMPLAINT:  Follow up     HISTORY OF PRESENT ILLNESS:  Raj Kim is a 73 year old female with follow up. Worried about her kidney function and needing stents again. Recent US - mild hydronephrosis.  Renal function is stable and normal, but urology has suggested a low protein diet.  She denies fever, chills, or cough, or new bowel issues.  Appetite she feels is normal.       REVIEW OF SYSTEMS:   See HPI, all other systems on review are negative.    Past Medical History:   Diagnosis Date    Acute deep vein thrombosis (DVT) of axillary vein (H) 12/22/2021    Fecal incontinence 07/27/2020    Hormone replacement therapy     Hx of radiation therapy     Lyme disease 07/24/2016    Osteopenia of multiple sites 05/02/2019    Osteoporosis     Ovarian cancer (H) 01/01/1980    Peripheral neuropathy, secondary to drugs or chemicals     Radiation colitis     Sensorineural hearing loss, bilateral 05/02/2019    Vitamin B 12 deficiency     Weight loss 7/19/2023     History   Smoking Status    Former    Packs/day: 0.50    Years: 10.00    Types: Cigarettes    Quit date: 1/1/1987   Smokeless Tobacco    Never     Family History  "  Problem Relation Age of Onset    Arthritis Mother     Dementia Mother     Diabetes Father     Bladder Cancer Sister     Other Cancer Sister     No Known Problems Sister     Diabetes Type 2  Brother     Breast Cancer Maternal Grandmother     Colon Cancer Maternal Aunt     Breast Cancer Maternal Aunt     Diabetes Brother     Colon Cancer Other      Past Surgical History:   Procedure Laterality Date    FINGER SURGERY Bilateral 01/01/2016    [facility]    FOOT TENDON SURGERY Right     HYSTERECTOMY  01/01/1980    LAPAROSCOPIC CHOLECYSTECTOMY  2008    LAPAROTOMY EXPLORATORY  1980    ovary/omentum abnormalities    OOPHORECTOMY Bilateral 01/01/1980     VITALS:  Vitals:    10/19/23 1322   BP: 122/66   BP Location: Left arm   Patient Position: Sitting   Cuff Size: Adult Regular   Pulse: 84   Resp: 17   Temp: 98.5  F (36.9  C)   TempSrc: Tympanic   SpO2: 95%   Weight: 54.2 kg (119 lb 6.4 oz)   Height: 1.657 m (5' 5.24\")     Wt Readings from Last 3 Encounters:   10/19/23 54.2 kg (119 lb 6.4 oz)   07/19/23 56.2 kg (124 lb)   06/07/23 56.5 kg (124 lb 9.6 oz)     PHYSICAL EXAM:  Constitutional:  In NAD, alert and oriented  Neck: no cervical or axillary adenopathy  Cardiac:  S1 S2   Lungs: Clear    DECISION TO OBTAIN OLD RECORDS AND/OR OBTAIN HISTORY FROM SOMEONE OTHER THAN PATIENT, AND/OR ACCESSING CARE EVERYWHERE):  1  0     REVIEW AND SUMMARIZATION OF OLD RECORDS, AND/OR OBTAINING HISTORY FROM SOMEONE OTHER THAN PATIENT, AND/OR DISCUSSION OF CASE WITH ANOTHER HEALTH CARE PROVIDER:  2 reviewed recent health notes    REVIEW AND/OR ORDER OF OF CLINICAL LAB TESTS: 1  ordered.    REVIEW AND/OR ORDER OF RADIOLOGY TESTS: 1 reviewed US abdomen.    REVIEW AND/OR ORDER OF MEDICAL TESTS (EKG/ECHO/COLONOSCOPY/EGD): 1  0    INDEPENDENT  VISUALIZATION OF IMAGE, TRACING, OR SPECIMEN ITSELF (2 EACH):  0     TOTAL: 4    Current Outpatient Medications   Medication Sig Dispense Refill    betamethasone dipropionate (DIPROLENE) 0.05 " % ointment Apply topically as needed      calcium citrate-vitamin D3 (CITRACAL + D) 315-250 mg-unit per tablet [CALCIUM CITRATE-VITAMIN D3 (CITRACAL + D) 315-250 MG-UNIT PER TABLET] Take 1 tablet by mouth daily.      cholecalciferol, vitamin D3, (VITAMIN D3) 2,000 unit capsule [CHOLECALCIFEROL, VITAMIN D3, (VITAMIN D3) 2,000 UNIT CAPSULE] Take 5,000 Units by mouth daily.             cyanocobalamin, vitamin B-12, 1,000 mcg Subl [CYANOCOBALAMIN, VITAMIN B-12, 1,000 MCG SUBL] Place 1,000 mcg under the tongue daily.      desonide (DESOWEN) 0.05 % cream Apply topically as needed      gabapentin (NEURONTIN) 100 MG capsule Take 2 capsules by mouth every 24 hours      Lactobacillus rhamnosus GG (CULTURELLE) 10-15 Billion cell capsule [LACTOBACILLUS RHAMNOSUS GG (CULTURELLE) 10-15 BILLION CELL CAPSULE] Take 1 capsule by mouth daily.      Magnesium 400 MG TABS Take by mouth daily      multivitamin with minerals (THERA-M) 9 mg iron-400 mcg Tab tablet [MULTIVITAMIN WITH MINERALS (THERA-M) 9 MG IRON-400 MCG TAB TABLET] Take 1 tablet by mouth daily.      psyllium with dextrose (METAMUCIL JAR) powder 1 teaspoon daily      TART CHERRY PO Take 2 capsules by mouth daily      valACYclovir (VALTREX) 500 MG tablet Take 500 mg by mouth as needed       Lorenzo Dunn MD  Internal Medicine  [facility]

## 2023-10-19 NOTE — LETTER
October 23, 2023      Raj Kim  [address] [place] [address]        Dear ,    We are writing to inform you of your test results.    Your tests are good. Your kidney function is normal. 1.11 is just fine.  You have a very mild anemia, that has not changed over the years, and is normal for you.      Resulted Orders   Comprehensive metabolic panel   Result Value Ref Range    Sodium 136 135 - 145 mmol/L      Comment:      Reference intervals for this test were updated on 09/26/2023 to more accurately reflect our healthy population. There may be differences in the flagging of prior results with similar values performed with this method. Interpretation of those prior results can be made in the context of the updated reference intervals.     Potassium 5.1 3.4 - 5.3 mmol/L    Carbon Dioxide (CO2) 23 22 - 29 mmol/L    Anion Gap 9 7 - 15 mmol/L    Urea Nitrogen 22.0 8.0 - 23.0 mg/dL    Creatinine 1.11 (H) 0.51 - 0.95 mg/dL    GFR Estimate 53 (L) >60 mL/min/1.73m2    Calcium 9.7 8.8 - 10.2 mg/dL    Chloride 104 98 - 107 mmol/L    Glucose 94 70 - 99 mg/dL    Alkaline Phosphatase 65 35 - 104 U/L    AST 30 0 - 45 U/L      Comment:      Reference intervals for this test were updated on 6/12/2023 to more accurately reflect our healthy population. There may be differences in the flagging of prior results with similar values performed with this method. Interpretation of those prior results can be made in the context of the updated reference intervals.    ALT 26 0 - 50 U/L      Comment:      Reference intervals for this test were updated on 6/12/2023 to more accurately reflect our healthy population. There may be differences in the flagging of prior results with similar values performed with this method. Interpretation of those prior results can be made in the context of the updated reference intervals.      Protein Total 7.1 6.4 - 8.3 g/dL    Albumin 4.4 3.5 - 5.2 g/dL    Bilirubin Total 0.2 <=1.2 mg/dL   CBC  with platelets   Result Value Ref Range    WBC Count 8.3 4.0 - 11.0 10e3/uL    RBC Count 4.08 3.80 - 5.20 10e6/uL    Hemoglobin 11.4 (L) 11.7 - 15.7 g/dL    Hematocrit 35.7 35.0 - 47.0 %    MCV 88 78 - 100 fL    MCH 27.9 26.5 - 33.0 pg    MCHC 31.9 31.5 - 36.5 g/dL    RDW 15.3 (H) 10.0 - 15.0 %    Platelet Count 274 150 - 450 10e3/uL       If you have any questions or concerns, please call the clinic at the number listed above.       Sincerely,      Lorenzo Dunn MD

## 2023-10-30 ENCOUNTER — TRANSFERRED RECORDS (OUTPATIENT)
Dept: HEALTH INFORMATION MANAGEMENT | Facility: CLINIC | Age: 73
End: 2023-10-30
Payer: COMMERCIAL

## 2023-12-05 ENCOUNTER — TRANSFERRED RECORDS (OUTPATIENT)
Dept: HEALTH INFORMATION MANAGEMENT | Facility: CLINIC | Age: 73
End: 2023-12-05
Payer: COMMERCIAL

## 2023-12-06 ENCOUNTER — TRANSFERRED RECORDS (OUTPATIENT)
Dept: HEALTH INFORMATION MANAGEMENT | Facility: CLINIC | Age: 73
End: 2023-12-06
Payer: COMMERCIAL

## 2023-12-18 ENCOUNTER — TELEPHONE (OUTPATIENT)
Dept: NURSING | Facility: CLINIC | Age: 73
End: 2023-12-18
Payer: COMMERCIAL

## 2023-12-18 NOTE — TELEPHONE ENCOUNTER
Pt just spoke with triage regarding COVID exposure.     Pt has an appointment coming up tomorrow and is wondering if she tests positive would she need to reschedule. Writer advised that yes pt would need to reschedule that apt if she tests positive for COVID tonight. It sounds like pt plans to test tonight and if positive she will reach out to the clinic first thing in the am to cancel.           Lucinda Mora RN on 12/18/2023 at 5:14 PM

## 2023-12-18 NOTE — TELEPHONE ENCOUNTER
Pt calling with concerns about;    Has office visit scheduled for 12/1923  Just learned that a friend has tested covid positive today  Pt had direct exposure but has no symptoms.    Advised to test 5 - 7 days from exposure date.    Pt verbalized understanding and agrees with this plan.    Toya Pedersen RN, Nurse Advisor 5:06 PM 12/18/2023

## 2023-12-19 ENCOUNTER — OFFICE VISIT (OUTPATIENT)
Dept: INTERNAL MEDICINE | Facility: CLINIC | Age: 73
End: 2023-12-19
Payer: COMMERCIAL

## 2023-12-19 VITALS
SYSTOLIC BLOOD PRESSURE: 133 MMHG | OXYGEN SATURATION: 99 % | TEMPERATURE: 97 F | DIASTOLIC BLOOD PRESSURE: 76 MMHG | BODY MASS INDEX: 20.09 KG/M2 | HEIGHT: 65 IN | HEART RATE: 76 BPM | RESPIRATION RATE: 16 BRPM | WEIGHT: 120.6 LBS

## 2023-12-19 DIAGNOSIS — M81.0 AGE-RELATED OSTEOPOROSIS WITHOUT CURRENT PATHOLOGICAL FRACTURE: ICD-10-CM

## 2023-12-19 DIAGNOSIS — Z86.0100 HISTORY OF COLONIC POLYPS: ICD-10-CM

## 2023-12-19 DIAGNOSIS — G60.9 HEREDITARY AND IDIOPATHIC PERIPHERAL NEUROPATHY: ICD-10-CM

## 2023-12-19 DIAGNOSIS — N13.2 HYDRONEPHROSIS WITH URINARY OBSTRUCTION DUE TO URETERAL CALCULUS: Primary | ICD-10-CM

## 2023-12-19 PROBLEM — Z11.59 NEED FOR HEPATITIS C SCREENING TEST: Status: RESOLVED | Noted: 2021-12-24 | Resolved: 2023-12-19

## 2023-12-19 PROCEDURE — 99214 OFFICE O/P EST MOD 30 MIN: CPT | Performed by: INTERNAL MEDICINE

## 2023-12-19 RX ORDER — INFLUENZA A VIRUS A/MICHIGAN/45/2015 X-275 (H1N1) ANTIGEN (FORMALDEHYDE INACTIVATED), INFLUENZA A VIRUS A/SINGAPORE/INFIMH-16-0019/2016 IVR-186 (H3N2) ANTIGEN (FORMALDEHYDE INACTIVATED), INFLUENZA B VIRUS B/PHUKET/3073/2013 ANTIGEN (FORMALDEHYDE INACTIVATED), AND INFLUENZA B VIRUS B/MARYLAND/15/2016 BX-69A ANTIGEN (FORMALDEHYDE INACTIVATED) 60; 60; 60; 60 UG/.7ML; UG/.7ML; UG/.7ML; UG/.7ML
INJECTION, SUSPENSION INTRAMUSCULAR
COMMUNITY
Start: 2023-09-19 | End: 2023-12-19

## 2023-12-19 RX ORDER — RESPIRATORY SYNCYTIAL VIRUS VACCINE 120MCG/0.5
0.5 KIT INTRAMUSCULAR ONCE
Qty: 1 EACH | Refills: 0 | Status: CANCELLED | OUTPATIENT
Start: 2023-12-19 | End: 2023-12-19

## 2023-12-19 RX ORDER — CALCIUM CARBONATE 500 MG/1
2 TABLET, CHEWABLE ORAL 2 TIMES DAILY
Start: 2023-12-19

## 2023-12-19 NOTE — PROGRESS NOTES
ASSESSMENT AND PLAN:    1. Hydronephrosis with urinary obstruction due to ureteral calculus  She plans to have ureteroscopy 3/2024 at [facility], is asymptomatic.     2. Peripheral Neuropathy  Ongoing, stable.      3. History of colonic polyps  Due for colonoscopy 8/2026    4. Age-related osteoporosis without current pathological fracture  Taking 1000 mg calcium (as TUMS), with vitamin D, and has had first dose of prolia.    5. Pulmonary nodules - 2-3mm on CT  Very low risk. Reassured. She prefers follow up CT 12 months - next summer..     Follow up in 6 months.     CHIEF COMPLAINT:  Follow up hydronephrosis and weight loss    HISTORY OF PRESENT ILLNESS:  Raj Kim is a 73 year old female here in follow up.  She is doing OK.  Weight is stable, no new issues except mild nocturia.  No dyspnea or cough. No flank pain, has decided on ureterscopy for stone and mild hydronephrosis.  Bowels are unchanged. No acute illness.  Remains anxious chronically but mildly.  Wants lung CT follow up one year due to the small nodules.     REVIEW OF SYSTEMS:   See HPI, all other systems on review are negative.    Past Medical History:   Diagnosis Date    Acute deep vein thrombosis (DVT) of axillary vein (H) 12/22/2021    Chronic anemia     normochromic, normocytic    Fecal incontinence 07/27/2020    Hormone replacement therapy     Hx of radiation therapy     Hydronephrosis with urinary obstruction due to ureteral calculus     Lyme disease 07/24/2016    Osteopenia of multiple sites 05/02/2019    Osteoporosis     Ovarian cancer (H) 01/01/1980    Peripheral neuropathy, secondary to drugs or chemicals     Radiation colitis     Sensorineural hearing loss, bilateral 05/02/2019    Vitamin B 12 deficiency     Weight loss 07/19/2023     History   Smoking Status    Former    Packs/day: 0.50    Years: 10.00    Types: Cigarettes    Quit date: 1/1/1987   Smokeless Tobacco    Never     Family History   Problem Relation Age of Onset    Arthritis Mother  "    Dementia Mother     Diabetes Father     Bladder Cancer Sister     Other Cancer Sister     No Known Problems Sister     Diabetes Type 2  Brother     Breast Cancer Maternal Grandmother     Colon Cancer Maternal Aunt     Breast Cancer Maternal Aunt     Diabetes Brother     Colon Cancer Other      Past Surgical History:   Procedure Laterality Date    FINGER SURGERY Bilateral 01/01/2016    Dr. Tub[facility]    FOOT TENDON SURGERY Right     HYSTERECTOMY  01/01/1980    LAPAROSCOPIC CHOLECYSTECTOMY  2008    LAPAROTOMY EXPLORATORY  1980    ovary/omentum abnormalities    OOPHORECTOMY Bilateral 01/01/1980     VITALS:  Vitals:    12/19/23 1359 12/19/23 1404   BP: (!) 149/80 133/76   BP Location: Left arm    Patient Position: Sitting    Cuff Size: Adult Regular    Pulse: 76    Resp: 16    Temp: 97  F (36.1  C)    TempSrc: Tympanic    SpO2: 99%    Weight: 54.7 kg (120 lb 9.6 oz)    Height: 1.651 m (5' 5\")      Wt Readings from Last 3 Encounters:   12/19/23 54.7 kg (120 lb 9.6 oz)   10/19/23 54.2 kg (119 lb 6.4 oz)   07/19/23 56.2 kg (124 lb)     PHYSICAL EXAM:  Constitutional:  In NAD, alert and oriented  Neck: no cervical or axillary adenopathy  Cardiac:  S1 S2   Lungs: Clear   Abdomen:   Soft, flat and non-tender    DECISION TO OBTAIN OLD RECORDS AND/OR OBTAIN HISTORY FROM SOMEONE OTHER THAN PATIENT, AND/OR ACCESSING CARE EVERYWHERE):  1  0     REVIEW AND SUMMARIZATION OF OLD RECORDS, AND/OR OBTAINING HISTORY FROM SOMEONE OTHER THAN PATIENT, AND/OR DISCUSSION OF CASE WITH ANOTHER HEALTH CARE PROVIDER:  2 reviewed past health notes.     REVIEW AND/OR ORDER OF OF CLINICAL LAB TESTS: 1  reviewed.    REVIEW AND/OR ORDER OF RADIOLOGY TESTS: 1 reviewed her CT.    REVIEW AND/OR ORDER OF MEDICAL TESTS (EKG/ECHO/COLONOSCOPY/EGD): 1  reviewed her most recent colonoscopy.     INDEPENDENT  VISUALIZATION OF IMAGE, TRACING, OR SPECIMEN ITSELF (2 EACH):  0     TOTAL: 4    Current Outpatient Medications   Medication Sig Dispense " Refill    betamethasone dipropionate (DIPROLENE) 0.05 % ointment Apply topically as needed      calcium citrate-vitamin D3 (CITRACAL + D) 315-250 mg-unit per tablet [CALCIUM CITRATE-VITAMIN D3 (CITRACAL + D) 315-250 MG-UNIT PER TABLET] Take 1 tablet by mouth daily.      cholecalciferol, vitamin D3, (VITAMIN D3) 2,000 unit capsule [CHOLECALCIFEROL, VITAMIN D3, (VITAMIN D3) 2,000 UNIT CAPSULE] Take 5,000 Units by mouth daily.             cyanocobalamin, vitamin B-12, 1,000 mcg Subl [CYANOCOBALAMIN, VITAMIN B-12, 1,000 MCG SUBL] Place 1,000 mcg under the tongue daily.      desonide (DESOWEN) 0.05 % cream Apply topically as needed      FLUZONE HIGH-DOSE QUADRIVALENT 0.7 ML MARISA injection       gabapentin (NEURONTIN) 100 MG capsule Take 2 capsules by mouth every 24 hours      Lactobacillus rhamnosus GG (CULTURELLE) 10-15 Billion cell capsule [LACTOBACILLUS RHAMNOSUS GG (CULTURELLE) 10-15 BILLION CELL CAPSULE] Take 1 capsule by mouth daily.      Magnesium 400 MG TABS Take by mouth daily      multivitamin with minerals (THERA-M) 9 mg iron-400 mcg Tab tablet [MULTIVITAMIN WITH MINERALS (THERA-M) 9 MG IRON-400 MCG TAB TABLET] Take 1 tablet by mouth daily.      psyllium with dextrose (METAMUCIL JAR) powder 1 teaspoon daily      TART CHERRY PO Take 2 capsules by mouth daily      valACYclovir (VALTREX) 500 MG tablet Take 500 mg by mouth as needed       Lorenzo Dunn MD  Internal Medicine  [facility]

## 2023-12-31 ENCOUNTER — MYC MEDICAL ADVICE (OUTPATIENT)
Dept: INTERNAL MEDICINE | Facility: CLINIC | Age: 73
End: 2023-12-31
Payer: COMMERCIAL

## 2024-02-15 ENCOUNTER — TELEPHONE (OUTPATIENT)
Dept: INTERNAL MEDICINE | Facility: CLINIC | Age: 74
End: 2024-02-15
Payer: COMMERCIAL

## 2024-02-15 DIAGNOSIS — M81.0 AGE-RELATED OSTEOPOROSIS WITHOUT CURRENT PATHOLOGICAL FRACTURE: Primary | ICD-10-CM

## 2024-02-15 DIAGNOSIS — Z92.29 PERSONAL HISTORY OF OTHER DRUG THERAPY: ICD-10-CM

## 2024-02-15 NOTE — TELEPHONE ENCOUNTER
Patient's last prolia was 2023, pt has an upcoming prolia injection appointment on 3/8/2024 at [place].     If this patient is to continue with Prolia injection therapy a new, active prolia order is needed for the upcoming administration.     A Prolia order has been pended with the previously administered order's associated diagnoses; signing provider to review diagnoses to ensure these still apply to patient.    Diagnoses from order used during 2023 administration:   Age-related osteoporosis without current pathological fracture [M81.0]      Personal history of other drug therapy [Z92.29]          Have previous orders been discontinued due to being ? Yes    Patient's most recent labs within the past year (Calcium, Albumin, Creatinine):      Latest Reference Range & Units 10/19/23 14:20   Creatinine 0.51 - 0.95 mg/dL 1.11 (H)   Calcium 8.8 - 10.2 mg/dL 9.7   (H): Data is abnormally high    Labs were completed within the past 6 months , labs were completed within last six months, no labs are pended for provider review. .     Prolia provider information with link to prescribing information: [email or web address]  Note: Per Prolia ordering smartset, an albumin level is recommended if Calcium level is < 8.5.     Provider action needed: please review/sign prolia order, review associated diagnoses, review/sign recent labs (if needed); please remove lab orders if not needed.

## 2024-03-08 ENCOUNTER — ALLIED HEALTH/NURSE VISIT (OUTPATIENT)
Dept: FAMILY MEDICINE | Facility: CLINIC | Age: 74
End: 2024-03-08
Payer: COMMERCIAL

## 2024-03-08 ENCOUNTER — TRANSFERRED RECORDS (OUTPATIENT)
Dept: HEALTH INFORMATION MANAGEMENT | Facility: CLINIC | Age: 74
End: 2024-03-08

## 2024-03-08 DIAGNOSIS — M81.0 AGE-RELATED OSTEOPOROSIS WITHOUT CURRENT PATHOLOGICAL FRACTURE: Primary | ICD-10-CM

## 2024-03-08 DIAGNOSIS — Z92.29 PERSONAL HISTORY OF OTHER DRUG THERAPY: ICD-10-CM

## 2024-03-08 PROCEDURE — 96372 THER/PROPH/DIAG INJ SC/IM: CPT | Performed by: INTERNAL MEDICINE

## 2024-03-08 PROCEDURE — 99207 PR NO CHARGE NURSE ONLY: CPT

## 2024-03-08 NOTE — PROGRESS NOTES
Clinic Administered Medication Documentation      Prolia Documentation    Indication: Prolia  (denosumab) is a prescription medicine used to treat osteoporosis in patients who:   Are at high risk for fracture, meaning patients who have had a fracture related to osteoporosis, or who have multiple risk factors for fracture.  Cannot use another osteoporosis medicine or other osteoporosis medicines did not work well.  The timeline for early/late injections would be 4 weeks early and any time after the 6 month darryl. If a patient receives their injection late, then the subsequent injection would be 6 months from the date that they actually received the injection.    When was the last injection?  23  Was the last injection at least 6 months ago? Yes  Has the prior authorization been completed?  Yes  Is there an active order (written within the past 365 days, with administrations remaining, not ) in the chart?  Yes  Patient denies any dental work involving the bone (e.g. tooth extraction or dental implants) in the past 4 weeks?  Yes  Patient denies plans for any dental work involving the bone (e.g. tooth extraction or dental implants) in the next 4 weeks? Yes    The following steps were completed to comply with the REMS program for Prolia:  Reviewed information in the Medication Guide and Patient Counseling Chart, including the serious risks of Prolia  and the symptoms of each risk.  Advised patient to seek prompt medical attention if they have signs or symptoms of any of the serious risks.  Provided each patient a copy of the Medication Guide and Patient Brochure.    Prior to injection, verified patient identity using patient's name and date of birth. Medication was administered. Please see MAR and medication order for additional information. Patient instructed to remain in clinic for 15 minutes and report any adverse reaction to staff immediately.    Vial/Syringe: Syringe  Was this medication supplied by the  patient? No  Verified that the patient has refills remaining in their prescription.

## 2024-03-29 DIAGNOSIS — N13.30 HYDRONEPHROSIS: Primary | ICD-10-CM

## 2024-04-01 ENCOUNTER — TRANSFERRED RECORDS (OUTPATIENT)
Dept: HEALTH INFORMATION MANAGEMENT | Facility: CLINIC | Age: 74
End: 2024-04-01
Payer: COMMERCIAL

## 2024-04-02 ENCOUNTER — VIRTUAL VISIT (OUTPATIENT)
Dept: INTERNAL MEDICINE | Facility: CLINIC | Age: 74
End: 2024-04-02
Payer: COMMERCIAL

## 2024-04-02 DIAGNOSIS — N13.1 HYDRONEPHROSIS CONCURRENT WITH AND DUE TO URETERAL STRICTURE: Primary | ICD-10-CM

## 2024-04-02 PROBLEM — N20.0 NEPHROLITHIASIS: Status: ACTIVE | Noted: 2024-03-19

## 2024-04-02 PROBLEM — D64.9 CHRONIC ANEMIA: Status: ACTIVE | Noted: 2023-10-23

## 2024-04-02 PROBLEM — Z86.718 HISTORY OF DEEP VENOUS THROMBOSIS: Status: ACTIVE | Noted: 2021-12-22

## 2024-04-02 PROBLEM — M81.0 OSTEOPOROSIS: Status: RESOLVED | Noted: 2023-06-27 | Resolved: 2024-04-02

## 2024-04-02 PROBLEM — M81.0 OSTEOPOROSIS: Status: ACTIVE | Noted: 2023-06-27

## 2024-04-02 PROCEDURE — 99214 OFFICE O/P EST MOD 30 MIN: CPT | Mod: 95 | Performed by: INTERNAL MEDICINE

## 2024-04-02 NOTE — PROGRESS NOTES
Raj is a 73 year old who is being evaluated via a billable video visit.    How would you like to obtain your AVS? MyChart  If the video visit is dropped, the invitation should be resent by: Text to cell phone: [phone]  Will anyone else be joining your video visit? No    Assessment & Plan     Hydronephrosis concurrent with and due to ureteral stricture  She now has nephrostomy tube, and the [facility] is contemplating and monitoring for the best option, a ureteral stent was not possible.      Weight loss  Recent weight 118.  Was 120, 119, 124 last year.     Osteoporosis  Ongoing prolia treatment and with calcium and vitamin D.     Anemia  Mild without microcytosis.  Possibly related to chronic disease.  She is now taking iron with vitamin C.      Follow up in 6 months and as needed.     Subjective   Raj is a 73 year old, presenting for the following health issues:  office visit  and Recheck Medication (Pt is here for office visit )  Doing OK with nephrostomy tube in place. Doing well overall. Getting care with [facility]. She may lose function of the one kidney, but the other        4/2/2024     7:08 AM   Additional Questions   Roomed by jennifer         4/2/2024     7:08 AM   Patient Reported Additional Medications   Patient reports taking the following new medications iron with vitamin c     History of Present Illness       Reason for visit:  Weight loss   She is taking medications regularly.    Objective    She is alert and oriented in NAD    Video-Visit Details    Type of service:  Video Visit   Video End Time:10:01 AM   end 10:22 PM  Originating Location (pt. Location): Home    Distant Location (provider location):  On-site  Platform used for Video Visit: Bee  Signed Electronically by: Lorenzo Dunn MD

## 2024-04-03 ENCOUNTER — LAB (OUTPATIENT)
Dept: LAB | Facility: CLINIC | Age: 74
End: 2024-04-03
Payer: COMMERCIAL

## 2024-04-03 DIAGNOSIS — N13.30 HYDRONEPHROSIS: ICD-10-CM

## 2024-04-03 LAB
ALBUMIN UR-MCNC: NEGATIVE MG/DL
APPEARANCE UR: CLEAR
BACTERIA #/AREA URNS HPF: NORMAL /HPF
BILIRUB UR QL STRIP: NEGATIVE
COLOR UR AUTO: YELLOW
GLUCOSE UR STRIP-MCNC: NEGATIVE MG/DL
HGB UR QL STRIP: NEGATIVE
KETONES UR STRIP-MCNC: NEGATIVE MG/DL
LEUKOCYTE ESTERASE UR QL STRIP: NEGATIVE
NITRATE UR QL: NEGATIVE
PH UR STRIP: 6 [PH] (ref 5–8)
RBC #/AREA URNS AUTO: NORMAL /HPF
SP GR UR STRIP: <=1.005 (ref 1–1.03)
UROBILINOGEN UR STRIP-ACNC: 0.2 E.U./DL
WBC #/AREA URNS AUTO: NORMAL /HPF

## 2024-04-03 PROCEDURE — 81001 URINALYSIS AUTO W/SCOPE: CPT

## 2024-04-03 PROCEDURE — 87086 URINE CULTURE/COLONY COUNT: CPT

## 2024-04-04 LAB — BACTERIA UR CULT: NORMAL

## 2024-04-22 ENCOUNTER — TELEPHONE (OUTPATIENT)
Dept: VASCULAR SURGERY | Facility: CLINIC | Age: 74
End: 2024-04-22
Payer: COMMERCIAL

## 2024-04-22 DIAGNOSIS — I82.A11 ACUTE DEEP VEIN THROMBOSIS (DVT) OF AXILLARY VEIN OF RIGHT UPPER EXTREMITY (H): Primary | ICD-10-CM

## 2024-04-22 NOTE — TELEPHONE ENCOUNTER
LMTCB,     Pt was seen by Dr. Jimenez 9/19/23 for  Right upper extremity deep vein thrombosis. Pt was to follow-up as needed.    Wondering what time of arm pain pt is having to determine if imaging is needed prior to appointment on 4/30/24.

## 2024-04-22 NOTE — TELEPHONE ENCOUNTER
Dr. Jimenez would like to have pt get US done before follow-up. She will be contacted to schedule.     She has a hx of DVT in the upper arm, I would get an arm venous DVT US.

## 2024-04-24 ENCOUNTER — ANCILLARY PROCEDURE (OUTPATIENT)
Dept: VASCULAR ULTRASOUND | Facility: CLINIC | Age: 74
End: 2024-04-24
Attending: RADIOLOGY
Payer: COMMERCIAL

## 2024-04-24 DIAGNOSIS — I82.A11 ACUTE DEEP VEIN THROMBOSIS (DVT) OF AXILLARY VEIN OF RIGHT UPPER EXTREMITY (H): ICD-10-CM

## 2024-04-24 PROCEDURE — 93971 EXTREMITY STUDY: CPT | Mod: 26 | Performed by: SURGERY

## 2024-04-24 PROCEDURE — 93971 EXTREMITY STUDY: CPT | Mod: RT

## 2024-04-25 NOTE — TELEPHONE ENCOUNTER
Pt updated that Dr. Jimenez reviewed her US done yesterday and it was unchanged from the previous. No need to see Dr. Jimenez. She can follow-up with her primary due to her symptoms.

## 2024-05-08 NOTE — TELEPHONE ENCOUNTER
Called and spoke with pt. She states that if something changes/get worse she'll see her primary care provider. She voiced that she finds it hard to believe that her US is unchanged from the previous US and looks forward to discussing with Dr. Blanc.     Frida Jaimes RN, CWOCN  [phone]

## 2024-05-08 NOTE — TELEPHONE ENCOUNTER
Caller: Patient    Provider: MD Liat Blanc    Detailed reason for call: Patient is quite concerned with the pain in her right upper arm, and she is wondering if she needs to be sooner than 6/12. She is currently on the wait list for Dr. Blanc.     Best phone number to contact: [phone]    Best time to contact: Any time    Ok to leave a detailed message: Yes    Ok to speak to authorized person if needed: No      (Noted to patient if reason is related to wound or incision, to please send a photo via email or [place].)

## 2024-05-09 ENCOUNTER — LAB (OUTPATIENT)
Dept: LAB | Facility: CLINIC | Age: 74
End: 2024-05-09
Payer: COMMERCIAL

## 2024-05-09 ENCOUNTER — TRANSFERRED RECORDS (OUTPATIENT)
Dept: HEALTH INFORMATION MANAGEMENT | Facility: CLINIC | Age: 74
End: 2024-05-09

## 2024-05-09 DIAGNOSIS — N13.1 HYDRONEPHROSIS CONCURRENT WITH AND DUE TO URETERAL STRICTURE: Primary | ICD-10-CM

## 2024-05-09 PROCEDURE — 80048 BASIC METABOLIC PNL TOTAL CA: CPT

## 2024-05-09 PROCEDURE — 36415 COLL VENOUS BLD VENIPUNCTURE: CPT

## 2024-05-10 LAB
ANION GAP SERPL CALCULATED.3IONS-SCNC: 9 MMOL/L (ref 7–15)
BUN SERPL-MCNC: 17.3 MG/DL (ref 8–23)
CALCIUM SERPL-MCNC: 9.2 MG/DL (ref 8.8–10.2)
CHLORIDE SERPL-SCNC: 103 MMOL/L (ref 98–107)
CREAT SERPL-MCNC: 1.05 MG/DL (ref 0.51–0.95)
DEPRECATED HCO3 PLAS-SCNC: 24 MMOL/L (ref 22–29)
EGFRCR SERPLBLD CKD-EPI 2021: 57 ML/MIN/1.73M2
GLUCOSE SERPL-MCNC: 97 MG/DL (ref 70–99)
POTASSIUM SERPL-SCNC: 4.7 MMOL/L (ref 3.4–5.3)
SODIUM SERPL-SCNC: 136 MMOL/L (ref 135–145)

## 2024-05-13 ENCOUNTER — TELEPHONE (OUTPATIENT)
Dept: NURSING | Facility: CLINIC | Age: 74
End: 2024-05-13
Payer: COMMERCIAL

## 2024-05-13 NOTE — TELEPHONE ENCOUNTER
Requests for 5/9 results faxed to [facility]. Caller transferred to [phone]    Heidy Rebollar RN/[place] Nurse Advisor

## 2024-06-12 ENCOUNTER — LAB (OUTPATIENT)
Dept: LAB | Facility: CLINIC | Age: 74
End: 2024-06-12
Attending: HOSPITALIST
Payer: COMMERCIAL

## 2024-06-12 ENCOUNTER — OFFICE VISIT (OUTPATIENT)
Dept: VASCULAR SURGERY | Facility: CLINIC | Age: 74
End: 2024-06-12
Attending: HOSPITALIST
Payer: COMMERCIAL

## 2024-06-12 VITALS
HEIGHT: 65 IN | DIASTOLIC BLOOD PRESSURE: 68 MMHG | HEART RATE: 80 BPM | RESPIRATION RATE: 18 BRPM | SYSTOLIC BLOOD PRESSURE: 136 MMHG | WEIGHT: 115.9 LBS | BODY MASS INDEX: 19.31 KG/M2 | TEMPERATURE: 98.2 F

## 2024-06-12 DIAGNOSIS — Z71.89 ENCOUNTER FOR ANTICOAGULATION DISCUSSION AND COUNSELING: ICD-10-CM

## 2024-06-12 DIAGNOSIS — Z86.718 HISTORY OF DVT (DEEP VEIN THROMBOSIS): ICD-10-CM

## 2024-06-12 DIAGNOSIS — Z86.718 HISTORY OF DVT (DEEP VEIN THROMBOSIS): Primary | ICD-10-CM

## 2024-06-12 LAB — D DIMER PPP FEU-MCNC: 0.49 UG/ML FEU (ref 0–0.5)

## 2024-06-12 PROCEDURE — 36415 COLL VENOUS BLD VENIPUNCTURE: CPT

## 2024-06-12 PROCEDURE — 85379 FIBRIN DEGRADATION QUANT: CPT

## 2024-06-12 PROCEDURE — G0463 HOSPITAL OUTPT CLINIC VISIT: HCPCS | Performed by: HOSPITALIST

## 2024-06-12 PROCEDURE — 99204 OFFICE O/P NEW MOD 45 MIN: CPT | Performed by: HOSPITALIST

## 2024-06-12 RX ORDER — POTASSIUM CITRATE 10 MEQ/1
10 TABLET, EXTENDED RELEASE ORAL
COMMUNITY
Start: 2024-04-19

## 2024-06-12 RX ORDER — FERROUS SULFATE 325(65) MG
TABLET ORAL EVERY 24 HOURS
COMMUNITY
Start: 2024-05-09

## 2024-06-12 ASSESSMENT — PAIN SCALES - GENERAL: PAINLEVEL: MILD PAIN (3)

## 2024-06-12 NOTE — PROGRESS NOTES
"[facility]        Patient is here for a follow up for a second opinion to discuss hx DVT in right arm.  Patient has constant pain in right arm 3/10.  She also states she has pain in medial right thigh.   Patient states when using arm the pain worsens.     Pt is currently taking no meds that would impact our treatment plan.    /68   Pulse 80   Temp 98.2  F (36.8  C)   Resp 18   Ht 5' 5\" (1.651 m)   Wt 115 lb 14.4 oz (52.6 kg)   LMP 10/19/1980 (Approximate)   BMI 19.29 kg/m      The provider has been notified that the patient has concerns of right arm and leg pain.     Questions patient would like addressed today are: N/A.    Refills are needed: No    Has homecare services and agency name:  No           "

## 2024-06-12 NOTE — PROGRESS NOTES
VASCULAR MEDICINE CONSULT NOTE          LOCATION:  [facility]         Date of Service: 6/12/2024      Primary Care Provider: Lorenzo Dunn  Referring provider;  No ref. provider found      Reason for the visit/chief complaint:   History of RUE DVT- second opinion      HPI:  Raj Kim is a pleasant 73 year old female who presents to our Vascular Medicine clinic for the above mentioned reason.    Ms. Kim has past medical history significant for right upper extremity DVT.  This was first diagnosed 2.5 years ago on 12/22/2021.  Interestingly, at that time patient reports that she was completely asymptomatic however, has felt some thickening in the right upper arm skin that she mention to one of her providers.  This prompted right upper extremity venous duplex ultrasound showing extensive right internal jugular, subclavian, axillary and brachial DVT with basilic SVT.  She was started on rivaroxaban at that time and was following up with vascular IR Dr. Jimenez.  The provoking factor that was noted at that time is being on oral estrogen for which this was discontinued.  She was also seen by hematology (see below).  She additionally has history of bilateral lower extremity edema due to acquired lymphedema and a background of prior history of radiation for ovarian carcinoma when she was in her 30s around 1980 (previously seen by Dr. Spencer in 2015).    With regards to her right upper extremity DVT, she has had right upper extremity venous duplex ultrasound every 3 months from the diagnosis (see US results below).  Overall, she has had recannulization to her right upper extremity veins gradually throughout the months.  She was kept on rivaroxaban initially planned for 6 months.    She met with hematology 10/12/2022.  Risks and benefits of continuing anticoagulation were discussed this.  Her DVT was considered to be provoked due to oral estrogen therapy and stopping anticoagulation  was felt to be reasonable.  Similarly, if she chooses to continue anticoagulation, would be acceptable.    Patient continued anticoagulation however developed hematuria for which she was seen by urology and multiple occasions at [facility].  Her anticoagulation was discontinued at that time once hematuria started which has been at least a year since September 2023.    After her last visit with vascular IR/Dr. Jimenez September of last year 2023, it was felt that things have been stable and she remains asymptomatic with no symptoms of the right upper extremity.  No further imaging or anticoagulation was felt necessary and she was recommended to follow-up as needed.     Patient today reports that she does not get any right upper extremity swelling, pain or skin changes.  She had noted some tenderness mostly on the inner side of her right forearm that has been persistent for few months.  Has not noticed any significant provoking or relieving factors but definitely getting her muscles into adduction (pulling), makes the pain more noticeable.  It does not seem to be very severe but has been persistent.  No visible veins.  No redness or warmth.  Again no swelling.  No trauma.    Based on the most recent right upper extremity venous duplex ultrasound done 1.5 months ago 4/24/2024, she has evidence of chronic DVT in the right IJ otherwise, the rest of her veins have been fully compressible, patent, spontaneous and phasic.      Denies any prior DVT other than this as well as SVT.  No other bleeding other than the previous hematuria that was worked up by urology.  Denies family history of DVT.  She is up-to-date with age-appropriate cancer screening with yearly mammogram and up-to-date with colonoscopy.  She has had hysterectomy with bilateral oophorectomy as mentioned above back in 1980 ovarian cancer.  No smoking.      REVIEW OF SYSTEMS:    A 12 point ROS was reviewed and is negative except what is mentioned in HPI.  "      Past medical history, surgical history, medications, family history, social history and allergies were reviewed. Pertinent points mentioned under HPI.      OBJECTIVE:    Vital signs:  /68   Pulse 80   Temp 98.2  F (36.8  C)   Resp 18   Ht 5' 5\" (1.651 m)   Wt 115 lb 14.4 oz (52.6 kg)   LMP 10/19/1980 (Approximate)   BMI 19.29 kg/m    Wt Readings from Last 1 Encounters:   06/12/24 115 lb 14.4 oz (52.6 kg)     Body mass index is 19.29 kg/m .    Physical exam:  General appearance: Pleasant female in no apparent distress.    HEENT: NC/AT.    Neck: No jugular venous distension.   Heart: RRR. Normal S1, S2.   Extremities and skin: Right upper extremity with no swelling, normal color and temperature.  No dilated veins.  There is an area of mild tenderness on the medial forearm diffusely.  No cordlike feeling.  No visible veins.  No redness or warmth.  Palpable radial and ulnar pulses 2/2 bilaterally.  No Urschel's sign.  Neurological: Alert, awake and oriented         DIAGNOSTIC STUDIES:   Labs and diagnostics reviewed including outside records. Pertinent points are mentioned under HPI and assessment and plan sections.    Results for right upper extremity venous duplex ultrasound throughout the years:    12/22/2021:   1.  Extensive right internal jugular, subclavian, axillary, and brachial DVT.   2.  Right arm basilic SVT       3 months after:   Interval partial recanalization of the internal jugular and subclavian veins.    Unchanged occlusive thrombosis of the axillary vein.    Patent brachial vein.    Partial compressibility of the basilic vein.  Full compressibility of the cephalic vein.      3 months in 6/28/22:   Occlusive, chronic deep vein thrombosis involving the internal jugular and axillary veins.    Partially occlusive, chronic deep vein thrombosis involving the subclavian vein.    Interval recanalization of the basilic vein      3 months after an 9/27/22:  Partially occlusive, chronic " thrombus in the internal jugular vein which was previously occlusive.  Partially occlusive, chronic thrombus in the subclavian vein, unchanged.  Occlusive, chronic thrombus in the axillary vein, unchanged.  Patent radial, ulnar, cephalic and basilic veins, unchanged.      1 year after 9/19/2023:  Chronic, partially occlusive, thrombosis/scarring involving the right internal jugular vein.   Chronic occlusion of the axillary vein.   The remaining imaged veins are patent.       7 months after 4/24/24:  Chronic DVT /wall scarring in the right internal jugular vein [partially occlusive]; appears unchanged since last study in September 2023  No DVT in right subclavian, axillary, brachial, radial and ulnar veins  No thrombus in cephalic nor basilic veins         ASSESSMENT AND PLAN:    Partially provoked?  Right upper extremity extensive DVT 12/22/2021  Lower extremity acquired lymphedema: radiation-induced    Today, we went through Ms. Kim's history with regards to her right upper extremity extensive DVT.  As we all know, oral estrogen use is associated with increased risk of DVT.  However, in someone who has taken estrogen for closer to 30 years, it is a little bit puzzling to why would she get an extensive DVT in the upper extremity that is typically a less common location after all these years of use.  We understand that age is another risk factor and theoretically as she advances in age with being on estrogen, this might add more risk.  Nonetheless and if we would like to be more on the cautious side, we would consider this may be partially provoked.  Venous TOS is of low clinical suspicion at this point.    We overall reviewed that the recurrence risk of provoked versus unprovoked DVTs.  We also discussed HERDOO2 score.  This is typically applied for clear unprovoked episodes and might not be necessarily directly relevant.  However, to get an estimate about risk of recurrence she would score only for age above 65.   However, we do not have a D-dimer.  We discussed that D-dimer cutoff for this score was 0.25.  This being said, we understand that the normal D-dimer should be age-adjusted and that more data is needed to validate the score in patient is above 50.    This being said, one RCT (the PROLONG trial) and other prospective studies have shown that recurrent DVT risk has been associated with abnormal D-dimer in general outside of the HERDOO2 score. The PROLONG trial showed that patients with an abnormal d-dimer level 1 month after the discontinuation of anticoagulation have a significant incidence of recurrent venous thromboembolism when compared to people with normal D-dimer (,N Engl J Med 2006;355:8590-1704).  Is well-known, the risk is typically higher within the first 6 to 12 months.    Risk of bleeding at this time for Ms. Kim is low.   We could consider obtaining D-dimer. If D-dimer comes back normal, we could consider continuing with no anticoagulation and obtain serial D-dimer. (No consensus on serial D-dimer timing, in 1 study it was done 1 month after discontinuation than 3, 6 and 12 months. In others such as the PROLONG II prospective study, it was done every 2 months).  If there is any concern for abnormal D-dimer or if she prefers to reinitiate anticoagulation, then I would suggest a low preventive dose DOAC such as apixaban 2.5 mg twice daily or rivaroxaban 10 mg daily.     After shared decision-making, the patient elected to obtain D-dimer and will then make decision based on that.    Meanwhile, we reviewed her most recent venous duplex ultrasound.  As I shared with her, typically we do not do frequent follow-up ultrasounds given that it is less likely things will change dramatically after 6 months.  Also sure that persistent venous occlusion was not shown to be associated with risk of recurrence.     We additionally touched base on the possibility of post thrombotic syndrome.  We discussed that this is  less studied than post thrombotic syndrome and lower extremities but continues to be a concern in up to 25% of the patients.  She has no current signs of postthrombotic syndrome.  Recall that she never had any symptoms for her DVT when first diagnosed.      Recommendations:  Obtain D-dimer.  Will communicate with the patient after the results.  No indication to continue repeating right upper extremity venous duplex ultrasound unless patient develops new symptoms.  Discussed with patient that the right forearm tenderness is unlikely vascular and should consider other etiologies such as musculoskeletal.  Based on D-dimer, we will decide the future plan.  If normal for age, we will proceed with serial D-dimer at 3 months, 6 and 12 months.  Otherwise if elevated, would recommend preventative DOAC dose such as apixaban 2.5 mg twice daily or rivaroxaban 10 mg daily.      It was a pleasure meeting Ms. Kim in our clinic today.      Liat Blanc MD  Vascular Medicine  June 12, 2024    ADDENDUM:  D-dimer came back 0.49 which is low when adjusted for her age.  Therefore, it is reasonable to remain off anticoagulation and we we will proceed with serial D-dimer with the next repeat in 3 months then 6 then 12.  If all remain low, we will consider obtaining D-dimer once a year for couple years.    I attempted calling the patient to update her with no response.  Modusly message sent.  I added a D-dimer ordered for 3 months and we will continue to communicate with her through [employer] based on the results and documenting in the chart.    Liat Blanc MD

## 2024-06-12 NOTE — PATIENT INSTRUCTIONS
Moises Anthony,    Thank you for entrusting your care with us today. After your visit today with MD Liat Blanc this is the plan that was discussed at your appointment.    Get a d-dimer lab done.  You can go to lab today to have that done or call [phone] to schedule at any lab location.      If your lab is abnormal, Dr. Blanc will call you with the results.  If normal you will follow up in 1 year with Dr. Blanc.          I am including additional information on these things and our contact information if you have any questions or concerns.   Please do not hesitate to reach out to us if you felt we did not answer your questions or you are unsure of the treatment plan after your visit today. Our number is [phone].Thank you for trusting us with your care.         Again thank you for your time.

## 2024-06-26 ENCOUNTER — VIRTUAL VISIT (OUTPATIENT)
Dept: INTERNAL MEDICINE | Facility: CLINIC | Age: 74
End: 2024-06-26
Payer: COMMERCIAL

## 2024-06-26 DIAGNOSIS — E27.40 HYPOADRENALISM (H): ICD-10-CM

## 2024-06-26 DIAGNOSIS — E05.90 HYPERTHYROIDISM: ICD-10-CM

## 2024-06-26 DIAGNOSIS — R63.4 WEIGHT LOSS: Primary | ICD-10-CM

## 2024-06-26 PROCEDURE — 99214 OFFICE O/P EST MOD 30 MIN: CPT | Mod: 95 | Performed by: INTERNAL MEDICINE

## 2024-06-26 NOTE — PROGRESS NOTES
Raj is a 73 year old who is being evaluated via a billable video visit.    How would you like to obtain your AVS? MyChart  If the video visit is dropped, the invitation should be resent by: Send to e-mail at: [email or web address]  Will anyone else be joining your video visit? No    Assessment & Plan     Weight loss  Clinically there are no symptoms.Weight was 124 one year ago -14 pound weight loss,without symptoms. She is very concerned given her remote history of ovarian cancer. Will evaluate medically. If negative consider referral for endocrinology evaluation.   - CT Chest/Abdomen/Pelvis w Contrast  - Prealbumin  - CBC with platelets  - Comprehensive metabolic panel  - Prolactin  - CT Head w Contrast  - TSH with free T4 reflex  - Cortisol    Follow up next scheduled visit and as needed.     Subjective   Raj is a 73 year old, presenting for the following health issues:  Weight Loss (110 lbs at home scale; drinking ensure once daily - discuss medical testing/imaging/referral )  Because she is not gaining weight with increased eating she is worried. Not having cough, or diaphoresis or night sweats, appetite is good for her, and no change in her chronic bowel symptoms.  Remote history of ovarian cancer, s/p radiation.         6/26/2024    12:05 PM   Additional Questions   Roomed by LILA Farah         6/26/2024    12:05 PM   Patient Reported Additional Medications   Patient reports taking the following new medications none     Video Start Time: 5:03 PM    History of Present Illness       Reason for visit:  Ongoing weight loss    She eats 2-3 servings of fruits and vegetables daily.She consumes 1 sweetened beverage(s) daily.She exercises with enough effort to increase her heart rate 9 or less minutes per day.  She exercises with enough effort to increase her heart rate 3 or less days per week.   She is taking medications regularly.     Objective      PHYSICAL EXAM:  General Appearance: In no acute distress  There  "were no vitals filed for this visit.  Estimated body mass index is 19.29 kg/m  as calculated from the following:    Height as of 6/12/24: 1.651 m (5' 5\").    Weight as of 6/12/24: 52.6 kg (115 lb 14.4 oz).  PSYCHIATRIC: Oriented X 3, behavior and affect normal, thinking is clear     Video-Visit Details    Type of service:  Video Visit   Video End Time:5:25 PM  Originating Location (pt. Location): Home  Distant Location (provider location):  On-site  Platform used for Video Visit: Bee    Signed Electronically by: Lorenzo Dunn MD  "

## 2024-06-27 ENCOUNTER — LAB (OUTPATIENT)
Dept: LAB | Facility: CLINIC | Age: 74
End: 2024-06-27
Payer: COMMERCIAL

## 2024-06-27 DIAGNOSIS — R63.4 WEIGHT LOSS: ICD-10-CM

## 2024-06-27 DIAGNOSIS — E05.90 HYPERTHYROIDISM: ICD-10-CM

## 2024-06-27 DIAGNOSIS — E27.40 HYPOADRENALISM (H): ICD-10-CM

## 2024-06-27 LAB
ALBUMIN SERPL BCG-MCNC: 4.1 G/DL (ref 3.5–5.2)
ALP SERPL-CCNC: 56 U/L (ref 40–150)
ALT SERPL W P-5'-P-CCNC: 32 U/L (ref 0–50)
ANION GAP SERPL CALCULATED.3IONS-SCNC: 9 MMOL/L (ref 7–15)
AST SERPL W P-5'-P-CCNC: 35 U/L (ref 0–45)
BILIRUB SERPL-MCNC: 0.4 MG/DL
BUN SERPL-MCNC: 30.2 MG/DL (ref 8–23)
CALCIUM SERPL-MCNC: 9.7 MG/DL (ref 8.8–10.2)
CHLORIDE SERPL-SCNC: 103 MMOL/L (ref 98–107)
CORTIS SERPL-MCNC: 17 UG/DL
CREAT SERPL-MCNC: 1.11 MG/DL (ref 0.51–0.95)
DEPRECATED HCO3 PLAS-SCNC: 25 MMOL/L (ref 22–29)
EGFRCR SERPLBLD CKD-EPI 2021: 53 ML/MIN/1.73M2
ERYTHROCYTE [DISTWIDTH] IN BLOOD BY AUTOMATED COUNT: 14.6 % (ref 10–15)
GLUCOSE SERPL-MCNC: 97 MG/DL (ref 70–99)
HCT VFR BLD AUTO: 36.8 % (ref 35–47)
HGB BLD-MCNC: 11.9 G/DL (ref 11.7–15.7)
MCH RBC QN AUTO: 27.9 PG (ref 26.5–33)
MCHC RBC AUTO-ENTMCNC: 32.3 G/DL (ref 31.5–36.5)
MCV RBC AUTO: 86 FL (ref 78–100)
PLATELET # BLD AUTO: 355 10E3/UL (ref 150–450)
POTASSIUM SERPL-SCNC: 4.9 MMOL/L (ref 3.4–5.3)
PREALB SERPL-MCNC: 27.2 MG/DL (ref 20–40)
PROLACTIN SERPL 3RD IS-MCNC: 17 NG/ML (ref 5–23)
PROT SERPL-MCNC: 7.2 G/DL (ref 6.4–8.3)
RBC # BLD AUTO: 4.26 10E6/UL (ref 3.8–5.2)
SODIUM SERPL-SCNC: 137 MMOL/L (ref 135–145)
TSH SERPL DL<=0.005 MIU/L-ACNC: 3.3 UIU/ML (ref 0.3–4.2)
WBC # BLD AUTO: 9.1 10E3/UL (ref 4–11)

## 2024-06-27 PROCEDURE — 80053 COMPREHEN METABOLIC PANEL: CPT

## 2024-06-27 PROCEDURE — 84134 ASSAY OF PREALBUMIN: CPT

## 2024-06-27 PROCEDURE — 82533 TOTAL CORTISOL: CPT

## 2024-06-27 PROCEDURE — 84443 ASSAY THYROID STIM HORMONE: CPT

## 2024-06-27 PROCEDURE — 85027 COMPLETE CBC AUTOMATED: CPT

## 2024-06-27 PROCEDURE — 36415 COLL VENOUS BLD VENIPUNCTURE: CPT

## 2024-06-27 PROCEDURE — 84146 ASSAY OF PROLACTIN: CPT

## 2024-07-05 ENCOUNTER — MYC MEDICAL ADVICE (OUTPATIENT)
Dept: INTERNAL MEDICINE | Facility: CLINIC | Age: 74
End: 2024-07-05
Payer: COMMERCIAL

## 2024-07-17 ENCOUNTER — ANCILLARY PROCEDURE (OUTPATIENT)
Dept: MAMMOGRAPHY | Facility: CLINIC | Age: 74
End: 2024-07-17
Attending: INTERNAL MEDICINE
Payer: COMMERCIAL

## 2024-07-17 DIAGNOSIS — Z12.31 VISIT FOR SCREENING MAMMOGRAM: ICD-10-CM

## 2024-07-17 PROCEDURE — 77067 SCR MAMMO BI INCL CAD: CPT | Mod: TC | Performed by: RADIOLOGY

## 2024-07-17 PROCEDURE — 77063 BREAST TOMOSYNTHESIS BI: CPT | Mod: TC | Performed by: RADIOLOGY

## 2024-07-19 SDOH — HEALTH STABILITY: PHYSICAL HEALTH: ON AVERAGE, HOW MANY DAYS PER WEEK DO YOU ENGAGE IN MODERATE TO STRENUOUS EXERCISE (LIKE A BRISK WALK)?: 0 DAYS

## 2024-07-19 SDOH — HEALTH STABILITY: PHYSICAL HEALTH: ON AVERAGE, HOW MANY MINUTES DO YOU ENGAGE IN EXERCISE AT THIS LEVEL?: 0 MIN

## 2024-07-19 ASSESSMENT — SOCIAL DETERMINANTS OF HEALTH (SDOH): HOW OFTEN DO YOU GET TOGETHER WITH FRIENDS OR RELATIVES?: ONCE A WEEK

## 2024-07-23 ENCOUNTER — OFFICE VISIT (OUTPATIENT)
Dept: INTERNAL MEDICINE | Facility: CLINIC | Age: 74
End: 2024-07-23
Payer: COMMERCIAL

## 2024-07-23 VITALS
RESPIRATION RATE: 17 BRPM | OXYGEN SATURATION: 97 % | DIASTOLIC BLOOD PRESSURE: 60 MMHG | BODY MASS INDEX: 18.32 KG/M2 | HEIGHT: 66 IN | WEIGHT: 114 LBS | HEART RATE: 80 BPM | TEMPERATURE: 97 F | SYSTOLIC BLOOD PRESSURE: 114 MMHG

## 2024-07-23 DIAGNOSIS — M81.0 AGE-RELATED OSTEOPOROSIS WITHOUT CURRENT PATHOLOGICAL FRACTURE: Primary | ICD-10-CM

## 2024-07-23 DIAGNOSIS — N20.0 NEPHROLITHIASIS: ICD-10-CM

## 2024-07-23 DIAGNOSIS — R63.4 WEIGHT LOSS: ICD-10-CM

## 2024-07-23 DIAGNOSIS — I89.0 ACQUIRED LYMPHEDEMA OF LEG: ICD-10-CM

## 2024-07-23 DIAGNOSIS — Z85.43 HISTORY OF OVARIAN CANCER: ICD-10-CM

## 2024-07-23 DIAGNOSIS — Z86.718 HISTORY OF DEEP VENOUS THROMBOSIS: ICD-10-CM

## 2024-07-23 DIAGNOSIS — Z85.43 PERSONAL HISTORY OF OVARIAN CANCER: ICD-10-CM

## 2024-07-23 DIAGNOSIS — D64.9 CHRONIC ANEMIA: ICD-10-CM

## 2024-07-23 DIAGNOSIS — N13.1 HYDRONEPHROSIS CONCURRENT WITH AND DUE TO URETERAL STRICTURE: ICD-10-CM

## 2024-07-23 PROCEDURE — G0439 PPPS, SUBSEQ VISIT: HCPCS | Performed by: INTERNAL MEDICINE

## 2024-07-23 NOTE — PROGRESS NOTES
Preventive Care Visit  [facility]  Lorenzo Dunn MD, Internal Medicine  Jul 23, 2024    Assessment & Plan     Age-related osteoporosis without current pathological fracture  On prolia therapy.  Taking calcium and vitamin D3    Acquired lymphedema of leg  Essentially resolved.  Has minor varicosities    Hydronephrosis concurrent with and due to ureteral stricture  Asymptomatic and has seen urology in follow up.     Nephrolithiasis  Asymptomatic.     Chronic anemia  Iron levels have been OK.     History of deep venous thrombosis  In RUE, when on estrogen replacement.  She had hematuria on anticoagulation and that was stopped, with no recurrent DVT.     Personal history of ovarian cancer  remote    Weight loss  Recent lab testing has been negative. CT chest abdomen and pelvis is pending as well.  Not likely pathologic. Is this due to stopping her estrogen?  Restarting estrogen is contraindicated given the DVT history.      History of colon polyps  Colonoscopy 8/2023, due in 8/2026    Counseling  Appropriate preventive services were addressed with this patient via screening  Reviewed patient's diet, addressing concerns and/or questions.     Follow up yearly and as needed    Subjective   Raj is a 74 year old, presenting for the following:  Annual Visit        7/23/2024    11:22 AM   Additional Questions   Roomed by Bernabe GUEVARA RN   Health Care Directive  Patient has a Health Care Directive on file  Discussed advance care planning with patient.    HPI  Still worried about weight loss but no concerning symptoms.  Does try to eat to gain, and uses boost like supplements.  Not having nausea or abdominal pain, or any night sweats. The weight loss seems to have developed since her DVT of RUE and discontinuation of estrogen replacement.  Chronic nocturia persists, medications have not helped.  Mild anemia is likely physiologic and iron levels have been OK.  No unusual cough or dyspnea.  Recent lab  evaluation was normal.         7/19/2024   General Health   How would you rate your overall physical health? (!) FAIR   Feel stress (tense, anxious, or unable to sleep) Only a little      (!) STRESS CONCERN      7/19/2024   Nutrition   Diet: Low salt          7/19/2024   Exercise   Days per week of moderate/strenous exercise 0 days   Average minutes spent exercising at this level 0 min      (!) EXERCISE CONCERN      7/19/2024   Social Factors   Frequency of gathering with friends or relatives Once a week   Worry food won't last until get money to buy more No   Food not last or not have enough money for food? No   Do you have housing? (Housing is defined as stable permanent housing and does not include staying ouside in a car, in a tent, in an abandoned building, in an overnight shelter, or couch-surfing.) Yes   Are you worried about losing your housing? No   Lack of transportation? No   Unable to get utilities (heat,electricity)? No          7/23/2024   Fall Risk   Fallen 2 or more times in the past year? No   Trouble with walking or balance? No             7/19/2024   Activities of Daily Living- Home Safety   Needs help with the following daily activites None of the above   Safety concerns in the home None of the above          7/19/2024   Dental   Dentist two times every year? Yes          7/19/2024   Hearing Screening   Hearing concerns? None of the above          7/19/2024   Driving Risk Screening   Patient/family members have concerns about driving No          7/19/2024   General Alertness/Fatigue Screening   Have you been more tired than usual lately? No          7/19/2024   Urinary Incontinence Screening   Bothered by leaking urine in past 6 months Yes          7/19/2024   TB Screening   Were you born outside of the [place]? No      Today's PHQ-2 Score:       7/23/2024    11:17 AM   PHQ-2 ( 1999 [employer])   Q1: Little interest or pleasure in doing things 0   Q2: Feeling down, depressed or hopeless 0   PHQ-2 Score 0    Q1: Little interest or pleasure in doing things Not at all   Q2: Feeling down, depressed or hopeless Not at all   PHQ-2 Score 0         2024   Substance Use   Alcohol more than 3/day or more than 7/wk No   Do you have a current opioid prescription? No   How severe/bad is pain from 1 to 10? 0/10 (No Pain)   Do you use any other substances recreationally? No      Social History     Tobacco Use    Smoking status: Former     Current packs/day: 0.00     Average packs/day: 0.5 packs/day for 10.0 years (5.0 ttl pk-yrs)     Types: Cigarettes     Start date: 1977     Quit date: 1987     Years since quittin.5    Smokeless tobacco: Never    Tobacco comments:     Unsure how much per day   Vaping Use    Vaping status: Never Used   Substance Use Topics    Alcohol use: Yes     Comment: Occasionally    Drug use: Never         2024   LAST FHS-7 RESULTS   1st degree relative breast or ovarian cancer No   Any relative bilateral breast cancer No   Any male have breast cancer No   Any ONE woman have BOTH breast AND ovarian cancer No   Any woman with breast cancer before 50yrs No   2 or more relatives with breast AND/OR ovarian cancer No   2 or more relatives with breast AND/OR bowel cancer No      ASCVD Risk   The 10-year ASCVD risk score (Servando RIDER, et al., 2019) is: 8.9%    Values used to calculate the score:      Age: 74 years      Sex: Female      Is Non- : No      Diabetic: No      Tobacco smoker: No      Systolic Blood Pressure: 114 mmHg      Is BP treated: No      HDL Cholesterol: 47 mg/dL      Total Cholesterol: 142 mg/dL    Reviewed and updated as needed this visit by Provider    Past Medical History:   Diagnosis Date    Acute deep vein thrombosis (DVT) of axillary vein (H) 2021    Chronic anemia     normochromic, normocytic    Fecal incontinence 2020    Hx of radiation therapy     Hydronephrosis with urinary obstruction due to ureteral calculus     Lyme  disease 07/24/2016    Osteoporosis     Ovarian cancer (H) 01/01/1980    Peripheral neuropathy, secondary to drugs or chemicals     Radiation colitis     Sensorineural hearing loss, bilateral 05/02/2019    Vitamin B 12 deficiency     Weight loss 07/19/2023     Past Surgical History:   Procedure Laterality Date    FINGER SURGERY Bilateral 01/01/2016    Dr. Tubbs- [facility]    FOOT TENDON SURGERY Right     HYSTERECTOMY  01/01/1980    LAPAROSCOPIC CHOLECYSTECTOMY  2008    LAPAROTOMY EXPLORATORY  1980    ovary/omentum abnormalities    OOPHORECTOMY Bilateral 01/01/1980     Current providers sharing in care for this patient include:  Patient Care Team:  Lorenzo Dunn MD as PCP - General  Liat Blanc MD as Assigned Heart and Vascular Provider    The following health maintenance items are reviewed in Epic and correct as of today:  Health Maintenance   Topic Date Due    ANNUAL REVIEW OF  ORDERS  Never done    RSV VACCINE (Pregnancy & 60+) (1 - 1-dose 60+ series) Never done    COVID-19 Vaccine (8 - 2023-24 season) 01/19/2024    MEDICARE ANNUAL WELLNESS VISIT  07/19/2024    INFLUENZA VACCINE (1) 09/01/2024    DEXA  06/23/2025    FALL RISK ASSESSMENT  07/23/2025    MAMMO SCREENING  07/17/2026    COLORECTAL CANCER SCREENING  08/14/2026    GLUCOSE  06/27/2027    LIPID  07/19/2028    ADVANCE CARE PLANNING  07/24/2028    DTAP/TDAP/TD IMMUNIZATION (6 - Td or Tdap) 07/27/2030    HEPATITIS C SCREENING  Completed    PHQ-2 (once per calendar year)  Completed    Pneumococcal Vaccine: 65+ Years  Completed    ZOSTER IMMUNIZATION  Completed    IPV IMMUNIZATION  Aged Out    HPV IMMUNIZATION  Aged Out    MENINGITIS IMMUNIZATION  Aged Out    RSV MONOCLONAL ANTIBODY  Aged Out    LUNG CANCER SCREENING  Discontinued     Review of Systems  See HPI    Objective      PHYSICAL EXAM:  General Appearance: In no acute distress  Vitals:    07/23/24 1126   BP: 114/60   BP Location: Left arm   Patient Position: Sitting   Cuff Size: Adult Regular  "  Pulse: 80   Resp: 17   Temp: 97  F (36.1  C)   TempSrc: Tympanic   SpO2: 97%   Weight: 51.7 kg (114 lb)   Height: 1.664 m (5' 5.5\")     Estimated body mass index is 18.68 kg/m  as calculated from the following:    Height as of this encounter: 1.664 m (5' 5.5\").    Weight as of this encounter: 51.7 kg (114 lb).  EYES: Clear, fundi are unremarkable   HEENT: nose and throat clear, ears normal   NECK:  without cervical or axillary adenopathy  RESPIRATORY: Clear   CARDIOVASCULAR: S1, S2  ABDOMEN: soft, scaphoid, and non-tender  EXTREMITIES:  no significant inflammation or edema, mild varicosities  NEUROLOGIC: Speech is clear, gait is normal  PSYCHIATRIC: Oriented X 3,  thinking is clear         7/23/2024   Mini Cog   Clock Draw Score 2 Normal   3 Item Recall 2 objects recalled   Mini Cog Total Score 4        Cognitive function is intact today.     Signed Electronically by: Lorenzo Dunn MD  "

## 2024-07-25 ENCOUNTER — HOSPITAL ENCOUNTER (OUTPATIENT)
Dept: CT IMAGING | Facility: HOSPITAL | Age: 74
Discharge: HOME | End: 2024-07-25
Attending: INTERNAL MEDICINE | Admitting: INTERNAL MEDICINE
Payer: COMMERCIAL

## 2024-07-25 ENCOUNTER — TELEPHONE (OUTPATIENT)
Dept: INTERNAL MEDICINE | Facility: CLINIC | Age: 74
End: 2024-07-25
Payer: COMMERCIAL

## 2024-07-25 DIAGNOSIS — R63.4 WEIGHT LOSS: ICD-10-CM

## 2024-07-25 PROCEDURE — 71260 CT THORAX DX C+: CPT

## 2024-07-25 PROCEDURE — 250N000009 HC RX 250: Performed by: INTERNAL MEDICINE

## 2024-07-25 PROCEDURE — 250N000011 HC RX IP 250 OP 636: Performed by: INTERNAL MEDICINE

## 2024-07-25 RX ORDER — IOPAMIDOL 755 MG/ML
56 INJECTION, SOLUTION INTRAVASCULAR ONCE
Status: COMPLETED | OUTPATIENT
Start: 2024-07-25 | End: 2024-07-25

## 2024-07-25 RX ADMIN — IOPAMIDOL 56 ML: 755 INJECTION, SOLUTION INTRAVENOUS at 13:19

## 2024-07-25 RX ADMIN — SODIUM CHLORIDE 90 ML: 9 INJECTION, SOLUTION INTRAVENOUS at 13:19

## 2024-07-25 NOTE — TELEPHONE ENCOUNTER
Test Results    Contacts       Contact Date/Time Type Contact Phone/Fax    07/25/2024 03:08 PM CDT Phone (Incoming) [facility]- radiology             Who ordered the test:  Dr. Dunn    Type of test: CT    Date of test:  7/25/24    Where was the test performed:  [facility]    What are your questions/concerns?:  Radiologist Dr De La Cruz requesting call back to discuss results.     [phone]

## 2024-07-31 ENCOUNTER — TELEPHONE (OUTPATIENT)
Dept: INTERNAL MEDICINE | Facility: CLINIC | Age: 74
End: 2024-07-31
Payer: COMMERCIAL

## 2024-07-31 DIAGNOSIS — K86.89 DILATION OF PANCREATIC DUCT: Primary | ICD-10-CM

## 2024-07-31 NOTE — TELEPHONE ENCOUNTER
We discussed her abnormal chest and abdomen and pelvis CT.  She has a stone in right ureter with hydronephrosis.  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  The pancreas dilation was also discussed. I offered MRCP. She is asymptomatic and is comfortable waiting until follow up in October - 2 months.  Will contact us sooner if any symptoms develop. Dr. Shaun MD

## 2024-08-08 ENCOUNTER — TRANSFERRED RECORDS (OUTPATIENT)
Dept: HEALTH INFORMATION MANAGEMENT | Facility: CLINIC | Age: 74
End: 2024-08-08
Payer: COMMERCIAL

## 2024-08-22 ENCOUNTER — TELEPHONE (OUTPATIENT)
Dept: INTERNAL MEDICINE | Facility: CLINIC | Age: 74
End: 2024-08-22
Payer: COMMERCIAL

## 2024-08-22 NOTE — TELEPHONE ENCOUNTER
[facility]     Please contact patient to schedule 3 month chronic disease follow up per AWV on 7/23/24. (Due around 10/23/24).    Please include the following in appointment messages.     Please address dx at visit:  - Encounter for Attention to Other Artificial Openings of Urinary Tract (Z43.6): Percutaneous Nephrostomy Tube 3/14/24.  - Partial Intestinal Obstruction, Unspecified (K56.600): CT 6/6/23; Please assess if dx appropriate.

## 2024-08-26 ENCOUNTER — TELEPHONE (OUTPATIENT)
Dept: INTERNAL MEDICINE | Facility: CLINIC | Age: 74
End: 2024-08-26
Payer: COMMERCIAL

## 2024-08-26 DIAGNOSIS — M81.0 AGE-RELATED OSTEOPOROSIS WITHOUT CURRENT PATHOLOGICAL FRACTURE: Primary | ICD-10-CM

## 2024-08-26 DIAGNOSIS — Z92.29 PERSONAL HISTORY OF OTHER DRUG THERAPY: ICD-10-CM

## 2024-08-26 NOTE — TELEPHONE ENCOUNTER
Patient's last prolia was 3/8/2024, pt has an upcoming prolia injection appointment on 2024 at [facility].     If this patient is to continue with Prolia injection therapy a new, active prolia order is needed for the upcoming administration.     A Prolia order has been pended with the previously administered order's associated diagnoses; signing provider to review diagnoses to ensure these still apply to patient.    Diagnoses from order used during 3/8/2024 administration:   Age-related osteoporosis without current pathological fracture [M81.0]  - Primary      Personal history of other drug therapy [Z92.29]          Have previous orders been discontinued due to being ? Yes    Patient's most recent labs within the past year (Calcium, Albumin, Creatinine):      Latest Reference Range & Units 24 08:54   Creatinine 0.51 - 0.95 mg/dL 1.11 (H)   Calcium 8.8 - 10.2 mg/dL 9.7   Albumin 3.5 - 5.2 g/dL 4.1   (H): Data is abnormally high    Labs were completed within the past 6 months , labs were completed within last six months, no labs are pended for provider review. .     Prolia provider information with link to prescribing information: [email or web address]  Note: Per Prolia ordering smartset, an albumin level is recommended if Calcium level is < 8.5.     Provider action needed: please review/sign prolia order, review associated diagnoses, review/sign recent labs (if needed); please remove lab orders if not needed.  The following steps were completed to comply with the REMS program for Prolia:  Reviewed the serious risks of Prolia  and the symptoms of each risk.  Advised patient to seek prompt medical attention if they have signs or symptoms of any of the serious risks.  Patient will be provided a copy of the Medication Guide and Patient Brochure prior to first injection.    Robert Camarena RN

## 2024-08-30 NOTE — TELEPHONE ENCOUNTER
Call attempt 1/3.    Spoke with patient to schedule  3 month follow up  due  10/23/2024 . Patient did not want to schedule at this time. Please include RN Diagnosis notes from open encounter to the appointment notes of scheduled appointment.    Please close encounter when scheduled.      Pt doesn't want any further calls at this time.    Jamar Myrick

## 2024-09-09 ENCOUNTER — ALLIED HEALTH/NURSE VISIT (OUTPATIENT)
Dept: FAMILY MEDICINE | Facility: CLINIC | Age: 74
End: 2024-09-09
Payer: COMMERCIAL

## 2024-09-09 DIAGNOSIS — M81.0 AGE-RELATED OSTEOPOROSIS WITHOUT CURRENT PATHOLOGICAL FRACTURE: Primary | ICD-10-CM

## 2024-09-09 DIAGNOSIS — Z92.29 PERSONAL HISTORY OF OTHER DRUG THERAPY: ICD-10-CM

## 2024-09-09 PROCEDURE — 99207 PR NO CHARGE NURSE ONLY: CPT

## 2024-09-09 PROCEDURE — 96372 THER/PROPH/DIAG INJ SC/IM: CPT | Performed by: INTERNAL MEDICINE

## 2024-09-09 NOTE — PROGRESS NOTES
Clinic Administered Medication Documentation      Prolia Documentation    Indication: Prolia  (denosumab) is a prescription medicine used to treat osteoporosis in patients who:   Are at high risk for fracture, meaning patients who have had a fracture related to osteoporosis, or who have multiple risk factors for fracture.  Cannot use another osteoporosis medicine or other osteoporosis medicines did not work well.  The timeline for early/late injections would be 4 weeks early and any time after the 6 month darryl. If a patient receives their injection late, then the subsequent injection would be 6 months from the date that they actually received the injection.    When was the last injection?  2024  Was the last injection at least 6 months ago? Yes  Has the prior authorization been completed?  Yes  Is there an active order (written within the past 365 days, with administrations remaining, not ) in the chart?  Yes   GFR Estimate   Date Value Ref Range Status   2024 53 (L) >60 mL/min/1.73m2 Final     Comment:     eGFR calculated using  CKD-EPI equation.   2020 >60 >60 mL/min/1.73m2 Final     Has patient had a GFR within the last 12 months? Yes   Is GFR under 30, or patient has a diagnosis of CKD4 or CKD5? No   Patient denies gastric bypass or parathyroid surgery in past 6 months? Yes - patient denies.   Patient denies dental work in the past two months involving drilling into the bone, such as implants/extractions, oral surgery or a tooth extraction that has not healed yet?  Yes  Patient denies plans for an emergency tooth extraction within the next week? Yes    The following steps were completed to comply with the REMS program for Prolia:  Reviewed information in the Medication Guide, including the serious risks of Prolia  and the symptoms of each risk.  Advised patient to seek prompt medical attention if they have signs or symptoms of any of the serious risks.  Provided each patient a copy  of the Medication Guide and Patient Guide.    Prior to injection, verified patient identity using patient's name and date of birth. Medication was administered. Please see MAR and medication order for additional information. Patient instructed to remain in clinic for 15 minutes and report any adverse reaction to staff immediately.    Vial/Syringe: Syringe  Was this medication supplied by the patient? No  Verified that the patient has refills remaining in their prescription.

## 2024-09-16 ENCOUNTER — LAB (OUTPATIENT)
Dept: LAB | Facility: CLINIC | Age: 74
End: 2024-09-16
Payer: COMMERCIAL

## 2024-09-16 ENCOUNTER — MYC MEDICAL ADVICE (OUTPATIENT)
Dept: VASCULAR SURGERY | Facility: CLINIC | Age: 74
End: 2024-09-16

## 2024-09-16 DIAGNOSIS — Z86.718 HISTORY OF DVT (DEEP VEIN THROMBOSIS): ICD-10-CM

## 2024-09-16 LAB — D DIMER PPP FEU-MCNC: <0.27 UG/ML FEU (ref 0–0.5)

## 2024-09-16 PROCEDURE — 36415 COLL VENOUS BLD VENIPUNCTURE: CPT

## 2024-09-16 PROCEDURE — 85379 FIBRIN DEGRADATION QUANT: CPT

## 2024-09-18 ENCOUNTER — DOCUMENTATION ONLY (OUTPATIENT)
Dept: VASCULAR SURGERY | Facility: CLINIC | Age: 74
End: 2024-09-18
Payer: COMMERCIAL

## 2024-09-18 DIAGNOSIS — Z86.718 HISTORY OF DEEP VENOUS THROMBOSIS: Primary | ICD-10-CM

## 2024-09-18 NOTE — PROGRESS NOTES
3 months serial D Dimer came back low  < 0.27. Continue off anticoagulation. Plan if for 6 months repeat Ddimer expected around March 16, 2025. Ordered placed.   Patient was updated through MyChart.    Liat Blanc MD, [facility]  Vascular Medicine

## 2024-12-23 ENCOUNTER — TRANSFERRED RECORDS (OUTPATIENT)
Dept: HEALTH INFORMATION MANAGEMENT | Facility: CLINIC | Age: 74
End: 2024-12-23
Payer: COMMERCIAL

## 2025-01-06 ENCOUNTER — HOSPITAL ENCOUNTER (OUTPATIENT)
Dept: INTERVENTIONAL RADIOLOGY/VASCULAR | Facility: HOSPITAL | Age: 75
Discharge: HOME | End: 2025-01-06
Attending: INTERNAL MEDICINE | Admitting: STUDENT IN AN ORGANIZED HEALTH CARE EDUCATION/TRAINING PROGRAM
Payer: COMMERCIAL

## 2025-01-06 VITALS
SYSTOLIC BLOOD PRESSURE: 112 MMHG | BODY MASS INDEX: 18.44 KG/M2 | RESPIRATION RATE: 14 BRPM | OXYGEN SATURATION: 100 % | HEART RATE: 85 BPM | HEIGHT: 64 IN | DIASTOLIC BLOOD PRESSURE: 72 MMHG | WEIGHT: 108 LBS | TEMPERATURE: 97.7 F

## 2025-01-06 DIAGNOSIS — C25.9 PRIMARY PANCREATIC CANCER (H): ICD-10-CM

## 2025-01-06 PROCEDURE — C1788 PORT, INDWELLING, IMP: HCPCS

## 2025-01-06 PROCEDURE — 250N000011 HC RX IP 250 OP 636: Performed by: NURSE PRACTITIONER

## 2025-01-06 PROCEDURE — 99152 MOD SED SAME PHYS/QHP 5/>YRS: CPT

## 2025-01-06 PROCEDURE — C1769 GUIDE WIRE: HCPCS

## 2025-01-06 PROCEDURE — 99153 MOD SED SAME PHYS/QHP EA: CPT

## 2025-01-06 PROCEDURE — 272N000500 HC NEEDLE CR2

## 2025-01-06 RX ORDER — FLUMAZENIL 0.1 MG/ML
0.2 INJECTION, SOLUTION INTRAVENOUS
Status: DISCONTINUED | OUTPATIENT
Start: 2025-01-06 | End: 2025-01-07 | Stop reason: HOSPADM

## 2025-01-06 RX ORDER — HEPARIN SODIUM 200 [USP'U]/100ML
1 INJECTION, SOLUTION INTRAVENOUS EVERY 5 MIN PRN
Status: DISCONTINUED | OUTPATIENT
Start: 2025-01-06 | End: 2025-01-07 | Stop reason: HOSPADM

## 2025-01-06 RX ORDER — NALOXONE HYDROCHLORIDE 0.4 MG/ML
0.2 INJECTION, SOLUTION INTRAMUSCULAR; INTRAVENOUS; SUBCUTANEOUS
Status: DISCONTINUED | OUTPATIENT
Start: 2025-01-06 | End: 2025-01-07 | Stop reason: HOSPADM

## 2025-01-06 RX ORDER — LIDOCAINE 40 MG/G
CREAM TOPICAL
Status: DISCONTINUED | OUTPATIENT
Start: 2025-01-06 | End: 2025-01-07 | Stop reason: HOSPADM

## 2025-01-06 RX ORDER — HEPARIN SODIUM (PORCINE) LOCK FLUSH IV SOLN 100 UNIT/ML 100 UNIT/ML
5 SOLUTION INTRAVENOUS ONCE
Status: COMPLETED | OUTPATIENT
Start: 2025-01-06 | End: 2025-01-06

## 2025-01-06 RX ORDER — CLINDAMYCIN PHOSPHATE 900 MG/50ML
900 INJECTION, SOLUTION INTRAVENOUS
Status: COMPLETED | OUTPATIENT
Start: 2025-01-06 | End: 2025-01-06

## 2025-01-06 RX ORDER — NALOXONE HYDROCHLORIDE 0.4 MG/ML
0.4 INJECTION, SOLUTION INTRAMUSCULAR; INTRAVENOUS; SUBCUTANEOUS
Status: DISCONTINUED | OUTPATIENT
Start: 2025-01-06 | End: 2025-01-07 | Stop reason: HOSPADM

## 2025-01-06 RX ORDER — ONDANSETRON 2 MG/ML
4 INJECTION INTRAMUSCULAR; INTRAVENOUS
Status: DISCONTINUED | OUTPATIENT
Start: 2025-01-06 | End: 2025-01-07 | Stop reason: HOSPADM

## 2025-01-06 RX ORDER — FENTANYL CITRATE 50 UG/ML
25-50 INJECTION, SOLUTION INTRAMUSCULAR; INTRAVENOUS EVERY 5 MIN PRN
Status: DISCONTINUED | OUTPATIENT
Start: 2025-01-06 | End: 2025-01-07 | Stop reason: HOSPADM

## 2025-01-06 RX ADMIN — HEPARIN SODIUM (PORCINE) LOCK FLUSH IV SOLN 100 UNIT/ML 5 ML: 100 SOLUTION at 14:26

## 2025-01-06 RX ADMIN — FENTANYL CITRATE 50 MCG: 50 INJECTION, SOLUTION INTRAMUSCULAR; INTRAVENOUS at 14:12

## 2025-01-06 RX ADMIN — MIDAZOLAM HYDROCHLORIDE 1 MG: 1 INJECTION, SOLUTION INTRAMUSCULAR; INTRAVENOUS at 14:08

## 2025-01-06 RX ADMIN — CLINDAMYCIN PHOSPHATE 900 MG: 900 INJECTION, SOLUTION INTRAVENOUS at 13:40

## 2025-01-06 RX ADMIN — FENTANYL CITRATE 50 MCG: 50 INJECTION, SOLUTION INTRAMUSCULAR; INTRAVENOUS at 14:05

## 2025-01-06 RX ADMIN — MIDAZOLAM HYDROCHLORIDE 2 MG: 1 INJECTION, SOLUTION INTRAMUSCULAR; INTRAVENOUS at 14:02

## 2025-01-06 NOTE — SEDATION DOCUMENTATION
Patient Name: Raj Kim  Medical Record Number: [id number]  Today's Date: 1/6/2025    Procedure: port placement  Proceduralist: Dr. Lamb    Procedure Start: 1412  Procedure end: 1433  Sedation medications administered: 3 mg midazolam and 100 mcg fentanyl   Sedation time: 21 minutes    Other Notes: Pt arrived to IR room 1 from Pre/post bay 1. Consent reviewed. Pt denies any questions or concerns regarding procedure. Pt positioned supine and monitored per protocol. Pt tolerated procedure without any noted complications. VSS. Pt transferred back to Pre/post bay 1.

## 2025-01-06 NOTE — PRE-PROCEDURE
GENERAL PRE-PROCEDURE:   Procedure:  Port placement  Date/Time:  1/6/2025 1:41 PM    Written consent obtained?: Yes    Risks and benefits: Risks, benefits and alternatives were discussed    DC Plan: Appropriate discharge home plan in place for patients who are going home after procedure   Consent given by:  Patient  Patient states understanding of procedure being performed: Yes    Patient's understanding of procedure matches consent: Yes    Procedure consent matches procedure scheduled: Yes    Expected level of sedation:  Moderate  Appropriately NPO:  Yes  ASA Class:  3  Mallampati  :  Grade 2- soft palate, base of uvula, tonsillar pillars, and portion of posterior pharyngeal wall visible  Lungs:  Lungs clear with good breath sounds bilaterally  Heart:  Normal heart sounds and rate  History & Physical reviewed:  History and physical reviewed and no updates needed  Statement of review:  I have reviewed the lab findings, diagnostic data, medications, and the plan for sedation    Antibiotic: Ancef intra-procedure; order signed and held.

## 2025-01-06 NOTE — DISCHARGE INSTRUCTIONS
Port Placement Procedure Discharge Instructions:  You had a port placed. A port is a small medical device that is placed under the skin and is connected to a vein with a catheter (thin, flexible tube). Ports can be used to administer IV medications (including chemotherapy), fluids or blood products or for blood lab draws. Please follow the below instructions after your procedure:    Care Instructions:  - If you received sedation for your procedure, do not drive or operate heavy machinery for the rest of the day.  - You may shower beginning tomorrow (post procedure day #1). Do not scrub site until well healed; pat dry gently with a towel.  - You likely have skin adhesive over your port site. Skin adhesive works like a bandage to keep the site covered and protected. Do not use antibiotic ointment or creams/lotions over adhesive as it can break it down. The skin adhesive will peel off on its own (typically in 5-14 days).  - Avoid submerging the port site under water (ex: tub baths, Jacuzzis, lakes, hot tubs and pools) for 10 days or until your site is well healed.  - You may have some discomfort, minimal swelling, redness and/or bruising at your port site/procedure site. You may take over the counter pain medication for discomfort (follow the package directions) or apply an ice pack wrapped in a towel over the site (rotating 20 minutes with ice pack on and 20 minutes with ice pack off) for comfort as needed. It can take several days for these to resolve.  - Avoid heavy lifting (greater than 10 pounds) and strenuous activities for 2 days following your procedure.   - If you experience significant bleeding at site, apply pressure with hands above the clavicle bone, sit upright and seek immediate medical assistance.  - Ports need to be flushed approximately every 4-6 weeks, if not being used more frequently. Follow up with the provider who ordered your port placement for further instructions for this.    Seek medical  evaluation or contact [facility] RN Line at [phone] if you experience the following:  - Uncontrolled bleeding from port site  - Fever (greater than 101 F (38.3C))  - Purulent (yellow/green/foul smelling) drainage from port insertion site  - Increasing pain at port site  - Increasing redness at port site

## 2025-01-09 ENCOUNTER — TELEPHONE (OUTPATIENT)
Dept: VASCULAR SURGERY | Facility: CLINIC | Age: 75
End: 2025-01-09
Payer: COMMERCIAL

## 2025-01-09 NOTE — TELEPHONE ENCOUNTER
"Caller: Raj    Provider: MD Liat Blanc    Detailed reason for call: Patient asking for a follow up visit with Dr. Blanc and thinking that she will need to have an ultrasound done prior to that appointment.  She states that she recently had a port for her cancer that was almost placed on the right side and when they did the ultrasound on the neck, they noted blood clotting in \"the vein in her neck\".  She is worried and would like to see/know what is going on with her blood clots.  She did schedule her Dtimer for in March and is thinking that would be a good time to follow up with Dr. Blanc as well.  Patient was last seen June 2024. Please advise for scheduling.     Best phone number to contact: [phone]    Best time to contact: any    Ok to leave a detailed message: Yes    Ok to speak to authorized person if needed: No      (Noted to patient if reason is related to wound or incision, to please send a photo via email or [employer].)    "

## 2025-01-09 NOTE — TELEPHONE ENCOUNTER
Spoke with Raj and she had her port placed in her left chest for chemotherapy and will begin using tomorrow. She reached out to hematology and is waiting for a call back. Discussed if she needed anything she was wondering if she should be scheduled to see Dr. Blanc. Last seen 6/12/24 and follow-up in 1 year and scheduling will reach out to her about 3 months before.

## 2025-01-20 ENCOUNTER — TELEPHONE (OUTPATIENT)
Dept: INTERVENTIONAL RADIOLOGY/VASCULAR | Facility: HOSPITAL | Age: 75
End: 2025-01-20
Payer: COMMERCIAL

## 2025-01-20 NOTE — TELEPHONE ENCOUNTER
Call returned to patient.  Patient informed that per procedural documentation, PORT placed on 1/6/25 is power injectable.    Lyla GUEVARA  Interventional Radiology RN

## 2025-01-31 ENCOUNTER — TRANSFERRED RECORDS (OUTPATIENT)
Dept: HEALTH INFORMATION MANAGEMENT | Facility: CLINIC | Age: 75
End: 2025-01-31
Payer: COMMERCIAL

## 2025-02-04 ENCOUNTER — TRANSFERRED RECORDS (OUTPATIENT)
Dept: HEALTH INFORMATION MANAGEMENT | Facility: CLINIC | Age: 75
End: 2025-02-04
Payer: COMMERCIAL

## 2025-03-04 ENCOUNTER — OFFICE VISIT (OUTPATIENT)
Dept: INTERNAL MEDICINE | Facility: CLINIC | Age: 75
End: 2025-03-04
Payer: COMMERCIAL

## 2025-03-04 VITALS
HEIGHT: 64 IN | WEIGHT: 108 LBS | HEART RATE: 100 BPM | RESPIRATION RATE: 17 BRPM | OXYGEN SATURATION: 95 % | DIASTOLIC BLOOD PRESSURE: 56 MMHG | SYSTOLIC BLOOD PRESSURE: 108 MMHG | BODY MASS INDEX: 18.44 KG/M2 | TEMPERATURE: 97.7 F

## 2025-03-04 DIAGNOSIS — R63.4 WEIGHT LOSS: ICD-10-CM

## 2025-03-04 DIAGNOSIS — Z29.11 NEED FOR VACCINATION AGAINST RESPIRATORY SYNCYTIAL VIRUS: ICD-10-CM

## 2025-03-04 DIAGNOSIS — I80.02 THROMBOPHLEBITIS OF SUPERFICIAL VEINS OF LEFT LOWER EXTREMITY: ICD-10-CM

## 2025-03-04 DIAGNOSIS — C24.1 PRIMARY ADENOCARCINOMA OF AMPULLA OF VATER (H): Primary | ICD-10-CM

## 2025-03-04 DIAGNOSIS — D64.9 CHRONIC ANEMIA: ICD-10-CM

## 2025-03-04 PROBLEM — R15.2 FECAL URGENCY: Status: RESOLVED | Noted: 2023-02-28 | Resolved: 2025-03-04

## 2025-03-04 RX ORDER — OLANZAPINE 5 MG/1
5 TABLET ORAL AT BEDTIME
COMMUNITY
Start: 2024-12-11 | End: 2025-12-11

## 2025-03-04 RX ORDER — PANCRELIPASE 60000; 12000; 38000 [USP'U]/1; [USP'U]/1; [USP'U]/1
3 CAPSULE, DELAYED RELEASE PELLETS ORAL
COMMUNITY
Start: 2024-10-17

## 2025-03-04 RX ORDER — ONDANSETRON 8 MG/1
4 TABLET, FILM COATED ORAL EVERY 8 HOURS PRN
COMMUNITY
Start: 2024-12-11 | End: 2025-12-11

## 2025-03-04 RX ORDER — PROCHLORPERAZINE MALEATE 10 MG
10 TABLET ORAL EVERY 6 HOURS PRN
COMMUNITY
Start: 2024-12-11 | End: 2025-12-11

## 2025-03-04 RX ORDER — OMEPRAZOLE 40 MG/1
40 CAPSULE, DELAYED RELEASE ORAL DAILY
COMMUNITY
Start: 2025-01-01

## 2025-03-04 RX ORDER — METHOCARBAMOL 750 MG/1
500 TABLET, FILM COATED ORAL DAILY PRN
COMMUNITY
Start: 2025-02-04

## 2025-03-04 RX ORDER — LIDOCAINE AND PRILOCAINE 25; 25 MG/G; MG/G
1 CREAM TOPICAL PRN
COMMUNITY
Start: 2025-01-10

## 2025-03-04 RX ORDER — CAPECITABINE 500 MG/1
1000 TABLET, FILM COATED ORAL 2 TIMES DAILY
COMMUNITY
Start: 2025-01-03

## 2025-03-04 ASSESSMENT — PAIN SCALES - GENERAL: PAINLEVEL_OUTOF10: MILD PAIN (3)

## 2025-03-04 NOTE — PROGRESS NOTES
ASSESSMENT AND PLAN:    1. 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  CEA is low above normal.  She worries significantly.  Reassured that her surgery went well, and she is tolerating the chemotherapy pretty well.      2. Chronic anemia  Hemoglobin 11.2  2 months ago. Chemotherapy is monitoring.      3. Weight loss  Only 5 pounds.  We discussed diet. Reassured that with whipple, she will likely be on the low side of body weight, and if she maintains vitamins, and electrolytes, and minerals, she will be OK.     4. Thrombophlebitis of superficial veins of left lower extremity  An active area of the left upper lateral thigh, mildly erythematous, and tender, clearly superficial, no edema of the ankle or foot.  Reassured and she should treat with warm compresses, and follow up if fails to resolve.  This doesn't require systemic anticoagulation.     Follow up as needed and this 3 months.     The longitudinal plan of care for the diagnosis(es)/condition(s) as documented were addressed during this visit. Due to the added complexity in care, I will continue to support Raj in the subsequent management and with ongoing continuity of care.    CHIEF COMPLAINT:  Left soreness, ongoing chemotherapy, s/p whipple procedure.     HISTORY OF PRESENT ILLNESS:  Raj Kim is a 74 year old female here with a soreness of the LLE.  No trauma.  Is stressed about her chemotherapy and the fact that her CEA has gone up some, but is still quite low.  Not having chest pain or dyspnea or cough.  Weight is stable, her appetite is poor, taste is altered.  Sleeping OK.  No significant pain and no abdominal pain.      REVIEW OF SYSTEMS:   See HPI, all other systems on review are negative.    Past Medical History:   Diagnosis Date    Acute deep vein thrombosis (DVT) of axillary vein (H) 12/22/2021    Chronic anemia     normochromic, normocytic    Dilation of pancreatic duct     Fecal  "incontinence 07/27/2020    Hx of radiation therapy     Hydronephrosis with urinary obstruction due to ureteral calculus     Lyme disease 07/24/2016    Osteoporosis     Ovarian cancer (H) 01/01/1980    Peripheral neuropathy, secondary to drugs or chemicals     Primary adenocarcinoma of ampulla of Vater (H)     Radiation colitis     Sensorineural hearing loss, bilateral 05/02/2019    Thrombophlebitis of superficial veins of left lower extremity 03/04/2025    Vitamin B 12 deficiency     Weight loss 07/19/2023     History   Smoking Status    Former    Types: Cigarettes   Smokeless Tobacco    Never     Family History   Problem Relation Age of Onset    Arthritis Mother     Dementia Mother     Diabetes Father     Bladder Cancer Sister     Other Cancer Sister     No Known Problems Sister     Diabetes Type 2  Brother     Breast Cancer Maternal Grandmother     Colon Cancer Maternal Aunt     Breast Cancer Maternal Aunt     Diabetes Brother     Colon Cancer Other      Past Surgical History:   Procedure Laterality Date    FINGER SURGERY Bilateral 01/01/2016    Dr. [facility]    FOOT TENDON SURGERY Right     HYSTERECTOMY  01/01/1980    IR CHEST PORT PLACEMENT > 5 YRS OF AGE  1/6/2025    LAPAROSCOPIC CHOLECYSTECTOMY  2008    LAPAROTOMY EXPLORATORY  1980    ovary/omentum abnormalities    OOPHORECTOMY Bilateral 01/01/1980     Allergies   Allergen Reactions    Cephalosporins Hives and Other (See Comments)     Diarrhea    Penicillins Hives and Rash    Celecoxib Headache and Other (See Comments)    Hydrocodone-Acetaminophen Headache    Hydrocodone-Acetaminophen Headache    Oxycodone Other (See Comments)    Hydrocodone Diarrhea    Nabumetone Diarrhea     VITALS:  Vitals:    03/04/25 1002   BP: 108/56   BP Location: Left arm   Patient Position: Sitting   Cuff Size: Adult Small   Pulse: 100   Resp: 17   Temp: 97.7  F (36.5  C)   TempSrc: Temporal   SpO2: 95%   Weight: 49 kg (108 lb)   Height: 1.626 m (5' 4\")     Estimated body " "mass index is 18.54 kg/m  as calculated from the following:    Height as of this encounter: 1.626 m (5' 4\").    Weight as of this encounter: 49 kg (108 lb).  Wt Readings from Last 3 Encounters:   03/04/25 49 kg (108 lb)   01/06/25 49 kg (108 lb)   07/23/24 51.7 kg (114 lb)     PHYSICAL EXAM:  Constitutional:  In NAD, alert and oriented  Neck: no cervical or axillary adenopathy  Cardiac:  S1 S2   Lungs: Clear   Abdomen:   Soft, flat and non-tender   Psychiatric:  Mood and behavior are sad tearful, but composed, thinking is clear.     DECISION TO OBTAIN OLD RECORDS AND/OR OBTAIN HISTORY FROM SOMEONE OTHER THAN PATIENT, AND/OR ACCESSING CARE EVERYWHERE):  1  0     REVIEW AND SUMMARIZATION OF OLD RECORDS, AND/OR OBTAINING HISTORY FROM SOMEONE OTHER THAN PATIENT, AND/OR DISCUSSION OF CASE WITH ANOTHER HEALTH CARE PROVIDER:  2 reviewed past health notes    REVIEW AND/OR ORDER OF OF CLINICAL LAB TESTS: 1  reviewed recent labs.    REVIEW AND/OR ORDER OF RADIOLOGY TESTS: 1 0.    REVIEW AND/OR ORDER OF MEDICAL TESTS (EKG/ECHO/COLONOSCOPY/EGD): 1  0    INDEPENDENT  VISUALIZATION OF IMAGE, TRACING, OR SPECIMEN ITSELF (2 EACH):  0     TOTAL: 3    Current Outpatient Medications   Medication Sig Dispense Refill    betamethasone dipropionate (DIPROLENE) 0.05 % ointment Apply topically as needed      capecitabine (XELODA) 500 MG tablet Take 1,000 mg by mouth 2 times daily.      CREON 98231-42041 units CPEP per EC capsule Take 3 capsules by mouth 3 times daily (with meals). 1 capsule with snacks      desonide (DESOWEN) 0.05 % cream Apply topically as needed      gabapentin (NEURONTIN) 100 MG capsule Take 2 capsules by mouth every 24 hours      Lactobacillus rhamnosus GG (CULTURELLE) 10-15 Billion cell capsule [LACTOBACILLUS RHAMNOSUS GG (CULTURELLE) 10-15 BILLION CELL CAPSULE] Take 1 capsule by mouth daily.      lidocaine-prilocaine (EMLA) 2.5-2.5 % external cream Apply 1 Application topically as needed for moderate pain.      " Magnesium 400 MG TABS Take by mouth daily      methocarbamol (ROBAXIN) 750 MG tablet Take 500 mg by mouth daily as needed for muscle spasms.      OLANZapine (ZYPREXA) 5 MG tablet Take 5 mg by mouth at bedtime.      omeprazole (PRILOSEC) 40 MG DR capsule Take 40 mg by mouth daily.      ondansetron (ZOFRAN) 8 MG tablet Take 4 mg by mouth every 8 hours as needed for nausea.      prochlorperazine (COMPAZINE) 10 MG tablet Take 10 mg by mouth every 6 hours as needed for nausea.      psyllium with dextrose (METAMUCIL JAR) powder 1 teaspoon daily      valACYclovir (VALTREX) 500 MG tablet Take 500 mg by mouth as needed      calcium carbonate (TUMS) 500 MG chewable tablet Take 2 tablets (1,000 mg) by mouth 2 times daily      cholecalciferol, vitamin D3, (VITAMIN D3) 2,000 unit capsule [CHOLECALCIFEROL, VITAMIN D3, (VITAMIN D3) 2,000 UNIT CAPSULE] Take 5,000 Units by mouth daily.             multivitamin with minerals (THERA-M) 9 mg iron-400 mcg Tab tablet [MULTIVITAMIN WITH MINERALS (THERA-M) 9 MG IRON-400 MCG TAB TABLET] Take 1 tablet by mouth daily.      potassium citrate (UROCIT-K) 10 MEQ (1080 MG) CR tablet Take 10 mEq by mouth       Lorenzo Dunn MD  Internal Medicine  [facility]

## 2025-03-05 ENCOUNTER — MYC MEDICAL ADVICE (OUTPATIENT)
Dept: INTERVENTIONAL RADIOLOGY/VASCULAR | Facility: CLINIC | Age: 75
End: 2025-03-05
Payer: COMMERCIAL

## 2025-03-07 ENCOUNTER — TRANSFERRED RECORDS (OUTPATIENT)
Dept: HEALTH INFORMATION MANAGEMENT | Facility: CLINIC | Age: 75
End: 2025-03-07
Payer: COMMERCIAL

## 2025-03-10 ENCOUNTER — ALLIED HEALTH/NURSE VISIT (OUTPATIENT)
Dept: FAMILY MEDICINE | Facility: CLINIC | Age: 75
End: 2025-03-10
Payer: COMMERCIAL

## 2025-03-10 DIAGNOSIS — M81.0 AGE-RELATED OSTEOPOROSIS WITHOUT CURRENT PATHOLOGICAL FRACTURE: Primary | ICD-10-CM

## 2025-03-10 PROCEDURE — 96372 THER/PROPH/DIAG INJ SC/IM: CPT | Performed by: INTERNAL MEDICINE

## 2025-03-10 PROCEDURE — 99207 PR NO CHARGE NURSE ONLY: CPT

## 2025-03-10 NOTE — PROGRESS NOTES
Interventional Radiology - Pre-Procedure Evaluation:  Outpatient - [facility]  03/10/2025     Procedure Requested: port removal and new placement  Requested by: MAGDALENA Eller    History and Physical Reviewed: H&P documented within 30 days (by Dr. Carranza on 3/5/25). I have personally reviewed the patient's medical history and have updated the medical record as necessary.    HPI: Raj Kim is a 74 year old female with PMHx pancreatic adenocarcinoma s/p whipple resection. Currently on chemotherapy. S/p right sided port placement 1/6/25    Chart review notes skin breakdown at incision site. Patient presenting today for port removal and new placement.     IMAGING:  Study Result    Narrative & Impression   [facility]  LOCATION: [facility]  DATE: 1/6/2025     PROCEDURE: IMPLANTABLE VENOUS CHEST PORT PLACEMENT (POWER INJECTABLE)     INTERVENTIONAL RADIOLOGIST: Robert Lamb MD.     INDICATION: Pancreatic malignancy. Chest port placement requested for chemotherapy.     CONSENT: The risks, benefits and alternatives of implantable venous chest port placement were discussed with the patient in detail. All questions were answered. Informed consent was given to proceed with the procedure.     MODERATE SEDATION: Versed 3 mg IV; Fentanyl 100 mcg IV.  During the timeout, immediately prior to the administration of medications, the patient was reassessed for adequacy to receive conscious sedation. Under physician supervision, Versed and fentanyl   were administered for moderate sedation. Pulse oximetry, heart rate and blood pressure were continuously monitored by an independent trained observer. The physician spent 20 minutes of face-to-face sedation time with the patient.     CONTRAST: None.  ANTIBIOTICS: 900 mg clindamycin  ADDITIONAL MEDICATIONS: None.     FLUOROSCOPIC TIME: 2.2 minutes.  RADIATION DOSE: Air Kerma: 8 mGy.     COMPLICATIONS: No immediate complications.      STERILE BARRIER TECHNIQUE: Maximum sterile barrier technique was used. Cutaneous antisepsis was performed at the operative site with application of 2% chlorhexidine and large sterile drape. Prior to the procedure, the  and assistant performed   hand hygiene and wore hat, mask, sterile gown, and sterile gloves during the entire procedure.     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     Using real-time ultrasound guidance the left internal jugular vein was accessed.     A subcutaneous pocket was created and irrigated with sterile normal saline. The catheter tubing was tunneled in an antegrade fashion from the port pocket to the dermatotomy site. Over a guidewire, and under direct fluoroscopic visualization a peel-away   sheath was advanced over the wire. Through the peel-away sheath, the catheter tubing was advanced until the tip was at the cavoatrial junction. The catheter tubing was cut to length and attached firmly to the port. The port was placed within the   subcutaneous pocket and tested. The port pocket incision was closed with layered absorbable suture and surgical glue. The dermatotomy site was closed with surgical glue.      FINDINGS:  Ultrasound demonstrates an anechoic and compressible LEFT jugular vein. 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 A permanent image was stored.     At the completion of the study the port tip lies near the cavoatrial junction.                                                                      IMPRESSION:    1.  Successful implantable venous chest port placement as detailed above.  2.  The implantable venous chest port was placed under fluoroscopic guidance and is ready for use immediately.  3.  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             NPO: midnight  ANTICOAGULANTS/ANTIPLATELETS: none  ANTIBIOTICS: Ancef 2g recommended if new port  "placement    ALLERGIES:  Allergies   Allergen Reactions    Cephalosporins Hives and Other (See Comments)     Diarrhea    Penicillins Hives and Rash    Celecoxib Headache and Other (See Comments)    Hydrocodone-Acetaminophen Headache    Hydrocodone-Acetaminophen Headache    Oxycodone Other (See Comments)    Hydrocodone Diarrhea    Nabumetone Diarrhea         LABS:  No results found for: \"INR\"   Hemoglobin   Date Value Ref Range Status   06/27/2024 11.9 11.7 - 15.7 g/dL Final     Platelet Count   Date Value Ref Range Status   06/27/2024 355 150 - 450 10e3/uL Final     Creatinine   Date Value Ref Range Status   06/27/2024 1.11 (H) 0.51 - 0.95 mg/dL Final     Potassium   Date Value Ref Range Status   06/27/2024 4.9 3.4 - 5.3 mmol/L Final   05/26/2022 4.6 3.5 - 5.0 mmol/L Final         EXAM:  Pulse 77   Temp 98.1  F (36.7  C) (Oral)   Resp 18   LMP 10/19/1980 (Approximate)   SpO2 100%   General: Stable. In no acute distress.    Neuro: Alert and oriented x 3. No focal deficits.  Psych: Appropriate mood and affect. Linear/coherent thought process.   Resp: Normal respirations. Lungs clear to auscultation bilaterally.  Cardio: S1S2, regular rate and rhythm, without murmur, clicks or rubs  Skin: Warm and dry. Without excoriations, ecchymosis, erythema, lesions or open sores on right upper chest near port insertion site. Near mid incision site open area with port exposure. No purulent drainage.      PRE-SEDATION ASSESSMENT:  Mallampati Airway Classification:  II - Faucial pillars and soft palate may be seen, but uvula is masked by the base of the tongue  Previous reaction to anesthesia/sedation:  No  Sedation plan based on assessment: Moderate (conscious) sedation  ASA Classification: Class 3 - SEVERE SYSTEMIC DISEASE, DEFINITE FUNCTIONAL LIMITATIONS.   Code Status: FULL CODE      ASSESSMENT/PLAN:   Case reviewed by Dr. Thrasher - recommending port removal with antibiotic course as outpatient and with PICC line placement " if patient prefers in interim prior to new port placement in approximately two weeks.    Left sided port removal with sedation. Single lumen peripherally inserted central venous catheter placement.    Procedural education reviewed with patient in detail including, but not limited to risks, benefits and alternatives with understanding verbalized by patient.    ADDENDUM:  -Patient to return this week for repacking   - Oral antibiotics sent to patient's preferred pharmacy  -Return in approximately two weeks for new port placement and PICC line removal    Total time spent on the date of the encounter: 45 minutes.      SHAKIRA Barrera CNP  Interventional Radiology

## 2025-03-10 NOTE — PROGRESS NOTES
Clinic Administered Medication Documentation      Prolia Documentation    Indication: Prolia  (denosumab) is a prescription medicine used to treat osteoporosis in patients who:   Are at high risk for fracture, meaning patients who have had a fracture related to osteoporosis, or who have multiple risk factors for fracture.  Cannot use another osteoporosis medicine or other osteoporosis medicines did not work well.  The timeline for early/late injections would be 4 weeks early and any time after the 6 month darryl. If a patient receives their injection late, then the subsequent injection would be 6 months from the date that they actually received the injection.    When was the last injection?  24  Was the last injection at least 6 months ago? Yes  Has the prior authorization been completed?  Yes  Is there an active order (written within the past 365 days, with administrations remaining, not ) in the chart?  Yes   GFR Estimate   Date Value Ref Range Status   2024 53 (L) >60 mL/min/1.73m2 Final     Comment:     eGFR calculated using  CKD-EPI equation.   2020 >60 >60 mL/min/1.73m2 Final     Has patient had a GFR within the last 12 months? Yes   Is GFR under 30, or patient has a diagnosis of CKD4 or CKD5? No   Patient denies gastric bypass or parathyroid surgery in past 6 months? Yes - patient denies.   Patient denies undergoing any dental procedures involving drilling into the bone, such as implants, extractions, or oral surgery, within the past two months that have not yet healed?  Yes - patient denies  Patient denies plans for an emergency tooth extraction within the next week? Yes    The following steps were completed to comply with the REMS program for Prolia:  Reviewed information in the Medication Guide, including the serious risks of Prolia  and the symptoms of each risk.  Advised patient to seek prompt medical attention if they have signs or symptoms of any of the serious risks.  Provided  each patient a copy of the Medication Guide and Patient Guide.    Prior to injection, verified patient identity using patient's name and date of birth. Medication was administered. Please see MAR and medication order for additional information. Patient instructed to remain in clinic for 15 minutes and report any adverse reaction to staff immediately.    Vial/Syringe: Syringe  Was this medication supplied by the patient? No  Verified that the patient has administrations remaining in their prescription.

## 2025-03-11 ENCOUNTER — HOSPITAL ENCOUNTER (OUTPATIENT)
Dept: INTERVENTIONAL RADIOLOGY/VASCULAR | Facility: HOSPITAL | Age: 75
Discharge: HOME | End: 2025-03-11
Attending: PHYSICIAN ASSISTANT
Payer: COMMERCIAL

## 2025-03-11 ENCOUNTER — HOSPITAL ENCOUNTER (OUTPATIENT)
Dept: INTERVENTIONAL RADIOLOGY/VASCULAR | Facility: HOSPITAL | Age: 75
Discharge: HOME | End: 2025-03-11
Attending: NURSE PRACTITIONER
Payer: COMMERCIAL

## 2025-03-11 ENCOUNTER — TELEPHONE (OUTPATIENT)
Dept: WOUND CARE | Facility: CLINIC | Age: 75
End: 2025-03-11

## 2025-03-11 VITALS
DIASTOLIC BLOOD PRESSURE: 65 MMHG | HEART RATE: 80 BPM | TEMPERATURE: 97.8 F | OXYGEN SATURATION: 98 % | SYSTOLIC BLOOD PRESSURE: 140 MMHG | RESPIRATION RATE: 16 BRPM

## 2025-03-11 DIAGNOSIS — T80.219A INFECTED VENOUS ACCESS PORT: Primary | ICD-10-CM

## 2025-03-11 DIAGNOSIS — Z45.2 EXHAUSTED VASCULAR ACCESS: Primary | ICD-10-CM

## 2025-03-11 DIAGNOSIS — C25.0 PRIMARY CANCER OF HEAD OF PANCREAS (H): ICD-10-CM

## 2025-03-11 DIAGNOSIS — T80.219A: Primary | ICD-10-CM

## 2025-03-11 DIAGNOSIS — Z45.2 EXHAUSTED VASCULAR ACCESS: ICD-10-CM

## 2025-03-11 PROCEDURE — 36573 INSJ PICC RS&I 5 YR+: CPT

## 2025-03-11 PROCEDURE — 87070 CULTURE OTHR SPECIMN AEROBIC: CPT | Performed by: NURSE PRACTITIONER

## 2025-03-11 PROCEDURE — 36590 REMOVAL TUNNELED CV CATH: CPT

## 2025-03-11 PROCEDURE — 250N000011 HC RX IP 250 OP 636: Performed by: NURSE PRACTITIONER

## 2025-03-11 RX ORDER — FENTANYL CITRATE 50 UG/ML
25-50 INJECTION, SOLUTION INTRAMUSCULAR; INTRAVENOUS EVERY 5 MIN PRN
Status: DISCONTINUED | OUTPATIENT
Start: 2025-03-11 | End: 2025-03-12 | Stop reason: HOSPADM

## 2025-03-11 RX ORDER — NALOXONE HYDROCHLORIDE 0.4 MG/ML
0.2 INJECTION, SOLUTION INTRAMUSCULAR; INTRAVENOUS; SUBCUTANEOUS
Status: DISCONTINUED | OUTPATIENT
Start: 2025-03-11 | End: 2025-03-12 | Stop reason: HOSPADM

## 2025-03-11 RX ORDER — SULFAMETHOXAZOLE AND TRIMETHOPRIM 800; 160 MG/1; MG/1
1 TABLET ORAL 2 TIMES DAILY
Qty: 14 TABLET | Refills: 0 | Status: SHIPPED | OUTPATIENT
Start: 2025-03-11 | End: 2025-03-18

## 2025-03-11 RX ORDER — FLUMAZENIL 0.1 MG/ML
0.2 INJECTION, SOLUTION INTRAVENOUS
Status: DISCONTINUED | OUTPATIENT
Start: 2025-03-11 | End: 2025-03-12 | Stop reason: HOSPADM

## 2025-03-11 RX ORDER — NALOXONE HYDROCHLORIDE 0.4 MG/ML
0.4 INJECTION, SOLUTION INTRAMUSCULAR; INTRAVENOUS; SUBCUTANEOUS
Status: DISCONTINUED | OUTPATIENT
Start: 2025-03-11 | End: 2025-03-12 | Stop reason: HOSPADM

## 2025-03-11 RX ORDER — HEPARIN SODIUM 200 [USP'U]/100ML
1 INJECTION, SOLUTION INTRAVENOUS EVERY 5 MIN PRN
Status: DISCONTINUED | OUTPATIENT
Start: 2025-03-11 | End: 2025-03-12 | Stop reason: HOSPADM

## 2025-03-11 RX ORDER — ONDANSETRON 2 MG/ML
4 INJECTION INTRAMUSCULAR; INTRAVENOUS
Status: DISCONTINUED | OUTPATIENT
Start: 2025-03-11 | End: 2025-03-12 | Stop reason: HOSPADM

## 2025-03-11 RX ORDER — LIDOCAINE 40 MG/G
CREAM TOPICAL
Status: DISCONTINUED | OUTPATIENT
Start: 2025-03-11 | End: 2025-03-12 | Stop reason: HOSPADM

## 2025-03-11 RX ORDER — CEFAZOLIN SODIUM/WATER 2 G/20 ML
2 SYRINGE (ML) INTRAVENOUS
Status: DISCONTINUED | OUTPATIENT
Start: 2025-03-11 | End: 2025-03-12 | Stop reason: HOSPADM

## 2025-03-11 RX ADMIN — MIDAZOLAM HYDROCHLORIDE 0.5 MG: 1 INJECTION, SOLUTION INTRAMUSCULAR; INTRAVENOUS at 13:59

## 2025-03-11 RX ADMIN — FENTANYL CITRATE 50 MCG: 50 INJECTION, SOLUTION INTRAMUSCULAR; INTRAVENOUS at 13:57

## 2025-03-11 RX ADMIN — MIDAZOLAM HYDROCHLORIDE 1 MG: 1 INJECTION, SOLUTION INTRAMUSCULAR; INTRAVENOUS at 13:51

## 2025-03-11 RX ADMIN — MIDAZOLAM HYDROCHLORIDE 0.5 MG: 1 INJECTION, SOLUTION INTRAMUSCULAR; INTRAVENOUS at 13:55

## 2025-03-11 RX ADMIN — FENTANYL CITRATE 50 MCG: 50 INJECTION, SOLUTION INTRAMUSCULAR; INTRAVENOUS at 13:52

## 2025-03-11 NOTE — PROGRESS NOTES
Interventional Radiology Brief Post Procedure Note    Procedure: IR PORT REMOVAL LEFT      Time Out: Prior to the start of the procedure and with procedural staff participation, I verbally confirmed the patient s identity using two indicators, relevant allergies, that the procedure was appropriate and matched the consent or emergent situation, and that the correct equipment/implants were available. Immediately prior to starting the procedure I conducted the Time Out with the procedural staff and re-confirmed the patient s name, procedure, and site/side. (The [facility] [place] universal protocol was followed.)  Yes    Medications   Medication Event Details Admin User Admin Time       Sedation: IR Nurse Monitored Care   Post Procedure Summary:  Prior to the start of the procedure and with procedural staff participation, I verbally confirmed the patient s identity using two indicators, relevant allergies, that the procedure was appropriate and matched the consent or emergent situation, and that the correct equipment/implants were available. Immediately prior to starting the procedure I conducted the Time Out with the procedural staff and re-confirmed the patient s name, procedure, and site/side. (The [employer] universal protocol was followed.)  Yes       Sedatives: Fentanyl and Midazolam (Versed)    Vital signs, airway and pulse oximetry were monitored and remained stable throughout the procedure and sedation was maintained until the procedure was complete.  The patient was monitored by staff until sedation discharge criteria were met.    Patient tolerance: Patient tolerated the procedure well with no immediate complications.    Time of sedation in minutes: 15 Minutes minutes from beginning to end of physician one to one monitoring.    Findings: exposed port    Estimated Blood Loss: Minimal    Fluoroscopy Time:  minute(s)    SPECIMENS:  tip to lab    Complications: 1. None     Condition: Stable    Plan: port  packed.  ABX rx.  Picc for use    Comments: See dictated procedure note for full details.    Angelo Thrasher MD

## 2025-03-11 NOTE — TELEPHONE ENCOUNTER
Vascular Referral Intake    Appointment note (to be pasted into appt note. Also add where additional info is located ie: outside images pushed to PACS, in Epic, sent to HIM, etc):   Referred by   Marisol Aquino APRN [facility] RADIOLOGY    for  Infected venous access port, open wound s/p removal of port 3/11/25    Specialty: Wound Clinic    Specific Provider if Necessary:  Rox Ortega NP, Karime Crooks NP, or Dr. Luc Mai    Visit Type: New    Time Frame: Next Available    Testing/Imaging Needed Before Consult: none    Elizabet or bed needed: No    *Schedulers: Please send welcome letter to patient after appointment(s) scheduled*

## 2025-03-11 NOTE — PRE-PROCEDURE
GENERAL PRE-PROCEDURE:   Procedure:  Port removal / PICC placement  Date/Time:  3/11/2025 1:23 PM    Written consent obtained?: Yes    Risks and benefits: Risks, benefits and alternatives were discussed    Consent given by:  Patient  Patient states understanding of procedure being performed: Yes    Patient's understanding of procedure matches consent: Yes    Procedure consent matches procedure scheduled: Yes    Expected level of sedation:  Moderate  Appropriately NPO:  Yes  ASA Class:  3  Mallampati  :  Grade 2- soft palate, base of uvula, tonsillar pillars, and portion of posterior pharyngeal wall visible  Lungs:  Lungs clear with good breath sounds bilaterally  Heart:  Normal heart sounds and rate  History & Physical reviewed:  History and physical reviewed and no updates needed  Statement of review:  I have reviewed the lab findings, diagnostic data, medications, and the plan for sedation

## 2025-03-11 NOTE — DISCHARGE INSTRUCTIONS
Port Removal Discharge Instructions:  You had your port-a-catheter removed today. Please follow the below instructions following your port removal:    Care Instructions:  - Avoid tub baths, Jacuzzi and pool soaks for 10 days.  - You may shower beginning tomorrow. Do not scrub site until well healed; pat dry.  - If you experience significant bleeding at site, apply pressure with hands above the clavicle bone, sit upright.    Seek medical assistance for any of the following:   - Uncontrolled bleeding.  - You have a fever (greater than 101 F (38.3C)).  - Purulent (yellow/green/foul smelling) drainage from previous catheter insertion site.  - Increasing redness at previous catheter insertion site.  - Increasing pain at previous catheter insertion site.  - Increasing swelling at previous catheter insertion site.    Contact [facility] IR RN Line at [phone] with questions or concerns.

## 2025-03-11 NOTE — IR NOTE
Interventional Radiology Intra-procedural Nursing Note  Patient Name: Raj Kim  Medical Record Number: [id number]  Today's Date: March 11, 2025    Procedure: port removal, picc line placement on the left arm  Start time: 1400  End time: 1407    Administered medication totals:  Versed 2 mg IVP  Fentanyl 100 mcg IVP        AVS Discharge Instructions reviewed with and provided to patient and/or responsible adult. All questions and concerns addressed and understanding was verbalized.

## 2025-03-11 NOTE — TELEPHONE ENCOUNTER
Pt at [facility] for procedure this afternoon- try to call tomorrow to schedule wound consult (not new, sees Dr. Blanc).

## 2025-03-12 ENCOUNTER — MYC MEDICAL ADVICE (OUTPATIENT)
Dept: INTERVENTIONAL RADIOLOGY/VASCULAR | Facility: CLINIC | Age: 75
End: 2025-03-12
Payer: COMMERCIAL

## 2025-03-12 ENCOUNTER — TELEPHONE (OUTPATIENT)
Dept: INTERVENTIONAL RADIOLOGY/VASCULAR | Facility: HOSPITAL | Age: 75
End: 2025-03-12
Payer: COMMERCIAL

## 2025-03-12 NOTE — TELEPHONE ENCOUNTER
Spoke to Patient, She is going to talk to referring provide before scheduling. She will call us back 3/13 after her appointment. [phone]

## 2025-03-12 NOTE — TELEPHONE ENCOUNTER
Detailed Voicemail message left with request for patient to contact Interventional Radiology Department and acknowledge understanding of MYCHART instructions that were sent in anticipation of procedure scheduled 3/13/25.      IR contact number provided in [facility].      Lyla GUEVARA  Interventional Radiology RN

## 2025-03-13 ENCOUNTER — HOSPITAL ENCOUNTER (OUTPATIENT)
Dept: INTERVENTIONAL RADIOLOGY/VASCULAR | Facility: HOSPITAL | Age: 75
Discharge: HOME | End: 2025-03-13
Attending: NURSE PRACTITIONER
Payer: COMMERCIAL

## 2025-03-13 DIAGNOSIS — T80.219A: ICD-10-CM

## 2025-03-13 DIAGNOSIS — T80.212D PORT OR RESERVOIR INFECTION, SUBSEQUENT ENCOUNTER: Primary | ICD-10-CM

## 2025-03-13 LAB
BACTERIA SPEC CULT: NO GROWTH
GRAM STAIN RESULT: ABNORMAL

## 2025-03-13 NOTE — PROGRESS NOTES
Interventional Radiology - Brief Visit  3/13/2025      Brief HPI: Raj is here for a port site check and dressing change. In brief, LEFT port placed 01/06/2025. She then noted skin breakdown at her incision site. LEFT port then removed 03/11/2025 and packed with hydrofera blue. PICC also placed this same day. Port tip culture sent, negative, per below. Patient was sent home on bactrim x1 week. Wound consult placed and order placed for patient to return approximately 2 weeks from 03/11 for new port placement and PICC removal.    Returns today for wound repacking and dressing change. Port site CDI, no erythema, drainage, or swelling noted. Image per below.    Culture No Growth            Gram Stain Result  Invalid Result (Invalid)  Stain not performed; inappropriate specimen type               IMAGING:  [place]     EXAM: Port Removal     LOCATION: [facility]     CLINICAL HISTORY: The patient has erosion at the incision of a left jugular port and requires removal. Intention is for port to be packed and close by secondary intention to limit risks of infection.     PROCEDURES PERFORMED: standart port removal technique     MODERATE SEDATION: Versed and Fentanyl were administered intravenously for moderate sedation. Pulse oximetry, heart rate and blood pressure were continuously monitored by an independent trained observer. The physician spent 11 minutes of face-to-face   moderate sedation time with the patient.     ADDITIONAL MEDICATIONS: see EMR      CONTRAST: None     FLUOROSCOPIC TIME: None   CUMULATIVE AIR KERMA/DOSE:  None      STERILE BARRIER TECHNIQUE: Maximal Sterile Barrier Technique Utilized: Cap AND mask AND sterile gown AND sterile gloves AND sterile full body drape AND hand hygiene AND skin preparation 2% chlorhexidine for cutaneous antisepsis (or acceptable alternative   antiseptics).   Sterile Ultrasound Technique Utilized ?Sterile gel AND sterile probe covers.     UNIVERSAL  PROTOCOL: Standard universal protocol per facility guidelines was followed. See EMR for documentation.      TECHNIQUE: Risks, benefits and alternatives were explained to the patient in detail.  All questions were answered.  Written, informed consent was given to proceed with the procedure.   The patient was placed in the supine position on the angiography   table. The chest was prepped and draped in the usual sterile fashion. One percent lidocaine was used for local anesthesia.  A small skin incision was made over the chest wall port, extending the small erosion at the incision site. The port and the catheter were dissected free from the surrounding soft tissues and removed in their entirety. Hemostasis was achieved with manual   compression.  The port was then removed from the pocket. The tip was sent to laboratory for culture. The incision was left open for closure by secondary intention.     FINDINGS:  The port has been removed in its entirety. . The port pocket is clean and dry, however due to the erosion is to be healed by secondary intention.                                                                      IMPRESSION:  Successful port removal. Tip culture. Patient will have packing replaced later this week. Oral antibiotics have been prescribed. Replacement after healing complete. 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      PLAN:  D/w Dr. Jimenez.    - Prior dressing removed and new hydrofera blue and mepilex dressing applied to site. Patient tolerated ok at bedside.   - Wound care previously consulted - appointment scheduled for next week WEDNESDAY 03/19.  - Continue to use hydrofera blue for packing until wound has closed. Once the opening can no longer accommodate packing, a small piece of hydrofera blue could be used overlying the wound until it has completely healed.  - Complete course of bactrim as ordered.  - Plan for new port placement and PICC removal in approximately 2 weeks - order  previously placed.    Patient was able to reach the Wound Clinic and has an appointment on WEDNESDAY 03/19. We will have her return to see us on MONDAY 03/17 for port repacking and dressing change - order placed.    Total time: 45 minutes    SHAKIRA LIU CNP  Interventional Radiology

## 2025-03-14 ENCOUNTER — TRANSFERRED RECORDS (OUTPATIENT)
Dept: HEALTH INFORMATION MANAGEMENT | Facility: CLINIC | Age: 75
End: 2025-03-14
Payer: COMMERCIAL

## 2025-03-17 ENCOUNTER — LAB (OUTPATIENT)
Dept: LAB | Facility: CLINIC | Age: 75
End: 2025-03-17
Payer: COMMERCIAL

## 2025-03-17 ENCOUNTER — HOSPITAL ENCOUNTER (OUTPATIENT)
Dept: INTERVENTIONAL RADIOLOGY/VASCULAR | Facility: HOSPITAL | Age: 75
Discharge: HOME | End: 2025-03-17
Payer: COMMERCIAL

## 2025-03-17 DIAGNOSIS — Z86.718 HISTORY OF DEEP VENOUS THROMBOSIS: ICD-10-CM

## 2025-03-17 DIAGNOSIS — T80.212D PORT OR RESERVOIR INFECTION, SUBSEQUENT ENCOUNTER: ICD-10-CM

## 2025-03-17 LAB — D DIMER PPP FEU-MCNC: 2 UG/ML FEU (ref 0–0.5)

## 2025-03-17 PROCEDURE — 85379 FIBRIN DEGRADATION QUANT: CPT

## 2025-03-17 PROCEDURE — 36415 COLL VENOUS BLD VENIPUNCTURE: CPT

## 2025-03-17 NOTE — PROGRESS NOTES
Interventional Radiology - Progress Note  Outpatient - [facility]  03/17/2025     S:  Resting comfortably. Denies pain, fever, chills, drainage from site. Remains on antibiotic.     O:  LMP 10/19/1980 (Approximate)   General: Stable. In no acute distress.    Neuro: Alert and oriented x 3. No focal deficits.  Psych: Appropriate mood and affect. Linear/coherent thought process.   Resp: Normal respirations. On RA.   Cardio: well perfused.  Skin:  normal, warm, dry; no erythema, ecchymosis, lesion, or drainage around left chest previous port insertion site.       LABS:  No lab results found in last 7 days.    Culture:  no growth  Antibiotic: bactrim prescribed for 1 week; has a few more days left.       A:  74 year old female LEFT port placed 01/06/2025. She then noted skin breakdown at her incision site. LEFT port then removed 03/11/2025 and packed with hydrofera blue. PICC also placed this same day. Port tip culture sent, negative, per below. Patient was sent home on bactrim x1 week. Wound consult previously placed and order placed for patient to return approximately 2 weeks from 03/11 for new port placement (recommend slim port as patient is prone to skin breakdown over the site) and PICC removal. Last dressing changed 3/13/25.      Returns today for wound repacking and dressing change. Port site CDI, no erythema, drainage, or swelling noted. No change in imaging.     P:    - Prior dressing removed and new hydrofera blue and mepilex dressing applied to site. Patient tolerated ok at bedside.   - Wound care previously consulted - appointment scheduled for WEDNESDAY 03/19.  - Continue to use hydrofera blue for packing until wound has closed. Once the opening can no longer accommodate packing, a small piece of hydrofera blue could be used overlying the wound until it has completely healed.  - Complete course of bactrim as ordered.  - Plan for new (slim) port placement and PICC removal in  approximately 2 weeks - order previously placed.  - Instructed to reach back out with further questions/concerns.          Total time spent on the date of the encounter: 35 minutes.    SHAKIRA Ladd CNP  Interventional Radiology  [phone]

## 2025-03-19 ENCOUNTER — TELEPHONE (OUTPATIENT)
Dept: VASCULAR SURGERY | Facility: CLINIC | Age: 75
End: 2025-03-19

## 2025-03-19 ENCOUNTER — HOSPITAL ENCOUNTER (OUTPATIENT)
Dept: WOUND CARE | Facility: CLINIC | Age: 75
Discharge: HOME | End: 2025-03-19
Attending: NURSE PRACTITIONER | Admitting: NURSE PRACTITIONER
Payer: COMMERCIAL

## 2025-03-19 VITALS
DIASTOLIC BLOOD PRESSURE: 69 MMHG | OXYGEN SATURATION: 97 % | SYSTOLIC BLOOD PRESSURE: 118 MMHG | HEART RATE: 94 BPM | RESPIRATION RATE: 16 BRPM

## 2025-03-19 DIAGNOSIS — S21.102A OPEN WOUND OF LEFT CHEST WALL, INITIAL ENCOUNTER: Primary | ICD-10-CM

## 2025-03-19 DIAGNOSIS — T80.219A INFECTED VENOUS ACCESS PORT: ICD-10-CM

## 2025-03-19 PROCEDURE — 11042 DBRDMT SUBQ TIS 1ST 20SQCM/<: CPT

## 2025-03-19 NOTE — ADDENDUM NOTE
Encounter addended by: Tess Jimenez RN on: 3/19/2025 11:43 AM   Actions taken: Order list changed, Letter saved

## 2025-03-19 NOTE — PATIENT INSTRUCTIONS
"Wound care supplies were ordered today through [employer] and if you are not receiving your supplies or have a question on your bill please contact Arley Wattsper [phone]. Please allow 2-5 business days for delivery of supplies. You may get a call from a 1-800 # if there are additional information Guido needs. It is important to  or return their call. PLEASE NOTE: If you need to return your supplies, you MUST call customer service within 15 days of delivery date.         Wound Care Instructions    3 TIMES PER WEEK and as needed, Cleanse your L) chest wound(s) with vashe.    Pat Dry with non-sterile gauze    Primary Dressing: Pack with 2\"  Biatain fiber AG into/onto the wounds. Use only one piece and ensure to leave the tail outside    Secondary dressing: Cover with foam dressing    It is not ok to get your wound wet in the bath or shower      If for some reason you are not able to get your dressing(s) changed as outlined above (due to illness, lack of supplies, lack of help) please do the following: remove old, soiled dressings; wash the wounds with saline; pat dry; apply ABD pad or other absorbant pad and secure with rolled gauze; avoid tape directly on your skin; Call the clinic as soon as possible to let us know what the current issues are in receiving wound care [phone].      SEEK MEDICAL CARE IF:  You have an increase in swelling, pain, or redness around the wound.  You have an increase in the amount of pus coming from the wound.  There is a bad smell coming from the wound.  The wound appears to be worsening/enlarging  You have a fever greater than 101.5 F      It is ok to continue current wound care treatment/products for the next 2-3 days until new wound care supplies are ordered and arrive. If longer than this please contact our office at [phone].      Please NOTE: if you are 15 minutes late to your arrival time, you will have to be rescheduled. Please call our clinic as soon as possible if " you know you will not be able to get to your appointment at [phone]. If you fail to show up to 3 scheduled clinic appointments you will be dismissed from our clinic.    High Protein Foods  Chicken  -Chicken breast, 3.5oz.-30 grams protein  -Chicken thigh-10 grams(average size)  -Drumstick-11 grams  -Wing- 6 grams  -Chicken meat, cooked, 4 oz.  Beef  -Hamburger yen, 4 oz-28 grams protein  -Steak, 6 oz-42 grams  -Most cuts of beef- 7 grams of protein per ounce  Fish  -Most fish fillets or steaks are about 22 grams of protein for 3 1/2 oz(100 grams) of cooked fish, or 6 grams per ounce  -Tuna, 6 oz can-40 grams of protein  Pork  -Pork chop, average-22 grams protein  -Pork loin or tenderloin, 4 oz.-29 grams  -Ham, 3oz serving- 19 grams  -Ground pork 3oz cooked-22 grams  -Wolfe, 1 slice-3 grams  -[place]-style wolfe(black wolfe), slice-5-6 grams  Eggs and Dairy  -Egg, large-7 grams  -Milk, 1 cup-8 grams  -Cottage cheese, 1/2 cup-15 grams  -Greek yogurt, 1 cup-usually 8-12 grams, check label  -Soft cheeses (Mozzarella, Brie, Camembert)- 6 grams  -Medium cheeses(cheddar, swiss)- 7 or 8 grams per oz  -Hard cheeses(parmesan)- 10 grams per oz  Beans  -Tofu, 1/2 cup 20 grams  -Tofu, 1 oz., 2.3 grams  -Soy milk, 1 cup-6-10 grams  -Most beans(black, win, lentils, etc.) about 7-10 grams protein per half cup of cooked beans  -soy beans, 1/2 cup cooked-14 grams  -Split peas, 1/2 cup cooked- 8 grams  Nuts and Seeds  -Peanut butter, 2 Tablespoons- 8 grams protein  -Almonds, 1/4 cup- 8 grams  -Peanuts, 1/4 cup-9 grams  -Cashews, 1/4 cup- 5 grams  -Pecans, 1/4 cup- 2.5 grams  -Sunflower seeds, 1/4 cup- 6 grams  -Pumpkin seeds, 1/4 cup-8 grams  -Flax seeds- 1/4 cup- 8 grams  Protein Supplements  -Ensure  -Boost  -Glucerna, if diabetic  When you have an open ulcer, your bodies protein needs are much higher, so it is recommended eat good sources of protein      We want to hear from you!  In the next few weeks, you should receive  a call or email to complete a survey about your visit at [facility]. Please help us improve your appointment experience by letting us know how we did today. We strive to make your experience good and value any ways in which we could do better.      We value your input and suggestions.    Thank you for choosing the [facility]!

## 2025-03-19 NOTE — LETTER
[facility]  [address] [place] [address] [address]  [place], [place] [address]  [phone]      Fax [phone]    [facility]           FAX: [phone]            Customer Service: [phone]        Account #: [id number]    Wound Dressing Rx and Order Form  Order Status: New  Verbal: SAMANTHA Pulido   Date: 2025     Raj Kim  Gender: female  : 1950  [address] [place] [address]  [phone] (home)     Medical Record: [id number]  Primary Care Provider: Lorenzo Dunn      ICD-10-CM    1. Open wound of left chest wall, initial encounter  S21.102A DEBRIDE SKIN/SUBQ TISSUE     OFFICE/OUTPT VISIT,[facility]     Home Care Referral      2. Infected venous access port  T80.219A Wound Care Referral     Wound Care Referral            Insurance Info:  INSURER: Payor: [employer] / Plan: [employer] MEDICARE ADVANTAGE / Product Type: Medicare /   Policy ID#:  [id number]  SECONDARY INSURANCE:    Secondary Policy ID#:  N/A        Physician Info:   Name:  ISAIAH PEACE     Dept Address/Phones:   [address] [place] [address], [address]  [facility] [address] [phone]  Fax: [phone]    Lymphedema circumferential measurements (in cm):       No data to display                  Wound info:  PICC 25 Single Lumen Left Brachial vein medial temp line for chemo while port was removed (Active)   Number of days: 8       VASC Wound Left chest (Active)   Pre Size Length 0.5 25 0900   Pre Size Width 1.5 25 0900   Pre Size Depth 0.4 25 0900   Pre Total Sq cm 0.75 25 0900   Tunneling 2.2cm @ 12 o clock 25 0900   Number of days: 0        Drainage: moderate  Thickness:  full  Duration of Need: 30 DAYS  Days Supply: 30 DAYS  Start Date: 3/19/2025  Starter Kit, Ancillary Kit (saline, gloves, gauze): Yes   Qualifying wound/Debridement: Yes     DISPENSE AS WRITTEN.   Call [phone]. Please call patient for out-of-pocket costs and options.      Dressing Type  "Brand Size Frequency of change  Quantity   Primary Mepilex bordered foam adhesive   3\" x 3\" 3 TIMES PER WEEK and as needed      DISPENSE AS WRITTEN. Call [phone] with questions.     Wound Care Instructions     3 TIMES PER WEEK and as needed, Cleanse your L) chest wound(s) with vashe.     Pat Dry with non-sterile gauze     Primary Dressing: Pack with 2\"  Biatain fiber AG into/onto the wounds. Use only one piece and ensure to leave the tail outside     Secondary dressing: Cover with foam dressing           OK to forward to covered supplier.    Electronically Signed Physician:  ISAIAH PEACE             Date: March 19, 2025    "

## 2025-03-19 NOTE — TELEPHONE ENCOUNTER
Home Care was ordered for this patient.     Please assist with finding home care if [facility] is unable to accept.     Pt is immunocompromised and is an infection risk, and does not have a teachable party at this time.

## 2025-03-20 ENCOUNTER — HOSPITAL ENCOUNTER (OUTPATIENT)
Dept: PET IMAGING | Facility: HOSPITAL | Age: 75
Discharge: HOME | End: 2025-03-20
Attending: INTERNAL MEDICINE
Payer: COMMERCIAL

## 2025-03-20 DIAGNOSIS — C25.0 PRIMARY CANCER OF HEAD OF PANCREAS (H): ICD-10-CM

## 2025-03-20 LAB — GLUCOSE BLDC GLUCOMTR-MCNC: 84 MG/DL (ref 70–99)

## 2025-03-20 PROCEDURE — 343N000001 HC RX 343 MED OP 636: Performed by: INTERNAL MEDICINE

## 2025-03-20 PROCEDURE — 78815 PET IMAGE W/CT SKULL-THIGH: CPT | Mod: PI

## 2025-03-20 PROCEDURE — A9552 F18 FDG: HCPCS | Performed by: INTERNAL MEDICINE

## 2025-03-20 RX ORDER — FLUDEOXYGLUCOSE F 18 200 MCI/ML
7-18 INJECTION, SOLUTION INTRAVENOUS ONCE
Status: COMPLETED | OUTPATIENT
Start: 2025-03-20 | End: 2025-03-20

## 2025-03-20 RX ADMIN — FLUDEOXYGLUCOSE F 18 10.31 MILLICURIE: 200 INJECTION, SOLUTION INTRAVENOUS at 11:36

## 2025-03-24 ENCOUNTER — MYC MEDICAL ADVICE (OUTPATIENT)
Dept: VASCULAR SURGERY | Facility: CLINIC | Age: 75
End: 2025-03-24
Payer: COMMERCIAL

## 2025-03-26 ENCOUNTER — HOSPITAL ENCOUNTER (OUTPATIENT)
Dept: WOUND CARE | Facility: CLINIC | Age: 75
Discharge: HOME | End: 2025-03-26
Payer: COMMERCIAL

## 2025-03-26 VITALS
HEART RATE: 88 BPM | SYSTOLIC BLOOD PRESSURE: 133 MMHG | DIASTOLIC BLOOD PRESSURE: 75 MMHG | RESPIRATION RATE: 18 BRPM | OXYGEN SATURATION: 100 %

## 2025-03-26 DIAGNOSIS — S21.102A OPEN WOUND OF LEFT CHEST WALL, INITIAL ENCOUNTER: ICD-10-CM

## 2025-03-26 DIAGNOSIS — T80.219D INFECTION OF VENOUS ACCESS PORT, SUBSEQUENT ENCOUNTER: Primary | ICD-10-CM

## 2025-03-26 PROCEDURE — 11042 DBRDMT SUBQ TIS 1ST 20SQCM/<: CPT

## 2025-03-26 NOTE — PROGRESS NOTES
Wound Clinic Note          Visit date: 03/26/2025       Cheif Complaint:     Raj Kim is a 74 year old  female had concerns including WOUND CARE.        Treatment Plan:    1. Treatment: L) chest wound treatment will include irrigation and dressings to promote autolytic debridement which will include: vashe, Biatain fiber AG, and foam dressing. Changed by home care 3 times a week.    2. Offloading: no direct pressure over the wound.    3. Nutrition: Continue with high Protein diet unless on renal diet. Consider protein supplements.     4. Wound Etiology: Surgical    6.  Education: importance of covering the wound.       I have explained to the patient the importance of protein intake to wound healing.  I have explained that increasing protein intake will speed wound healing.  We discussed several types of food that are high in protein and the wound care nurse gave the patient a handout that summarizes this information.  In addition to further speed wound healing I have encouraged the patient to take a protein supplement.       Patient to return to clinic in 2 week(s) for re-evaluation. They were instructed to call the clinic sooner with any further questions or concerns. Answered all questions.       HISTORY OF PRESENT ILLNESS:    Raj returns at [facility] today regarding L) side port removal site. They arrive to the clinic today alone. The patient reports that the ulcer has been present since 3/11/25.  The wound began as surgical   Is currently using Biatain fiber AG and foam dressing. There has been Light serous  drainage from the wound. This was being changed at IR. Reports pain of 0/10;  and has remained about the same recently. Currently using nothing for pain. Denies any fevers, chills, or generalized ill feeling. History of Primary adenocarcinoma of ampulla of Vater. Going through chemotherapy.     Today the patient reports maintaining a high protein diet, but has not been taking  protein supplements lately.        The patient denies a history of smoking or chronic steroid use.         The patient has recently had some symptoms of wound infection and is currently taking antibiotics which they have been tolerating well with no symptoms of an adverse reaction.       Previous imagings: NA    Problem List:   Past Medical History:   Diagnosis Date    Acute deep vein thrombosis (DVT) of axillary vein (H) 12/22/2021    Chronic anemia     normochromic, normocytic    Dilation of pancreatic duct     Fecal incontinence 07/27/2020    Hx of radiation therapy     Hydronephrosis with urinary obstruction due to ureteral calculus     Lyme disease 07/24/2016    Osteoporosis     Ovarian cancer (H) 01/01/1980    Peripheral neuropathy, secondary to drugs or chemicals     Primary adenocarcinoma of ampulla of Vater (H)     Radiation colitis     Sensorineural hearing loss, bilateral 05/02/2019    Thrombophlebitis of superficial veins of left lower extremity 03/04/2025    Vitamin B 12 deficiency     Weight loss 07/19/2023              Family Hx: family history includes Arthritis in her mother; Bladder Cancer in her sister; Breast Cancer in her maternal aunt and maternal grandmother; Colon Cancer in her maternal aunt and another family member; Dementia in her mother; Diabetes in her brother and father; Diabetes Type 2  in her brother; No Known Problems in her sister; Other Cancer in her sister.       Surgical Hx:   Past Surgical History:   Procedure Laterality Date    FINGER SURGERY Bilateral 01/01/2016    Dr. [facility]    FOOT TENDON SURGERY Right     HYSTERECTOMY  01/01/1980    IR CHEST PORT PLACEMENT > 5 YRS OF AGE  1/6/2025    IR MISCELLANEOUS PROCEDURE  3/17/2025    IR PICC PLACEMENT > 5 YRS OF AGE  3/11/2025    IR PORT REMOVAL LEFT  3/11/2025    LAPAROSCOPIC CHOLECYSTECTOMY  2008    LAPAROTOMY EXPLORATORY  1980    ovary/omentum abnormalities    OOPHORECTOMY Bilateral 01/01/1980          Allergies:     Allergies   Allergen Reactions    Cephalosporins Hives and Other (See Comments)     Diarrhea    Penicillins Hives and Rash    Celecoxib Headache and Other (See Comments)    Hydrocodone-Acetaminophen Headache    Hydrocodone-Acetaminophen Headache    Oxycodone Other (See Comments)    Hydrocodone Diarrhea    Nabumetone Diarrhea              Medication History:    Current Outpatient Medications   Medication Sig Dispense Refill    betamethasone dipropionate (DIPROLENE) 0.05 % ointment Apply topically as needed      capecitabine (XELODA) 500 MG tablet Take 1,000 mg by mouth 2 times daily.      CREON 77580-64446 units CPEP per EC capsule Take 3 capsules by mouth 3 times daily (with meals). 1 capsule with snacks      desonide (DESOWEN) 0.05 % cream Apply topically as needed      gabapentin (NEURONTIN) 100 MG capsule Take 2 capsules by mouth every 24 hours      Lactobacillus rhamnosus GG (CULTURELLE) 10-15 Billion cell capsule [LACTOBACILLUS RHAMNOSUS GG (CULTURELLE) 10-15 BILLION CELL CAPSULE] Take 1 capsule by mouth daily.      lidocaine-prilocaine (EMLA) 2.5-2.5 % external cream Apply 1 Application topically as needed for moderate pain.      Magnesium 400 MG TABS Take by mouth daily      methocarbamol (ROBAXIN) 750 MG tablet Take 500 mg by mouth daily as needed for muscle spasms.      OLANZapine (ZYPREXA) 5 MG tablet Take 5 mg by mouth at bedtime.      omeprazole (PRILOSEC) 40 MG DR capsule Take 40 mg by mouth daily.      ondansetron (ZOFRAN) 8 MG tablet Take 4 mg by mouth every 8 hours as needed for nausea.      prochlorperazine (COMPAZINE) 10 MG tablet Take 10 mg by mouth every 6 hours as needed for nausea.      psyllium with dextrose (METAMUCIL JAR) powder 1 teaspoon daily      valACYclovir (VALTREX) 500 MG tablet Take 500 mg by mouth as needed       No current facility-administered medications for this encounter.         Tobacco History:  reports that she quit smoking about 38 years ago. Her smoking use included  cigarettes. She started smoking about 48 years ago. She has a 5 pack-year smoking history. She has never used smokeless tobacco.       REVIEW OF SYMPTOMS:   The review of systems was negative except as noted in the HPI.           PHYSICAL EXAMINATION:     /75   Pulse 88   Resp 18   LMP 10/19/1980 (Approximate)   SpO2 100%            GENERAL: The patient overall appears well and is no acute distress.   HEAD: normocephalic   EYES: Sclera and conjunctiva clear   NECK: no obvious masses   LUNGS: breathing is unlabored.   EXTREMITIES: No clubbing, cyanosis, edema   SKIN: No rashes or other abnormalities except as noted under the Wound section below.   NEUROLOGICAL: normal motor and sensory function           Also see below for wound details:     Circumferential volume measures:             No data to display                  Impression:  @DXC@        Wound location: L) chest      3/19                                                                     3/26   Last photo: 3/26  Wound due to:  Port removal site  Wound history/plan of care: Port was removed on 3/11, eroded through skin.  Wound base: 100 %  pale non granular tissue , Full thickness,      Palpation of the wound bed: normal      Drainage: moderate     Description of drainage: serosanguinous     Measurements (length x width x depth, in cm): 0.5  x 1.5  x  0.2 cm      Tunneling: N/A     Undermining: up to 1 cm from 11-12 o'clock  Periwound skin: Intact and had 2 sutures, removed      Color: normal and consistent with surrounding tissue      Temperature: normal   Odor: none  Pain: mild, aching  Pain interventions prior to dressing change: patient tolerated well, soaking, and slow and gentle cares   STATUS: healing      Ulceration(s)/Wound(s):   Please see the media tab under the chart review for pictures of the wounds.  Nursing staff removed dressings and cleansed wound.    VASC Wound Left chest (Active)   Pre Size Length 0.5 03/26/25 1200   Pre Size  "Width 1.5 03/26/25 1200   Pre Size Depth 0.2 03/26/25 1200   Pre Total Sq cm 0.75 03/26/25 1200          No results for input(s): \"HGBA1C\", \"A1C\", \"EAG\" in the last 22743 hours.    Invalid input(s): \"[id number]\"       Recent Labs   Lab Test 06/27/24  0854 10/19/23  1420 07/19/23  0956   ALBUMIN 4.1 4.4 4.1         WBC Count   Date Value Ref Range Status   06/27/2024 9.1 4.0 - 11.0 10e3/uL Final     Albumin   Date Value Ref Range Status   06/27/2024 4.1 3.5 - 5.2 g/dL Final   05/26/2022 3.9 3.5 - 5.0 g/dL Final                      ASSESSMENT:   This is a 74 year old  female with L) chest open wound s/p port removal. Healing        Procedure:         1. Debridement: After discussion of risk factors and verbal consent was obtained 2% Lidocaine HCL jelly was applied, under clean conditions, the L) chest ulceration(s) were debrided using currette. Devitalized and nonviable tissue, along with any fibrin and slough, was removed to improve granulation tissue formation, stimulate wound healing, decrease overall bacteria load, disrupt biofilm formation and decrease edge senescence.  Total excisional debridement was 0.75 sq cm into the subcutaneous tissue with a depth of 0.2 cm.   Ulcers were improved afterwards and .  Measures were as noted on the flow sheet and unchanged after debridement.            SHAKIRA Chawla, FNP-C, CWOCN  03/26/2025   12:54 PM   [facility]  [phone]           "

## 2025-03-26 NOTE — PATIENT INSTRUCTIONS
"Wound care supplies were not ordered or needed today.        Wound Care Instructions    3 TIMES PER WEEK and as needed, Cleanse your L) chest wound(s) with vashe.    Pat Dry with non-sterile gauze    Primary Dressing: Pack with 2\"  Biatain fiber AG into/onto the wounds. Use only one piece and ensure to leave the tail outside    Secondary dressing: Cover with foam dressing    It is not ok to get your wound wet in the bath or shower      If for some reason you are not able to get your dressing(s) changed as outlined above (due to illness, lack of supplies, lack of help) please do the following: remove old, soiled dressings; wash the wounds with saline; pat dry; apply ABD pad or other absorbant pad and secure with rolled gauze; avoid tape directly on your skin; Call the clinic as soon as possible to let us know what the current issues are in receiving wound care [phone].      SEEK MEDICAL CARE IF:  You have an increase in swelling, pain, or redness around the wound.  You have an increase in the amount of pus coming from the wound.  There is a bad smell coming from the wound.  The wound appears to be worsening/enlarging  You have a fever greater than 101.5 F      It is ok to continue current wound care treatment/products for the next 2-3 days until new wound care supplies are ordered and arrive. If longer than this please contact our office at [phone].      Please NOTE: if you are 15 minutes late to your arrival time, you will have to be rescheduled. Please call our clinic as soon as possible if you know you will not be able to get to your appointment at [phone]. If you fail to show up to 3 scheduled clinic appointments you will be dismissed from our clinic.    High Protein Foods  Chicken  -Chicken breast, 3.5oz.-30 grams protein  -Chicken thigh-10 grams(average size)  -Drumstick-11 grams  -Wing- 6 grams  -Chicken meat, cooked, 4 oz.  Beef  -Hamburger yen, 4 oz-28 grams protein  -Steak, 6 oz-42 " grams  -Most cuts of beef- 7 grams of protein per ounce  Fish  -Most fish fillets or steaks are about 22 grams of protein for 3 1/2 oz(100 grams) of cooked fish, or 6 grams per ounce  -Tuna, 6 oz can-40 grams of protein  Pork  -Pork chop, average-22 grams protein  -Pork loin or tenderloin, 4 oz.-29 grams  -Ham, 3oz serving- 19 grams  -Ground pork 3oz cooked-22 grams  -Wolfe, 1 slice-3 grams  -[place]-style wolfe(black wolfe), slice-5-6 grams  Eggs and Dairy  -Egg, large-7 grams  -Milk, 1 cup-8 grams  -Cottage cheese, 1/2 cup-15 grams  -Greek yogurt, 1 cup-usually 8-12 grams, check label  -Soft cheeses (Mozzarella, Brie, Camembert)- 6 grams  -Medium cheeses(cheddar, swiss)- 7 or 8 grams per oz  -Hard cheeses(parmesan)- 10 grams per oz  Beans  -Tofu, 1/2 cup 20 grams  -Tofu, 1 oz., 2.3 grams  -Soy milk, 1 cup-6-10 grams  -Most beans(black, win, lentils, etc.) about 7-10 grams protein per half cup of cooked beans  -soy beans, 1/2 cup cooked-14 grams  -Split peas, 1/2 cup cooked- 8 grams  Nuts and Seeds  -Peanut butter, 2 Tablespoons- 8 grams protein  -Almonds, 1/4 cup- 8 grams  -Peanuts, 1/4 cup-9 grams  -Cashews, 1/4 cup- 5 grams  -Pecans, 1/4 cup- 2.5 grams  -Sunflower seeds, 1/4 cup- 6 grams  -Pumpkin seeds, 1/4 cup-8 grams  -Flax seeds- 1/4 cup- 8 grams  Protein Supplements  -Ensure  -Boost  -Glucerna, if diabetic  When you have an open ulcer, your bodies protein needs are much higher, so it is recommended eat good sources of protein      We want to hear from you!  In the next few weeks, you should receive a call or email to complete a survey about your visit at [facility]. Please help us improve your appointment experience by letting us know how we did today. We strive to make your experience good and value any ways in which we could do better.      We value your input and suggestions.    Thank you for choosing the [facility]!

## 2025-03-27 ENCOUNTER — DOCUMENTATION ONLY (OUTPATIENT)
Dept: VASCULAR SURGERY | Facility: CLINIC | Age: 75
End: 2025-03-27
Payer: COMMERCIAL

## 2025-03-27 NOTE — PROGRESS NOTES
Ms. Kim was diagnosed with pancreatic cancer since we last talked.  She has had Whipple's procedure and is on chemotherapy currently.  She follows up with [place] [facility].  She did develop superficial vein thrombosis and she is now back on apixaban.  She had contacted us given recent D-dimer result being high.  Recall that we were following serial D-dimer while she is off anticoagulation due to previous partially provoked right upper extremity DVT (refer to my previous consult note from 6/12/2024).    No more D-dimer testing will be pursued at this stage.  We do agree to continuing apixaban in the setting of superficial vein thrombosis and active chemotherapy treatment.  We will plan to check in with her in about 6 months and assess if any future follow-up with our clinic is needed.  I did communicate this with her through [employer] message.    Liat Blanc MD, [facility]  Vascular Medicine  March 27, 2025

## 2025-03-31 ENCOUNTER — NURSE TRIAGE (OUTPATIENT)
Dept: INTERNAL MEDICINE | Facility: CLINIC | Age: 75
End: 2025-03-31
Payer: COMMERCIAL

## 2025-03-31 NOTE — TELEPHONE ENCOUNTER
"Nurse Triage SBAR    Is this a 2nd Level Triage? YES, LICENSED PRACTITIONER REVIEW IS REQUIRED    Situation: Diarrhea    Background:  Diarrhea started one week ago. Had PO chemo on Friday.    Assessment:  Pt states she's been having diarrhea for one week. Has been taking generic OTC antidiarrheals with some relief. Has had up to 10 stools so far in the past 24 hours. Says it is water and tan/green. Denies any blood or mucous present. Denies signs of dehydration but does state she has a low hemoglobin and \"has symptoms related to that.\" Denies pain or vomiting. Says she was recently on Bactrim and is currently on a chemotherapy regimen.    Protocol Recommended Disposition:   Call ADS/Go to ED/UCC Now (Or To Office with PCP Approval)    Recommendation: Recommended pt goes to ADS for further evaluation. Pt declines and would like to continue to monitor for now. Encouraged to call back if symptoms worsen.    Routed to provider    Does the patient meet one of the following criteria for ADS visit consideration? 16+ years old, with an [facility] PCP     TIP  Providers, please consider if this condition is appropriate for management at one of our Acute and Diagnostic Services sites.     If patient is a good candidate, please use dotphrase <dot>triageresponse and select Refer to ADS to document.        Reason for Disposition   SEVERE diarrhea (e.g., 7 or more times / day more than normal) and age > 60 years    Additional Information   Negative: Shock suspected (e.g., cold/pale/clammy skin, too weak to stand, low BP, rapid pulse)   Negative: Difficult to awaken or acting confused (e.g., disoriented, slurred speech)   Negative: Sounds like a life-threatening emergency to the triager   Negative: Vomiting also present and worse than the diarrhea   Negative: Blood in stool and without diarrhea   Negative: Diarrhea begins while taking an antibiotic by mouth (oral antibiotic)   Negative: SEVERE abdominal pain (e.g., excruciating) and " "present > 1 hour   Negative: SEVERE abdominal pain and age > 60 years   Negative: Bloody, black, or tarry bowel movements  (Exception: Chronic-unchanged black-grey bowel movements and is taking iron pills or Pepto-Bismol.)    Answer Assessment - Initial Assessment Questions  1. DIARRHEA SEVERITY: \"How bad is the diarrhea?\" \"How many more stools have you had in the past 24 hours than normal?\"       10  2. ONSET: \"When did the diarrhea begin?\"       One week ago  3. STOOL DESCRIPTION:  \"How loose or watery is the diarrhea?\" \"What is the stool color?\" \"Is there any blood or mucous in the stool?\"      Watery, tan/green, denies blood or mucous  4. VOMITING: \"Are you also vomiting?\" If Yes, ask: \"How many times in the past 24 hours?\"       Denies  5. ABDOMEN PAIN: \"Are you having any abdomen pain?\" If Yes, ask: \"What does it feel like?\" (e.g., crampy, dull, intermittent, constant)       Denies  6. ABDOMEN PAIN SEVERITY: If present, ask: \"How bad is the pain?\"  (e.g., Scale 1-10; mild, moderate, or severe)        7. ORAL INTAKE: If vomiting, \"Have you been able to drink liquids?\" \"How much liquids have you had in the past 24 hours?\"      Yes  8. HYDRATION: \"Any signs of dehydration?\" (e.g., dry mouth [not just dry lips], too weak to stand, dizziness, new weight loss) \"When did you last urinate?\"      Denies  9. EXPOSURE: \"Have you traveled to a foreign country recently?\" \"Have you been exposed to anyone with diarrhea?\" \"Could you have eaten any food that was spoiled?\"      Denies  10. ANTIBIOTIC USE: \"Are you taking antibiotics now or have you taken antibiotics in the past 2 months?\"        Yes, states she was on Bactrim  11. OTHER SYMPTOMS: \"Do you have any other symptoms?\" (e.g., fever, blood in stool)        Denies  12. PREGNANCY: \"Is there any chance you are pregnant?\" \"When was your last menstrual period?\"        N/A    Protocols used: Diarrhea-A-OH    "

## 2025-03-31 NOTE — TELEPHONE ENCOUNTER
I would recommend that this person get evaluated with this lengthy diarrheal episode.  Recent antibiotics could cause C. difficile.  I do not know why she is on chemo but chemo can cause diarrhea as well.  Concern for dehydration etc.  Has she touched base with her oncologist?

## 2025-03-31 NOTE — TELEPHONE ENCOUNTER
"Pt returned call. Relayed response from provider. She declines recommendation for evaluation and states she will \"see how it goes\". She has appt with oncologist Friday this week. Writer encouraged her to contact them sooner than Friday to discuss. Discussed if symptoms worsen to go in for evaluation and IV fluids.   "

## 2025-04-04 ENCOUNTER — TRANSFERRED RECORDS (OUTPATIENT)
Dept: HEALTH INFORMATION MANAGEMENT | Facility: CLINIC | Age: 75
End: 2025-04-04
Payer: COMMERCIAL

## 2025-04-10 ENCOUNTER — OFFICE VISIT (OUTPATIENT)
Dept: VASCULAR SURGERY | Facility: CLINIC | Age: 75
End: 2025-04-10
Payer: COMMERCIAL

## 2025-04-10 VITALS — OXYGEN SATURATION: 99 % | HEART RATE: 81 BPM | SYSTOLIC BLOOD PRESSURE: 146 MMHG | DIASTOLIC BLOOD PRESSURE: 73 MMHG

## 2025-04-10 DIAGNOSIS — T80.219D INFECTED VENOUS ACCESS PORT, SUBSEQUENT ENCOUNTER: Primary | ICD-10-CM

## 2025-04-10 DIAGNOSIS — T14.8XXA OPEN WOUND: ICD-10-CM

## 2025-04-10 PROCEDURE — G0463 HOSPITAL OUTPT CLINIC VISIT: HCPCS

## 2025-04-10 RX ORDER — COLESTIPOL HYDROCHLORIDE 1 G/1
2 TABLET ORAL
COMMUNITY
Start: 2025-04-08

## 2025-04-10 RX ORDER — APIXABAN 2.5 MG/1
2.5 TABLET, FILM COATED ORAL
COMMUNITY
Start: 2025-03-14

## 2025-04-10 ASSESSMENT — PAIN SCALES - GENERAL: PAINLEVEL_OUTOF10: NO PAIN (0)

## 2025-04-10 NOTE — PATIENT INSTRUCTIONS
Wound care supplies were not ordered or needed today.        Wound Care Instructions    2 TIMES PER WEEK and as needed, Cleanse your L) chest wound(s) with vashe or normal saline    Pat Dry with non-sterile gauze    Primary Dressing: Cover with foam dressing    It is not ok to get your wound wet in the bath or shower      If for some reason you are not able to get your dressing(s) changed as outlined above (due to illness, lack of supplies, lack of help) please do the following: remove old, soiled dressings; wash the wounds with saline; pat dry; apply ABD pad or other absorbant pad and secure with rolled gauze; avoid tape directly on your skin; Call the clinic as soon as possible to let us know what the current issues are in receiving wound care [phone].      SEEK MEDICAL CARE IF:  You have an increase in swelling, pain, or redness around the wound.  You have an increase in the amount of pus coming from the wound.  There is a bad smell coming from the wound.  The wound appears to be worsening/enlarging  You have a fever greater than 101.5 F      It is ok to continue current wound care treatment/products for the next 2-3 days until new wound care supplies are ordered and arrive. If longer than this please contact our office at [phone].      High Protein Foods  Chicken  -Chicken breast, 3.5oz.-30 grams protein  -Chicken thigh-10 grams(average size)  -Drumstick-11 grams  -Wing- 6 grams  -Chicken meat, cooked, 4 oz.  Beef  -Hamburger yen, 4 oz-28 grams protein  -Steak, 6 oz-42 grams  -Most cuts of beef- 7 grams of protein per ounce  Fish  -Most fish fillets or steaks are about 22 grams of protein for 3 1/2 oz(100 grams) of cooked fish, or 6 grams per ounce  -Tuna, 6 oz can-40 grams of protein  Pork  -Pork chop, average-22 grams protein  -Pork loin or tenderloin, 4 oz.-29 grams  -Ham, 3oz serving- 19 grams  -Ground pork 3oz cooked-22 grams  -Wolfe, 1 slice-3 grams  -Maltese-style wolfe(black wolfe),  slice-5-6 grams  Eggs and Dairy  -Egg, large-7 grams  -Milk, 1 cup-8 grams  -Cottage cheese, 1/2 cup-15 grams  -Greek yogurt, 1 cup-usually 8-12 grams, check label  -Soft cheeses (Mozzarella, Brie, Camembert)- 6 grams  -Medium cheeses(cheddar, swiss)- 7 or 8 grams per oz  -Hard cheeses(parmesan)- 10 grams per oz  Beans  -Tofu, 1/2 cup 20 grams  -Tofu, 1 oz., 2.3 grams  -Soy milk, 1 cup-6-10 grams  -Most beans(black, win, lentils, etc.) about 7-10 grams protein per half cup of cooked beans  -soy beans, 1/2 cup cooked-14 grams  -Split peas, 1/2 cup cooked- 8 grams  Nuts and Seeds  -Peanut butter, 2 Tablespoons- 8 grams protein  -Almonds, 1/4 cup- 8 grams  -Peanuts, 1/4 cup-9 grams  -Cashews, 1/4 cup- 5 grams  -Pecans, 1/4 cup- 2.5 grams  -Sunflower seeds, 1/4 cup- 6 grams  -Pumpkin seeds, 1/4 cup-8 grams  -Flax seeds- 1/4 cup- 8 grams  Protein Supplements  -Ensure  -Boost  -Glucerna, if diabetic  When you have an open ulcer, your bodies protein needs are much higher, so it is recommended eat good sources of protein      Please NOTE: if you are 15 minutes late to your arrival time, you will have to be rescheduled. Please call our clinic as soon as possible if you know you will not be able to get to your appointment at [phone]. If you fail to show up to 3 scheduled clinic appointments you will be dismissed from our clinic.      We want to hear from you!  In the next few weeks, you should receive a call or email to complete a survey about your visit at [facility]. Please help us improve your appointment experience by letting us know how we did today. We strive to make your experience good and value any ways in which we could do better.      We value your input and suggestions.    Thank you for choosing the [facility]!

## 2025-04-10 NOTE — PROGRESS NOTES
Wound Clinic Note          Visit date: 04/10/2025       Cheif Complaint:     Raj Kim is a 74 year old  female had concerns including Wound Check.        Treatment Plan:    1. Treatment: L) chest wound treatment will include irrigation and dressings to promote autolytic debridement which will include: NS, and foam dressing. Changed by home care 2 times a week.    2. Offloading: no direct pressure over the wound.    3. Nutrition: Continue with high Protein diet unless on renal diet. Consider protein supplements.     4. Wound Etiology: Surgical    5.  Education: importance of covering the wound.       I have explained to the patient the importance of protein intake to wound healing.  I have explained that increasing protein intake will speed wound healing.  We discussed several types of food that are high in protein and the wound care nurse gave the patient a handout that summarizes this information.  In addition to further speed wound healing I have encouraged the patient to take a protein supplement.       Patient to return to clinic PRN for re-evaluation. They were instructed to call the clinic sooner with any further questions or concerns. Answered all questions.       HISTORY OF PRESENT ILLNESS:    Raj returns at [facility] today regarding L) side port removal site. They arrive to the clinic today alone. The patient reports that the ulcer has been present since 3/11/25.  The wound began as surgical   Is currently using Biatain fiber AG and foam dressing. There has been Light serous  drainage from the wound. This was being changed at IR. Reports pain of 0/10;  and has remained about the same recently. Currently using nothing for pain. Denies any fevers, chills, or generalized ill feeling. History of Primary adenocarcinoma of ampulla of Vater. Going through chemotherapy.     Today the patient reports maintaining a high protein diet, but has not been taking protein supplements lately.         The patient denies a history of smoking or chronic steroid use.         The patient has recently had some symptoms of wound infection and is currently taking antibiotics which they have been tolerating well with no symptoms of an adverse reaction.       Previous imagings: NA    Problem List:   Past Medical History:   Diagnosis Date    Acute deep vein thrombosis (DVT) of axillary vein (H) 12/22/2021    Chronic anemia     normochromic, normocytic    Dilation of pancreatic duct     Fecal incontinence 07/27/2020    Hx of radiation therapy     Hydronephrosis with urinary obstruction due to ureteral calculus     Lyme disease 07/24/2016    Osteoporosis     Ovarian cancer (H) 01/01/1980    Peripheral neuropathy, secondary to drugs or chemicals     Primary adenocarcinoma of ampulla of Vater (H)     Radiation colitis     Sensorineural hearing loss, bilateral 05/02/2019    Thrombophlebitis of superficial veins of left lower extremity 03/04/2025    Vitamin B 12 deficiency     Weight loss 07/19/2023              Family Hx: family history includes Arthritis in her mother; Bladder Cancer in her sister; Breast Cancer in her maternal aunt and maternal grandmother; Colon Cancer in her maternal aunt and another family member; Dementia in her mother; Diabetes in her brother and father; Diabetes Type 2  in her brother; No Known Problems in her sister; Other Cancer in her sister.       Surgical Hx:   Past Surgical History:   Procedure Laterality Date    FINGER SURGERY Bilateral 01/01/2016    Dr. [facility]    FOOT TENDON SURGERY Right     HYSTERECTOMY  01/01/1980    IR CHEST PORT PLACEMENT > 5 YRS OF AGE  1/6/2025    IR MISCELLANEOUS PROCEDURE  3/17/2025    IR PICC PLACEMENT > 5 YRS OF AGE  3/11/2025    IR PORT REMOVAL LEFT  3/11/2025    LAPAROSCOPIC CHOLECYSTECTOMY  2008    LAPAROTOMY EXPLORATORY  1980    ovary/omentum abnormalities    OOPHORECTOMY Bilateral 01/01/1980          Allergies:    Allergies   Allergen Reactions     Cephalosporins Hives and Other (See Comments)     Diarrhea    Penicillins Hives and Rash    Celecoxib Headache and Other (See Comments)    Hydrocodone-Acetaminophen Headache    Hydrocodone-Acetaminophen Headache    Oxycodone Other (See Comments)    Hydrocodone Diarrhea    Nabumetone Diarrhea              Medication History:    Current Outpatient Medications   Medication Sig Dispense Refill    betamethasone dipropionate (DIPROLENE) 0.05 % ointment Apply topically as needed      capecitabine (XELODA) 500 MG tablet Take 1,000 mg by mouth 2 times daily.      colestipol (COLESTID) 1 g tablet Take 2 g by mouth.      CREON 69426-72990 units CPEP per EC capsule Take 3 capsules by mouth 3 times daily (with meals). 1 capsule with snacks      desonide (DESOWEN) 0.05 % cream Apply topically as needed      ELIQUIS ANTICOAGULANT 2.5 MG tablet Take 2.5 mg by mouth.      gabapentin (NEURONTIN) 100 MG capsule Take 2 capsules by mouth every 24 hours      Lactobacillus rhamnosus GG (CULTURELLE) 10-15 Billion cell capsule [LACTOBACILLUS RHAMNOSUS GG (CULTURELLE) 10-15 BILLION CELL CAPSULE] Take 1 capsule by mouth daily.      lidocaine-prilocaine (EMLA) 2.5-2.5 % external cream Apply 1 Application topically as needed for moderate pain.      Magnesium 400 MG TABS Take by mouth daily      methocarbamol (ROBAXIN) 750 MG tablet Take 500 mg by mouth daily as needed for muscle spasms.      OLANZapine (ZYPREXA) 5 MG tablet Take 5 mg by mouth at bedtime.      omeprazole (PRILOSEC) 40 MG DR capsule Take 40 mg by mouth daily.      ondansetron (ZOFRAN) 8 MG tablet Take 4 mg by mouth every 8 hours as needed for nausea.      prochlorperazine (COMPAZINE) 10 MG tablet Take 10 mg by mouth every 6 hours as needed for nausea.      psyllium with dextrose (METAMUCIL JAR) powder 1 teaspoon daily      valACYclovir (VALTREX) 500 MG tablet Take 500 mg by mouth as needed       No current facility-administered medications for this visit.         Tobacco  History:  reports that she quit smoking about 38 years ago. Her smoking use included cigarettes. She started smoking about 48 years ago. She has a 5 pack-year smoking history. She has never used smokeless tobacco.       REVIEW OF SYMPTOMS:   The review of systems was negative except as noted in the HPI.           PHYSICAL EXAMINATION:     BP (!) 146/73   Pulse 81   LMP 10/19/1980 (Approximate)   SpO2 99%            GENERAL: The patient overall appears well and is no acute distress.   HEAD: normocephalic   EYES: Sclera and conjunctiva clear   NECK: no obvious masses   LUNGS: breathing is unlabored.   EXTREMITIES: No clubbing, cyanosis, edema   SKIN: No rashes or other abnormalities except as noted under the Wound section below.   NEUROLOGICAL: normal motor and sensory function           Also see below for wound details:     Circumferential volume measures:             No data to display                  Impression:  @DXC@        Wound location: L) chest      3/19                                                                     3/26         4/10                                                                     post cleansing    Last photo: 4/10  Wound due to:  Port removal site  Wound history/plan of care: Port was removed on 3/11, eroded through skin.  Wound base: 100 %  pale non granular tissue , Full thickness,      Palpation of the wound bed: normal      Drainage: moderate     Description of drainage: serosanguinous     Measurements (length x width x depth, in cm): 0.2  x 0.7  x  0.2 cm      Tunneling: N/A     Undermining: healed  Periwound skin: Intact and had 2 sutures, removed      Color: normal and consistent with surrounding tissue      Temperature: normal   Odor: none  Pain: mild, aching  Pain interventions prior to dressing change: patient tolerated well, soaking, and slow and gentle cares   STATUS: healing      Ulceration(s)/Wound(s):   Please see the media tab under the chart review for pictures  "of the wounds.  Nursing staff removed dressings and cleansed wound.    VASC Wound Left chest (Active)   Description scab 04/10/25 1300          No results for input(s): \"HGBA1C\", \"A1C\", \"EAG\" in the last 48084 hours.    Invalid input(s): \"LZVIOFRUP9L\"       Recent Labs   Lab Test 06/27/24  0854 10/19/23  1420 07/19/23  0956   ALBUMIN 4.1 4.4 4.1         WBC Count   Date Value Ref Range Status   06/27/2024 9.1 4.0 - 11.0 10e3/uL Final     Albumin   Date Value Ref Range Status   06/27/2024 4.1 3.5 - 5.2 g/dL Final   05/26/2022 3.9 3.5 - 5.0 g/dL Final                      ASSESSMENT:   This is a 74 year old  female with L) chest open wound s/p port removal. Almost healed.            Karime Crooks, SHAKIRA, FNP-C, CWOCN  04/10/2025   1:37 PM   [facility]  [phone]           "

## 2025-04-10 NOTE — PROGRESS NOTES
"Wound care supplies were not ordered or needed today.        Wound Care Instructions    3 TIMES PER WEEK and as needed, Cleanse your L) chest wound(s) with vashe.    Pat Dry with non-sterile gauze    Primary Dressing: Pack with 2\"  Biatain fiber AG into/onto the wounds. Use only one piece and ensure to leave the tail outside    Secondary dressing: Cover with foam dressing    It is not ok to get your wound wet in the bath or shower      If for some reason you are not able to get your dressing(s) changed as outlined above (due to illness, lack of supplies, lack of help) please do the following: remove old, soiled dressings; wash the wounds with saline; pat dry; apply ABD pad or other absorbant pad and secure with rolled gauze; avoid tape directly on your skin; Call the clinic as soon as possible to let us know what the current issues are in receiving wound care [phone].      SEEK MEDICAL CARE IF:  You have an increase in swelling, pain, or redness around the wound.  You have an increase in the amount of pus coming from the wound.  There is a bad smell coming from the wound.  The wound appears to be worsening/enlarging  You have a fever greater than 101.5 F      It is ok to continue current wound care treatment/products for the next 2-3 days until new wound care supplies are ordered and arrive. If longer than this please contact our office at [phone].      Please NOTE: if you are 15 minutes late to your arrival time, you will have to be rescheduled. Please call our clinic as soon as possible if you know you will not be able to get to your appointment at [phone]. If you fail to show up to 3 scheduled clinic appointments you will be dismissed from our clinic.    High Protein Foods  Chicken  -Chicken breast, 3.5oz.-30 grams protein  -Chicken thigh-10 grams(average size)  -Drumstick-11 grams  -Wing- 6 grams  -Chicken meat, cooked, 4 oz.  Beef  -Hamburger yen, 4 oz-28 grams protein  -Steak, 6 oz-42 " grams  -Most cuts of beef- 7 grams of protein per ounce  Fish  -Most fish fillets or steaks are about 22 grams of protein for 3 1/2 oz(100 grams) of cooked fish, or 6 grams per ounce  -Tuna, 6 oz can-40 grams of protein  Pork  -Pork chop, average-22 grams protein  -Pork loin or tenderloin, 4 oz.-29 grams  -Ham, 3oz serving- 19 grams  -Ground pork 3oz cooked-22 grams  -Wolfe, 1 slice-3 grams  -[place]-style wolfe(black wolfe), slice-5-6 grams  Eggs and Dairy  -Egg, large-7 grams  -Milk, 1 cup-8 grams  -Cottage cheese, 1/2 cup-15 grams  -Greek yogurt, 1 cup-usually 8-12 grams, check label  -Soft cheeses (Mozzarella, Brie, Camembert)- 6 grams  -Medium cheeses(cheddar, swiss)- 7 or 8 grams per oz  -Hard cheeses(parmesan)- 10 grams per oz  Beans  -Tofu, 1/2 cup 20 grams  -Tofu, 1 oz., 2.3 grams  -Soy milk, 1 cup-6-10 grams  -Most beans(black, win, lentils, etc.) about 7-10 grams protein per half cup of cooked beans  -soy beans, 1/2 cup cooked-14 grams  -Split peas, 1/2 cup cooked- 8 grams  Nuts and Seeds  -Peanut butter, 2 Tablespoons- 8 grams protein  -Almonds, 1/4 cup- 8 grams  -Peanuts, 1/4 cup-9 grams  -Cashews, 1/4 cup- 5 grams  -Pecans, 1/4 cup- 2.5 grams  -Sunflower seeds, 1/4 cup- 6 grams  -Pumpkin seeds, 1/4 cup-8 grams  -Flax seeds- 1/4 cup- 8 grams  Protein Supplements  -Ensure  -Boost  -Glucerna, if diabetic  When you have an open ulcer, your bodies protein needs are much higher, so it is recommended eat good sources of protein      We want to hear from you!  In the next few weeks, you should receive a call or email to complete a survey about your visit at [facility]. Please help us improve your appointment experience by letting us know how we did today. We strive to make your experience good and value any ways in which we could do better.      We value your input and suggestions.    Thank you for choosing the [facility]!

## 2025-04-14 ENCOUNTER — TELEPHONE (OUTPATIENT)
Dept: VASCULAR SURGERY | Facility: CLINIC | Age: 75
End: 2025-04-14
Payer: COMMERCIAL

## 2025-04-14 NOTE — TELEPHONE ENCOUNTER
Caller: [facility]     Provider: Karime Crooks NP    Detailed reason for call: Asking for a call back with verbal orders to discharge skilled nursing and picc line changes at this time pre patients request.     Best phone number to contact: [phone]    Best time to contact: any    Ok to leave a detailed message: Yes

## 2025-04-14 NOTE — TELEPHONE ENCOUNTER
Call back to [facility]. Pt does still have weekly infusion clinic appointments so PICC will be cared for there. She is wanting to be discharged as she feels she can manage the wound herself at this time. Verbal orders given as requested.     June Caputo RN, CWOCN

## 2025-04-15 ENCOUNTER — OFFICE VISIT (OUTPATIENT)
Dept: VASCULAR SURGERY | Facility: CLINIC | Age: 75
End: 2025-04-15
Attending: HOSPITALIST
Payer: COMMERCIAL

## 2025-04-15 VITALS
BODY MASS INDEX: 18.88 KG/M2 | HEART RATE: 93 BPM | HEIGHT: 64 IN | RESPIRATION RATE: 18 BRPM | OXYGEN SATURATION: 100 % | TEMPERATURE: 98.1 F | SYSTOLIC BLOOD PRESSURE: 159 MMHG | DIASTOLIC BLOOD PRESSURE: 80 MMHG | WEIGHT: 110.6 LBS

## 2025-04-15 DIAGNOSIS — Z86.72 PERSONAL HISTORY OF THROMBOPHLEBITIS: ICD-10-CM

## 2025-04-15 DIAGNOSIS — Z86.718 HISTORY OF DVT (DEEP VEIN THROMBOSIS): Primary | ICD-10-CM

## 2025-04-15 PROCEDURE — G0463 HOSPITAL OUTPT CLINIC VISIT: HCPCS | Performed by: HOSPITALIST

## 2025-04-15 ASSESSMENT — PAIN SCALES - GENERAL: PAINLEVEL_OUTOF10: NO PAIN (0)

## 2025-04-15 NOTE — PROGRESS NOTES
"[facility]        Patient is here for a follow up  to discuss BLE swelling. The patient states he/she does  wear compressions. Swelling is observed in both lower extremities.    Pt is currently taking Eliquis.    BP (!) 159/80   Pulse 93   Temp 98.1  F (36.7  C)   Resp 18   Ht 5' 4\" (1.626 m)   Wt 110 lb 9.6 oz (50.2 kg)   LMP 10/19/1980 (Approximate)   SpO2 100%   BMI 18.98 kg/m      The provider has been notified that the patient has concerns, wants to discuss with provider.     Questions patient would like addressed today are: patient will discuss with provider.    Refills are needed: No    Has homecare services and agency name:  No           "

## 2025-04-15 NOTE — Clinical Note
Needs virtual visit follow up in mid/end of December 2025 with Dr. Blanc.  Follow up Hx RUE DVT, BLE lymphedema radiation induced

## 2025-04-15 NOTE — PATIENT INSTRUCTIONS
Moises Anthony,    Thank you for entrusting your care with us today. After your visit today with Dr. Liat Blanc this is the plan that was discussed at your appointment.    Get an ultrasound of the veins in your legs.  Dr. Blanc will comment on the results and you will be able to see that in your MyChart.     Continue to wear your compressions stockings daily.     Follow up with a virtual visit in December 2025.  A  will reach out to you closer to that time to schedule.         I am including additional information on these things and our contact information if you have any questions or concerns.   Please do not hesitate to reach out to us if you felt we did not answer your questions or you are unsure of the treatment plan after your visit today. Our number is [phone].Thank you for trusting us with your care.         Again thank you for your time.

## 2025-04-15 NOTE — PROGRESS NOTES
VASCULAR MEDICINE PROGRESS NOTE          LOCATION:  [facility]       Date of Service: 4/15/2025      Primary Care Provider: Lorenzo Dunn      Reason for the visit/chief complaint:   Follow up on superficial venous thrombosis  Previous history of RUE DVT      Subjective:  Raj Kim is a pleasant 74 year old female who presents to our Vascular Medicine clinic to follow up.    I first met Ms. Kim last year 6/12/2024.  I refer the reader to my initial consultation note for details.  Briefly, we met due to history of right upper extremity DVT that was diagnosed 3.5 years ago 12/22/2021.  At that time she had extensive right IJ, subclavian, axillary and brachial DVT with basilic SVT.  The only provoking factor was being on oral estrogen which was subsequently discontinued.  As previously mentioned she was seen by IR and hematology as well to determine anticoagulation plan.  She came to our clinic for second opinion and again we reviewed carefully her history and discussed risks and benefits of continuing versus discontinuing anticoagulation (see my previous documentation).  Ms. Kim desired to discontinue anticoagulation and we discussed frequent serial D-dimer testing.  Her initial D-dimer from 9/16/2024 came back low and we continued without anticoagulation and repeat another D-dimer in 6 months.    Other medical history includes acquired lymphedema bilateral lower extremity in a background of prior history of radiation for ovarian carcinoma when she was in her 30s around 1980 (previously seen by Dr. Spencer in 2015) that was stable.  This was stable.    Since we last talked, Ms. Kim was unfortunately diagnosed with adenocarcinoma of the pancreas.  This was diagnosed after EUS showed large ulcerated lesion at the level of the ampulla.  She did undergo pylorus-preserving Whipple's procedure 10/25/2024 at [facility].  Subsequently started chemotherapy 12/20/2024, planned for 6  "months.    Patient requested to see us today due to recent diagnosis of venous thrombophlebitis for which she was reinitiated on apixaban.  She reports having bilateral leg \" painful/uncomfortable knots\" and was told by her oncologist to start apixaban.  I did review her chart and on 3/4/2025 during follow-up with PCP, patient was noted to have clinical diagnosis of superficial thrombophlebitis of the left anterior lateral thigh.  There was no clinical suspicion for DVT and therefore venous duplex ultrasound was not pursued.  10 days after on 3/14/2025, she was seen by her local oncologist through [facility] and was noted to have bilateral thrombophlebitis of lower extremities and subsequently apixaban 2.5 mg twice daily was started.    She continues on chemotherapy now.  No new symptoms of superficial thrombophlebitis or lower extremity swelling, achiness after resuming apixaban.        Past medical history, surgical history, medications, family history, social history and allergies were reviewed. Pertinent points mentioned under HPI.        OBJECTIVE:    Vital signs:  LMP 10/19/1980 (Approximate)   Wt Readings from Last 1 Encounters:   03/04/25 108 lb (49 kg)     There is no height or weight on file to calculate BMI.      Physical exam:  General appearance: Pleasant female in no apparent distress.    HEENT: NC/AT.    Neck: No jugular venous distension.  No significant venous dilatation noted especially on the right side of the neck and upper extremity.  Heart: RRR. Normal S1, S2. No murmur, rub, click, or gallop.   Chest: Clear to auscultation bilaterally.  Extremities: No swelling noted to either for upper extremities.  No palpable or visible veins.  No tenderness or warmth.    Skin: Normal color and temperature.  Neurological: Alert, awake and oriented         DIAGNOSTIC STUDIES:   Labs and diagnostics reviewed including outside records. Pertinent points are mentioned under HPI and assessment and plan " sections.        ASSESSMENT AND PLAN:    Recent clinical diagnosis of bilateral cancer associated LE thrombophlebitis   Partially provoked?  Right upper extremity extensive DVT 12/22/2021  Lower extremity acquired lymphedema, stage 0: radiation-induced  Anticoagulation with apixaban 2.5 mg twice daily  Adenocarcinoma of the pancreas status post pylorus-preserving Whipple's procedure 10/25/2024 at [facility]    Ms. Kim did develop what initially felt to be partially provoked right upper extremity extensive DVT in December 2021.  While she was on oral estrogen at that time, we did question this being provoked only by estrogen treatment since she has been on it for closer to 30 years after her ovarian cancer diagnosis.  We discussed today that DVT can proceed a cancer diagnosis although typically within 6-12-month period.  It is interesting that her cancer was officially diagnosed September 2024 approximately 3 years after her DVT diagnosis but she likely had it prior to that.  Although this is a longer period of time, her extensive DVT could have theoretically been due to the cancer diagnosis.    Nonetheless, I do agree with continuing on apixaban right now especially with a clinical diagnosis of thrombophlebitis seems to be migratory in the setting of her pancreatic cancer diagnosis and she is already restarted by her oncologist 2.5 mg twice daily seems reasonable.  We discussed in the setting of cancer associated thromboembolism, we would typically continue anticoagulation throughout the cancer and treatment period and consider discontinuation if she is at some point declared cancer free and has been off any treatment for at least 6 months.  We agreed to make another appointment 6 months after she is expected to conclude her chemotherapy treatment in late December (chemotherapy is planned until late June).    As we previously discussed her lab D-dimer result from 4 weeks ago which resulted at 2.00 UG/mL will be  disregarded in this current setting of cancer treatment, frequent thrombophlebitis and the need for continuing anticoagulation.    She was somewhat concerned about if she does have any persistence of lower extremity clots.  We discussed that we could obtain venous duplex ultrasound of bilateral lower extremity not necessarily that it would change any management but for as baseline for future reference.        Recommendations:  Agree to continue apixaban 2.5 mg twice daily as long as there is active cancer and cancer treatment.  Follow-up in late December virtually to touch base on future anticoagulation plan.  Obtain venous duplex ultrasound of bilateral lower extremity as a baseline for future reference.  Will comment on the results once available.  Do not anticipate this to change any clinical decision about anticoagulation.  Continue to wear lower extremity compression stockings as able.    Pleasure again meeting Ms. Kim in our clinic.    Liat Blanc MD, [facility]  Vascular Medicine  April 15, 2025    The longitudinal plan of care for the diagnosis(es)/condition(s) as documented were addressed during this visit. Due to the added complexity in care, I will continue to support Raj in the subsequent management and with ongoing continuity of care.    40 minutes spent on the date of the encounter doing chart review including outside records, history and exam, documentation, coordinating care, and further activities as noted with over half of the time spent in education and counseling.

## 2025-04-21 ENCOUNTER — TELEPHONE (OUTPATIENT)
Dept: VASCULAR SURGERY | Facility: CLINIC | Age: 75
End: 2025-04-21
Payer: COMMERCIAL

## 2025-04-21 NOTE — TELEPHONE ENCOUNTER
Raj is concerned for the cost of the bill when it comes to the ultrasound that is ordered by Dr. Blanc. She is going to call [facility] and see if they can get it done in the timeframe she is wanting and get it done for cheaper.  She will call us back to confirm where she would like us to send the orders for the ultrasound. Did not cancel the current appointment with us yet, but cancel it when the patient calls back after confirming she is going to do it elsewhere.

## 2025-04-21 NOTE — TELEPHONE ENCOUNTER
Patient would like the orders for the ultrasound to be faxed to the [facility] [place]. I sent a message to staff working on site to fax it over to: [phone] and canceled the ultrasound at our location.

## 2025-04-25 ENCOUNTER — TRANSFERRED RECORDS (OUTPATIENT)
Dept: HEALTH INFORMATION MANAGEMENT | Facility: CLINIC | Age: 75
End: 2025-04-25
Payer: COMMERCIAL

## 2025-05-16 ENCOUNTER — TRANSFERRED RECORDS (OUTPATIENT)
Dept: HEALTH INFORMATION MANAGEMENT | Facility: CLINIC | Age: 75
End: 2025-05-16
Payer: COMMERCIAL

## 2025-06-06 ENCOUNTER — TRANSFERRED RECORDS (OUTPATIENT)
Dept: HEALTH INFORMATION MANAGEMENT | Facility: CLINIC | Age: 75
End: 2025-06-06
Payer: COMMERCIAL

## 2025-06-10 ENCOUNTER — MEDICAL CORRESPONDENCE (OUTPATIENT)
Dept: HEALTH INFORMATION MANAGEMENT | Facility: CLINIC | Age: 75
End: 2025-06-10
Payer: COMMERCIAL

## 2025-06-12 ENCOUNTER — MEDICAL CORRESPONDENCE (OUTPATIENT)
Dept: HEALTH INFORMATION MANAGEMENT | Facility: CLINIC | Age: 75
End: 2025-06-12
Payer: COMMERCIAL

## 2025-06-23 ENCOUNTER — PATIENT OUTREACH (OUTPATIENT)
Dept: CARE COORDINATION | Facility: CLINIC | Age: 75
End: 2025-06-23

## 2025-06-23 ENCOUNTER — HOSPITAL ENCOUNTER (OUTPATIENT)
Dept: INTERVENTIONAL RADIOLOGY/VASCULAR | Facility: HOSPITAL | Age: 75
Discharge: HOME | End: 2025-06-23
Attending: INTERNAL MEDICINE
Payer: COMMERCIAL

## 2025-06-23 VITALS
HEART RATE: 82 BPM | RESPIRATION RATE: 16 BRPM | DIASTOLIC BLOOD PRESSURE: 72 MMHG | OXYGEN SATURATION: 100 % | SYSTOLIC BLOOD PRESSURE: 139 MMHG

## 2025-06-23 DIAGNOSIS — C25.0 PRIMARY CANCER OF HEAD OF PANCREAS (H): ICD-10-CM

## 2025-06-23 NOTE — DISCHARGE INSTRUCTIONS
Leave bandage on today, you may remove tomorrow.    You may shower tomorrow. Do not soak in a bath tub, hot tub, or swim until the site is completely healed. Keep the site clean and dry.    ADDITIONAL INSTRUCTIONS    When to call your Doctor:     1. Watch your site for signs of infection, increase pain, redness, swelling, or any drainage and or fever or chills.    2. Sudden on-set of shortness of breath - call 911 or go to the emergency room.

## 2025-06-23 NOTE — PROCEDURES
Pt came to us with PICC line in place and order for removal. PICC removed using sterile technique without difficulty.  Total catheter length at time of removal was 45cm. Occlusive dressing applied to site. Pt denies discomfort or shortness of breath post removal.  Pt aware to keep dressing on for 24 hours.

## 2025-06-27 ENCOUNTER — TELEPHONE (OUTPATIENT)
Dept: INTERNAL MEDICINE | Facility: CLINIC | Age: 75
End: 2025-06-27
Payer: COMMERCIAL

## 2025-07-21 ENCOUNTER — ANCILLARY PROCEDURE (OUTPATIENT)
Dept: MAMMOGRAPHY | Facility: CLINIC | Age: 75
End: 2025-07-21
Payer: COMMERCIAL

## 2025-07-21 DIAGNOSIS — Z12.31 VISIT FOR SCREENING MAMMOGRAM: ICD-10-CM

## 2025-07-21 PROCEDURE — 77067 SCR MAMMO BI INCL CAD: CPT | Mod: TC | Performed by: RADIOLOGY

## 2025-07-21 PROCEDURE — 77063 BREAST TOMOSYNTHESIS BI: CPT | Mod: TC | Performed by: RADIOLOGY

## 2025-07-31 SDOH — HEALTH STABILITY: PHYSICAL HEALTH: ON AVERAGE, HOW MANY MINUTES DO YOU ENGAGE IN EXERCISE AT THIS LEVEL?: 0 MIN

## 2025-07-31 SDOH — HEALTH STABILITY: PHYSICAL HEALTH: ON AVERAGE, HOW MANY DAYS PER WEEK DO YOU ENGAGE IN MODERATE TO STRENUOUS EXERCISE (LIKE A BRISK WALK)?: 0 DAYS

## 2025-07-31 ASSESSMENT — SOCIAL DETERMINANTS OF HEALTH (SDOH): HOW OFTEN DO YOU GET TOGETHER WITH FRIENDS OR RELATIVES?: ONCE A WEEK

## 2025-08-05 ENCOUNTER — OFFICE VISIT (OUTPATIENT)
Dept: INTERNAL MEDICINE | Facility: CLINIC | Age: 75
End: 2025-08-05
Payer: COMMERCIAL

## 2025-08-05 VITALS
BODY MASS INDEX: 17.91 KG/M2 | WEIGHT: 104.9 LBS | HEIGHT: 64 IN | RESPIRATION RATE: 14 BRPM | OXYGEN SATURATION: 97 % | SYSTOLIC BLOOD PRESSURE: 112 MMHG | HEART RATE: 75 BPM | TEMPERATURE: 97.1 F | DIASTOLIC BLOOD PRESSURE: 62 MMHG

## 2025-08-05 DIAGNOSIS — Z90.49 HISTORY OF WHIPPLE PROCEDURE: ICD-10-CM

## 2025-08-05 DIAGNOSIS — Z98.0 INTESTINAL BYPASS AND ANASTOMOSIS STATUS: ICD-10-CM

## 2025-08-05 DIAGNOSIS — C24.1 PRIMARY ADENOCARCINOMA OF AMPULLA OF VATER (H): ICD-10-CM

## 2025-08-05 DIAGNOSIS — N13.1 HYDRONEPHROSIS CONCURRENT WITH AND DUE TO URETERAL STRICTURE: ICD-10-CM

## 2025-08-05 DIAGNOSIS — Z23 NEED FOR VACCINATION: ICD-10-CM

## 2025-08-05 DIAGNOSIS — M81.0 AGE-RELATED OSTEOPOROSIS WITHOUT CURRENT PATHOLOGICAL FRACTURE: Primary | ICD-10-CM

## 2025-08-05 DIAGNOSIS — Z86.718 HISTORY OF DEEP VENOUS THROMBOSIS: ICD-10-CM

## 2025-08-05 DIAGNOSIS — Z90.410 HISTORY OF WHIPPLE PROCEDURE: ICD-10-CM

## 2025-08-05 DIAGNOSIS — Z85.43 PERSONAL HISTORY OF OVARIAN CANCER: ICD-10-CM

## 2025-08-05 DIAGNOSIS — Z86.0100 HISTORY OF COLONIC POLYPS: ICD-10-CM

## 2025-08-05 DIAGNOSIS — R63.4 WEIGHT LOSS: ICD-10-CM

## 2025-08-05 DIAGNOSIS — D64.9 CHRONIC ANEMIA: ICD-10-CM

## 2025-08-05 DIAGNOSIS — E44.0 MODERATE PROTEIN-CALORIE MALNUTRITION: ICD-10-CM

## 2025-08-05 PROBLEM — T14.8XXA OPEN WOUND: Status: RESOLVED | Noted: 2025-04-10 | Resolved: 2025-08-05

## 2025-08-05 PROBLEM — E46 PROTEIN-CALORIE MALNUTRITION: Status: ACTIVE | Noted: 2025-08-05

## 2025-08-05 PROBLEM — T80.219D: Status: RESOLVED | Noted: 2025-04-10 | Resolved: 2025-08-05

## 2025-08-05 RX ORDER — COVID-19 VACCINE, MRNA 0.04 MG/.418ML
INJECTION, SUSPENSION INTRAMUSCULAR
COMMUNITY
Start: 2024-10-02

## 2025-08-05 RX ORDER — TAMSULOSIN HYDROCHLORIDE 0.4 MG/1
CAPSULE ORAL
COMMUNITY
Start: 2024-11-01

## 2025-08-05 RX ORDER — HYDROCORTISONE 25 MG/G
OINTMENT TOPICAL
COMMUNITY
Start: 2025-03-07

## 2025-08-05 RX ORDER — INFLUENZA A VIRUS A/VICTORIA/4897/2022 IVR-238 (H1N1) ANTIGEN (FORMALDEHYDE INACTIVATED), INFLUENZA A VIRUS A/CALIFORNIA/122/2022 SAN-022 (H3N2) ANTIGEN (FORMALDEHYDE INACTIVATED), AND INFLUENZA B VIRUS B/MICHIGAN/01/2021 ANTIGEN (FORMALDEHYDE INACTIVATED) 60; 60; 60 UG/.5ML; UG/.5ML; UG/.5ML
INJECTION, SUSPENSION INTRAMUSCULAR
COMMUNITY
Start: 2024-09-29

## 2025-08-05 RX ORDER — HYDROMORPHONE HYDROCHLORIDE 2 MG/1
TABLET ORAL
COMMUNITY
Start: 2024-10-31

## 2025-08-05 RX ORDER — OMEPRAZOLE 20 MG/1
CAPSULE, DELAYED RELEASE ORAL
COMMUNITY
Start: 2024-12-11

## 2025-08-05 RX ORDER — PANCRELIPASE 36000; 180000; 114000 [USP'U]/1; [USP'U]/1; [USP'U]/1
CAPSULE, DELAYED RELEASE PELLETS ORAL
COMMUNITY
Start: 2025-07-01

## 2025-08-05 RX ORDER — TRAMADOL HYDROCHLORIDE 50 MG/1
TABLET ORAL
COMMUNITY
Start: 2024-10-18

## 2025-08-05 RX ORDER — PANCRELIPASE LIPASE, PANCRELIPASE PROTEASE, PANCRELIPASE AMYLASE 40000; 126000; 168000 [USP'U]/1; [USP'U]/1; [USP'U]/1
CAPSULE, DELAYED RELEASE ORAL
COMMUNITY
Start: 2025-04-01

## 2025-08-05 RX ORDER — ENOXAPARIN SODIUM 100 MG/ML
INJECTION SUBCUTANEOUS
COMMUNITY
Start: 2024-10-31

## 2025-08-05 RX ORDER — METOCLOPRAMIDE 5 MG/1
TABLET ORAL
COMMUNITY
Start: 2024-10-31

## 2025-08-05 RX ORDER — ONDANSETRON 4 MG/1
TABLET, ORALLY DISINTEGRATING ORAL
COMMUNITY
Start: 2024-10-31

## 2025-08-12 DIAGNOSIS — M81.0 AGE-RELATED OSTEOPOROSIS WITHOUT CURRENT PATHOLOGICAL FRACTURE: Primary | ICD-10-CM

## 2025-08-14 ENCOUNTER — TELEPHONE (OUTPATIENT)
Dept: INTERNAL MEDICINE | Facility: CLINIC | Age: 75
End: 2025-08-14
Payer: COMMERCIAL

## 2025-08-14 DIAGNOSIS — M81.0 AGE-RELATED OSTEOPOROSIS WITHOUT CURRENT PATHOLOGICAL FRACTURE: Primary | ICD-10-CM

## 2025-08-14 DIAGNOSIS — Z92.29 PERSONAL HISTORY OF OTHER DRUG THERAPY: ICD-10-CM

## 2025-08-15 ENCOUNTER — TRANSFERRED RECORDS (OUTPATIENT)
Dept: HEALTH INFORMATION MANAGEMENT | Facility: CLINIC | Age: 75
End: 2025-08-15

## 2025-08-15 ENCOUNTER — ANCILLARY PROCEDURE (OUTPATIENT)
Dept: BONE DENSITY | Facility: CLINIC | Age: 75
End: 2025-08-15
Attending: INTERNAL MEDICINE
Payer: COMMERCIAL

## 2025-08-15 DIAGNOSIS — M81.0 AGE-RELATED OSTEOPOROSIS WITHOUT CURRENT PATHOLOGICAL FRACTURE: ICD-10-CM

## 2025-08-15 PROCEDURE — 77091 TBS TECHL CALCULATION ONLY: CPT | Performed by: NURSE PRACTITIONER

## 2025-08-15 PROCEDURE — 77080 DXA BONE DENSITY AXIAL: CPT | Mod: TC | Performed by: NURSE PRACTITIONER

## 2025-08-18 ENCOUNTER — RESULTS FOLLOW-UP (OUTPATIENT)
Dept: INTERNAL MEDICINE | Facility: CLINIC | Age: 75
End: 2025-08-18
Payer: COMMERCIAL

## (undated) RX ORDER — LIDOCAINE HYDROCHLORIDE 10 MG/ML
INJECTION, SOLUTION INFILTRATION; PERINEURAL
Status: DISPENSED
Start: 2025-03-11

## (undated) RX ORDER — FENTANYL CITRATE 50 UG/ML
INJECTION, SOLUTION INTRAMUSCULAR; INTRAVENOUS
Status: DISPENSED
Start: 2025-01-06

## (undated) RX ORDER — FENTANYL CITRATE 50 UG/ML
INJECTION, SOLUTION INTRAMUSCULAR; INTRAVENOUS
Status: DISPENSED
Start: 2025-03-11